# Patient Record
Sex: MALE | Race: WHITE | Employment: OTHER | URBAN - NONMETROPOLITAN AREA
[De-identification: names, ages, dates, MRNs, and addresses within clinical notes are randomized per-mention and may not be internally consistent; named-entity substitution may affect disease eponyms.]

---

## 2017-03-31 ENCOUNTER — OFFICE VISIT (OUTPATIENT)
Dept: FAMILY MEDICINE CLINIC | Age: 62
End: 2017-03-31
Payer: COMMERCIAL

## 2017-03-31 VITALS
WEIGHT: 184 LBS | DIASTOLIC BLOOD PRESSURE: 70 MMHG | HEART RATE: 64 BPM | HEIGHT: 71 IN | RESPIRATION RATE: 16 BRPM | BODY MASS INDEX: 25.76 KG/M2 | SYSTOLIC BLOOD PRESSURE: 130 MMHG

## 2017-03-31 DIAGNOSIS — M89.8X5 PAIN IN RIGHT FEMUR: Primary | ICD-10-CM

## 2017-03-31 DIAGNOSIS — S72.044A CLOSED NONDISPLACED BASICERVICAL FRACTURE OF RIGHT FEMUR, INITIAL ENCOUNTER (HCC): ICD-10-CM

## 2017-03-31 DIAGNOSIS — E11.59 TYPE 2 DIABETES MELLITUS WITH OTHER CIRCULATORY COMPLICATION: ICD-10-CM

## 2017-03-31 LAB — HBA1C MFR BLD: 5.6 %

## 2017-03-31 PROCEDURE — 83036 HEMOGLOBIN GLYCOSYLATED A1C: CPT | Performed by: FAMILY MEDICINE

## 2017-03-31 PROCEDURE — 99214 OFFICE O/P EST MOD 30 MIN: CPT | Performed by: FAMILY MEDICINE

## 2017-03-31 RX ORDER — LINAGLIPTIN 5 MG/1
TABLET, FILM COATED ORAL
Refills: 0 | COMMUNITY
Start: 2017-03-03 | End: 2017-04-07 | Stop reason: SDUPTHER

## 2017-03-31 RX ORDER — ASPIRIN 81 MG
TABLET,CHEWABLE ORAL
Refills: 0 | COMMUNITY
Start: 2017-03-03 | End: 2017-03-31 | Stop reason: ALTCHOICE

## 2017-03-31 RX ORDER — GABAPENTIN 400 MG/1
400 CAPSULE ORAL 4 TIMES DAILY
COMMUNITY
End: 2017-08-08 | Stop reason: SDUPTHER

## 2017-03-31 RX ORDER — PIOGLITAZONEHYDROCHLORIDE 15 MG/1
TABLET ORAL
Refills: 0 | COMMUNITY
Start: 2017-03-03 | End: 2017-03-31 | Stop reason: SDUPTHER

## 2017-03-31 RX ORDER — OXYCODONE HYDROCHLORIDE AND ACETAMINOPHEN 5; 325 MG/1; MG/1
TABLET ORAL
Refills: 0 | COMMUNITY
Start: 2017-03-03 | End: 2018-03-05 | Stop reason: ALTCHOICE

## 2017-03-31 RX ORDER — FERROUS SULFATE 325(65) MG
TABLET ORAL
Refills: 0 | COMMUNITY
Start: 2017-03-03 | End: 2017-03-31 | Stop reason: SDUPTHER

## 2017-03-31 ASSESSMENT — ENCOUNTER SYMPTOMS
COUGH: 0
CHOKING: 0
PHOTOPHOBIA: 0
FACIAL SWELLING: 0
CONSTIPATION: 0
EYE DISCHARGE: 0
ALLERGIC/IMMUNOLOGIC NEGATIVE: 1
EYES NEGATIVE: 1
WHEEZING: 0
VOICE CHANGE: 0
ABDOMINAL DISTENTION: 0
SINUS PRESSURE: 0
RECTAL PAIN: 0
EYE PAIN: 0
COLOR CHANGE: 0
DIARRHEA: 0
GASTROINTESTINAL NEGATIVE: 1
SHORTNESS OF BREATH: 0
ANAL BLEEDING: 0
BACK PAIN: 0
VOMITING: 0
TROUBLE SWALLOWING: 0
NAUSEA: 0
RHINORRHEA: 0
STRIDOR: 0
RESPIRATORY NEGATIVE: 1
CHEST TIGHTNESS: 0
BLOOD IN STOOL: 0
EYE REDNESS: 0
APNEA: 0
ABDOMINAL PAIN: 0
SORE THROAT: 0
EYE ITCHING: 0

## 2017-04-05 RX ORDER — FERROUS SULFATE 325(65) MG
TABLET ORAL
Qty: 60 TABLET | Refills: 0 | Status: SHIPPED | OUTPATIENT
Start: 2017-04-05 | End: 2017-04-25 | Stop reason: SDUPTHER

## 2017-04-07 DIAGNOSIS — E78.2 MIXED HYPERLIPIDEMIA: ICD-10-CM

## 2017-04-07 DIAGNOSIS — E11.59 TYPE 2 DIABETES MELLITUS WITH OTHER CIRCULATORY COMPLICATIONS (HCC): ICD-10-CM

## 2017-04-07 RX ORDER — PSEUDOEPHEDRINE HCL 30 MG
100 TABLET ORAL 2 TIMES DAILY
Qty: 60 CAPSULE | Refills: 1 | Status: SHIPPED | OUTPATIENT
Start: 2017-04-07 | End: 2017-11-10 | Stop reason: SDUPTHER

## 2017-04-07 RX ORDER — AMLODIPINE BESYLATE 10 MG/1
TABLET ORAL
Qty: 30 TABLET | Refills: 5 | Status: SHIPPED | OUTPATIENT
Start: 2017-04-07 | End: 2017-11-10 | Stop reason: SDUPTHER

## 2017-04-07 RX ORDER — GLIPIZIDE 10 MG/1
TABLET, FILM COATED, EXTENDED RELEASE ORAL
Qty: 30 TABLET | Refills: 5 | Status: SHIPPED | OUTPATIENT
Start: 2017-04-07 | End: 2017-11-10 | Stop reason: SDUPTHER

## 2017-04-07 RX ORDER — LINAGLIPTIN 5 MG/1
TABLET, FILM COATED ORAL
Qty: 30 TABLET | Refills: 5 | Status: SHIPPED | OUTPATIENT
Start: 2017-04-07 | End: 2017-11-10 | Stop reason: SDUPTHER

## 2017-04-07 RX ORDER — LOVASTATIN 20 MG/1
TABLET ORAL
Qty: 30 TABLET | Refills: 5 | Status: SHIPPED | OUTPATIENT
Start: 2017-04-07 | End: 2017-11-10 | Stop reason: SDUPTHER

## 2017-04-26 RX ORDER — FERROUS SULFATE 325(65) MG
TABLET ORAL
Qty: 60 TABLET | Refills: 0 | Status: SHIPPED | OUTPATIENT
Start: 2017-04-26 | End: 2017-05-12 | Stop reason: SDUPTHER

## 2017-05-09 DIAGNOSIS — Z89.421 STATUS POST AMPUTATION OF TOE OF RIGHT FOOT (HCC): ICD-10-CM

## 2017-05-09 RX ORDER — CLOPIDOGREL BISULFATE 75 MG/1
TABLET ORAL
Qty: 30 TABLET | Refills: 0 | Status: SHIPPED | OUTPATIENT
Start: 2017-05-09 | End: 2017-06-16 | Stop reason: SDUPTHER

## 2017-05-15 RX ORDER — FERROUS SULFATE 325(65) MG
TABLET ORAL
Qty: 60 TABLET | Refills: 0 | Status: SHIPPED | OUTPATIENT
Start: 2017-05-15 | End: 2017-06-30 | Stop reason: SDUPTHER

## 2017-05-25 DIAGNOSIS — F32.5 DEPRESSION, MAJOR, IN REMISSION (HCC): ICD-10-CM

## 2017-05-25 RX ORDER — SERTRALINE HYDROCHLORIDE 25 MG/1
TABLET, FILM COATED ORAL
Qty: 90 TABLET | Refills: 1 | Status: SHIPPED | OUTPATIENT
Start: 2017-05-25 | End: 2017-11-10 | Stop reason: SDUPTHER

## 2017-06-16 DIAGNOSIS — E11.59 TYPE 2 DIABETES MELLITUS WITH OTHER CIRCULATORY COMPLICATIONS (HCC): ICD-10-CM

## 2017-06-16 DIAGNOSIS — Z89.421 STATUS POST AMPUTATION OF TOE OF RIGHT FOOT (HCC): ICD-10-CM

## 2017-06-16 RX ORDER — CLOPIDOGREL BISULFATE 75 MG/1
TABLET ORAL
Qty: 30 TABLET | Refills: 5 | Status: SHIPPED | OUTPATIENT
Start: 2017-06-16 | End: 2017-11-10 | Stop reason: SDUPTHER

## 2017-06-30 ENCOUNTER — OFFICE VISIT (OUTPATIENT)
Dept: FAMILY MEDICINE CLINIC | Age: 62
End: 2017-06-30
Payer: COMMERCIAL

## 2017-06-30 VITALS
DIASTOLIC BLOOD PRESSURE: 70 MMHG | BODY MASS INDEX: 28.56 KG/M2 | HEART RATE: 72 BPM | HEIGHT: 71 IN | RESPIRATION RATE: 18 BRPM | WEIGHT: 204 LBS | SYSTOLIC BLOOD PRESSURE: 130 MMHG

## 2017-06-30 DIAGNOSIS — E11.59 TYPE 2 DIABETES MELLITUS WITH OTHER CIRCULATORY COMPLICATION: Primary | ICD-10-CM

## 2017-06-30 DIAGNOSIS — L50.8 URTICARIA, CHRONIC: ICD-10-CM

## 2017-06-30 DIAGNOSIS — M19.042 ARTHRITIS OF BOTH HANDS: ICD-10-CM

## 2017-06-30 DIAGNOSIS — I10 ESSENTIAL HYPERTENSION: ICD-10-CM

## 2017-06-30 DIAGNOSIS — M19.041 ARTHRITIS OF BOTH HANDS: ICD-10-CM

## 2017-06-30 DIAGNOSIS — E78.2 MIXED HYPERLIPIDEMIA: ICD-10-CM

## 2017-06-30 PROCEDURE — 99213 OFFICE O/P EST LOW 20 MIN: CPT | Performed by: FAMILY MEDICINE

## 2017-06-30 RX ORDER — CETIRIZINE HYDROCHLORIDE 10 MG/1
10 TABLET ORAL DAILY
Qty: 30 TABLET | Refills: 5 | Status: SHIPPED | OUTPATIENT
Start: 2017-06-30 | End: 2017-11-10 | Stop reason: SDUPTHER

## 2017-06-30 RX ORDER — MELOXICAM 15 MG/1
15 TABLET ORAL DAILY
Qty: 30 TABLET | Refills: 3 | Status: SHIPPED | OUTPATIENT
Start: 2017-06-30 | End: 2017-11-10 | Stop reason: SDUPTHER

## 2017-06-30 ASSESSMENT — ENCOUNTER SYMPTOMS
ALLERGIC/IMMUNOLOGIC NEGATIVE: 1
ANAL BLEEDING: 0
RHINORRHEA: 0
EYE ITCHING: 0
RESPIRATORY NEGATIVE: 1
EYE DISCHARGE: 0
PHOTOPHOBIA: 0
BACK PAIN: 0
RECTAL PAIN: 0
ABDOMINAL PAIN: 0
CHOKING: 0
EYES NEGATIVE: 1
SINUS PRESSURE: 0
STRIDOR: 0
SORE THROAT: 0
EYE REDNESS: 0
NAUSEA: 0
CHEST TIGHTNESS: 0
EYE PAIN: 0
TROUBLE SWALLOWING: 0
ABDOMINAL DISTENTION: 0
BLOOD IN STOOL: 0
SHORTNESS OF BREATH: 0
APNEA: 0
WHEEZING: 0
VOMITING: 0
FACIAL SWELLING: 0
CONSTIPATION: 0
COLOR CHANGE: 0
DIARRHEA: 0
VOICE CHANGE: 0
GASTROINTESTINAL NEGATIVE: 1
COUGH: 0

## 2017-07-19 DIAGNOSIS — S72.044A CLOSED NONDISPLACED BASICERVICAL FRACTURE OF RIGHT FEMUR, INITIAL ENCOUNTER (HCC): ICD-10-CM

## 2017-07-19 DIAGNOSIS — M89.8X5 PAIN IN RIGHT FEMUR: ICD-10-CM

## 2017-08-07 ENCOUNTER — TELEPHONE (OUTPATIENT)
Dept: FAMILY MEDICINE CLINIC | Age: 62
End: 2017-08-07

## 2017-08-07 DIAGNOSIS — I10 ESSENTIAL HYPERTENSION: Primary | ICD-10-CM

## 2017-08-07 DIAGNOSIS — E11.42 DIABETIC PERIPHERAL NEUROPATHY (HCC): ICD-10-CM

## 2017-08-07 DIAGNOSIS — D50.9 IRON DEFICIENCY ANEMIA, UNSPECIFIED IRON DEFICIENCY ANEMIA TYPE: ICD-10-CM

## 2017-08-08 PROBLEM — E11.42 DIABETIC PERIPHERAL NEUROPATHY (HCC): Status: ACTIVE | Noted: 2017-08-08

## 2017-08-08 RX ORDER — CLONIDINE HYDROCHLORIDE 0.1 MG/1
0.1 TABLET ORAL DAILY
Qty: 90 TABLET | Refills: 3 | Status: SHIPPED | OUTPATIENT
Start: 2017-08-08 | End: 2018-07-20 | Stop reason: SDUPTHER

## 2017-08-08 RX ORDER — GABAPENTIN 400 MG/1
400 CAPSULE ORAL 4 TIMES DAILY
Qty: 120 CAPSULE | Refills: 3 | Status: SHIPPED | OUTPATIENT
Start: 2017-08-08 | End: 2017-11-10 | Stop reason: SDUPTHER

## 2017-08-08 RX ORDER — LANOLIN ALCOHOL/MO/W.PET/CERES
325 CREAM (GRAM) TOPICAL 2 TIMES DAILY
Qty: 180 TABLET | Refills: 3 | Status: SHIPPED | OUTPATIENT
Start: 2017-08-08 | End: 2017-11-10

## 2017-08-30 DIAGNOSIS — M89.8X5 PAIN IN RIGHT FEMUR: ICD-10-CM

## 2017-08-30 DIAGNOSIS — S72.044A CLOSED NONDISPLACED BASICERVICAL FRACTURE OF RIGHT FEMUR, INITIAL ENCOUNTER (HCC): ICD-10-CM

## 2017-11-10 ENCOUNTER — OFFICE VISIT (OUTPATIENT)
Dept: FAMILY MEDICINE CLINIC | Age: 62
End: 2017-11-10
Payer: COMMERCIAL

## 2017-11-10 VITALS
DIASTOLIC BLOOD PRESSURE: 80 MMHG | HEIGHT: 71 IN | SYSTOLIC BLOOD PRESSURE: 140 MMHG | RESPIRATION RATE: 18 BRPM | HEART RATE: 72 BPM | WEIGHT: 208 LBS | BODY MASS INDEX: 29.12 KG/M2

## 2017-11-10 DIAGNOSIS — E78.2 MIXED HYPERLIPIDEMIA: ICD-10-CM

## 2017-11-10 DIAGNOSIS — M19.041 ARTHRITIS OF BOTH HANDS: ICD-10-CM

## 2017-11-10 DIAGNOSIS — E11.59 TYPE 2 DIABETES MELLITUS WITH OTHER CIRCULATORY COMPLICATION, UNSPECIFIED LONG TERM INSULIN USE STATUS: Primary | ICD-10-CM

## 2017-11-10 DIAGNOSIS — M19.042 ARTHRITIS OF BOTH HANDS: ICD-10-CM

## 2017-11-10 DIAGNOSIS — I10 ESSENTIAL HYPERTENSION: ICD-10-CM

## 2017-11-10 LAB — HBA1C MFR BLD: 8.7 %

## 2017-11-10 PROCEDURE — 3017F COLORECTAL CA SCREEN DOC REV: CPT | Performed by: INTERNAL MEDICINE

## 2017-11-10 PROCEDURE — 99214 OFFICE O/P EST MOD 30 MIN: CPT | Performed by: INTERNAL MEDICINE

## 2017-11-10 PROCEDURE — G8417 CALC BMI ABV UP PARAM F/U: HCPCS | Performed by: INTERNAL MEDICINE

## 2017-11-10 PROCEDURE — 3044F HG A1C LEVEL LT 7.0%: CPT | Performed by: INTERNAL MEDICINE

## 2017-11-10 PROCEDURE — G8484 FLU IMMUNIZE NO ADMIN: HCPCS | Performed by: INTERNAL MEDICINE

## 2017-11-10 PROCEDURE — 83036 HEMOGLOBIN GLYCOSYLATED A1C: CPT | Performed by: INTERNAL MEDICINE

## 2017-11-10 PROCEDURE — 1036F TOBACCO NON-USER: CPT | Performed by: INTERNAL MEDICINE

## 2017-11-10 PROCEDURE — G8427 DOCREV CUR MEDS BY ELIG CLIN: HCPCS | Performed by: INTERNAL MEDICINE

## 2017-11-10 RX ORDER — FERROUS SULFATE 325(65) MG
1 TABLET ORAL 2 TIMES DAILY
Refills: 3 | COMMUNITY
Start: 2017-11-01 | End: 2018-07-20 | Stop reason: SDUPTHER

## 2017-11-10 RX ORDER — AMLODIPINE BESYLATE 10 MG/1
TABLET ORAL
Qty: 30 TABLET | Refills: 5 | Status: SHIPPED | OUTPATIENT
Start: 2017-11-10 | End: 2018-07-20 | Stop reason: SDUPTHER

## 2017-11-10 RX ORDER — LISINOPRIL 2.5 MG/1
2.5 TABLET ORAL DAILY
Qty: 30 TABLET | Refills: 3 | Status: SHIPPED | OUTPATIENT
Start: 2017-11-10 | End: 2018-03-05 | Stop reason: SDUPTHER

## 2017-11-10 RX ORDER — GLIPIZIDE 10 MG/1
TABLET, FILM COATED, EXTENDED RELEASE ORAL
Qty: 30 TABLET | Refills: 5 | Status: SHIPPED | OUTPATIENT
Start: 2017-11-10 | End: 2018-07-20 | Stop reason: SDUPTHER

## 2017-11-10 RX ORDER — CETIRIZINE HYDROCHLORIDE 10 MG/1
10 TABLET ORAL DAILY
Qty: 30 TABLET | Refills: 5 | Status: SHIPPED | OUTPATIENT
Start: 2017-11-10 | End: 2018-07-20 | Stop reason: SDUPTHER

## 2017-11-10 RX ORDER — LINAGLIPTIN 5 MG/1
TABLET, FILM COATED ORAL
Qty: 30 TABLET | Refills: 5 | Status: SHIPPED | OUTPATIENT
Start: 2017-11-10 | End: 2017-11-13

## 2017-11-10 RX ORDER — CLOPIDOGREL BISULFATE 75 MG/1
TABLET ORAL
Qty: 30 TABLET | Refills: 5 | Status: SHIPPED | OUTPATIENT
Start: 2017-11-10 | End: 2018-07-20 | Stop reason: SDUPTHER

## 2017-11-10 RX ORDER — MELOXICAM 15 MG/1
15 TABLET ORAL DAILY
Qty: 30 TABLET | Refills: 5 | Status: SHIPPED | OUTPATIENT
Start: 2017-11-10 | End: 2018-07-20 | Stop reason: SDUPTHER

## 2017-11-10 RX ORDER — PSEUDOEPHEDRINE HCL 30 MG
100 TABLET ORAL 2 TIMES DAILY
Qty: 60 CAPSULE | Refills: 1 | Status: SHIPPED | OUTPATIENT
Start: 2017-11-10 | End: 2018-03-05 | Stop reason: SDUPTHER

## 2017-11-10 RX ORDER — LOVASTATIN 20 MG/1
TABLET ORAL
Qty: 30 TABLET | Refills: 5 | Status: SHIPPED | OUTPATIENT
Start: 2017-11-10 | End: 2018-07-20 | Stop reason: SDUPTHER

## 2017-11-10 RX ORDER — SERTRALINE HYDROCHLORIDE 25 MG/1
TABLET, FILM COATED ORAL
Qty: 90 TABLET | Refills: 1 | Status: SHIPPED | OUTPATIENT
Start: 2017-11-10 | End: 2018-07-20 | Stop reason: SDUPTHER

## 2017-11-10 ASSESSMENT — ENCOUNTER SYMPTOMS
COUGH: 0
NAUSEA: 0
BLURRED VISION: 0
HEARTBURN: 0
SORE THROAT: 0
ABDOMINAL PAIN: 0
SHORTNESS OF BREATH: 0

## 2017-11-10 NOTE — PATIENT INSTRUCTIONS
Assessment & Plan       1. Type 2 diabetes mellitus with other circulatory complication, unspecified long term insulin use status (HCC)   HbA1C is 8.7 % today  Mari Barnard says he ran out of [employer] a couple of months ago. Compliance stressed out to the patient today POCT glycosylated hemoglobin (Hb A1C)   2. Arthritis of both hands  meloxicam (MOBIC) 15 MG tablet   3. Mixed hyperlipidemia   Lipid profile check lovastatin (MEVACOR) 20 MG tablet   4.  Essential hypertension   Will start on Lisinopril, check BMP

## 2017-11-10 NOTE — PROGRESS NOTES
([facility] [address] 9/16/2011    Sinusitis, chronic 9/16/2011    Type II or unspecified type diabetes mellitus without mention of complication, not stated as uncontrolled       Reviewed all health maintenance requirements and ordered appropriate tests  Health Maintenance Due   Topic Date Due    Diabetic retinal exam  08/19/2016    Diabetic foot exam  03/04/2017    Diabetic microalbuminuria test  03/04/2017    Lipid screen  03/04/2017    Flu vaccine (1) 09/01/2017       Past Surgical History:     Past Surgical History:   Procedure Laterality Date    HERNIA REPAIR  11-98    INTERTROCHANTERIC HIP FRACTURE SURGERY Right     TOE AMPUTATION Right     All toes on right foot, 4th toe on left foot    VASCULAR SURGERY  10/2015    Right Carotid         Medications:       Prior to Admission medications    Medication Sig Start Date End Date Taking?  Authorizing Provider   ferrous sulfate 325 (65 Fe) MG tablet Take 1 tablet by mouth 2 times daily 11/1/17  Yes Historical Provider, MD   cloNIDine (CATAPRES) 0.1 MG tablet Take 1 tablet by mouth daily 8/8/17  Yes Ortiz Samuels MD   diclofenac (VOLTAREN) 50 MG EC tablet TAKE 1 TABLET BY MOUTH 3 TIMES DAILY (WITH MEALS) 7/20/17  Yes Ivonne Tavera DO   cetirizine (ZYRTEC ALLERGY) 10 MG tablet Take 1 tablet by mouth daily 6/30/17  Yes Ortiz Samuels MD   meloxicam (MOBIC) 15 MG tablet Take 1 tablet by mouth daily 6/30/17  Yes Ortiz Samuels MD   metFORMIN (GLUCOPHAGE) 500 MG tablet Take 1 tablet by mouth 3 times daily 6/16/17  Yes Ortiz Samuels MD   clopidogrel (PLAVIX) 75 MG tablet TAKE 1 TABLET BY MOUTH DAILY FOR 30DAYS 6/16/17  Yes Ortiz Samuels MD   sertraline (ZOLOFT) 25 MG tablet TAKE 1 TABLET BY MOUTH DAILY 5/25/17  Yes Ortiz Samuels MD   TRADJENTA 5 MG tablet TAKE 1 TABLET BY MOUTH DAILY 4/7/17  Yes Ortiz Samuels MD   metoprolol tartrate (LOPRESSOR) 25 MG tablet TAKE 1/2TAB BY MOUTH TWICE A DAY FOR 30DAYS 4/7/17  Yes Ortiz Samuels MD   docusate (COLACE, DULCOLAX) 100 MG CAPS Take 100 mg by mouth 2 times daily 4/7/17  Yes Korey Pitts MD   lovastatin (MEVACOR) 20 MG tablet TAKE 1 TABLET BY MOUTH DAILY 4/7/17  Yes Korey Pitts MD   canagliflozin (INVOKANA) 300 MG TABS tablet Take 1 tablet by mouth every morning (before breakfast) 4/7/17  Yes Korey Pitts MD   amLODIPine (NORVASC) 10 MG tablet TAKE 1 TABLET BY MOUTH EVERY DAY 4/7/17  Yes Korey Pitts MD   glipiZIDE (GLUCOTROL XL) 10 MG extended release tablet TAKE 1 TABLET BY MOUTH DAILY 4/7/17  Yes Korey Pitts MD   oxyCODONE-acetaminophen (PERCOCET) 5-325 MG per tablet TAKE 1 TABLET BY MOUTH EVERY 6 HOURS AS NEEDED FOR PAIN 3/3/17  Yes Historical Provider, MD   gabapentin (NEURONTIN) 400 MG capsule Take 1 capsule by mouth 4 times daily 3/4/16 8/8/18 Yes Korey Pitts MD   fenofibrate 160 MG tablet Take 160 mg by mouth daily. Prescribed by [address] [place] [address]   Yes Historical Provider, MD   aspirin 81 MG tablet Take 81 mg by mouth daily. Yes Historical Provider, MD        Allergies:       Review of patient's allergies indicates no known allergies. Social History:     Tobacco:    reports that he has quit smoking. His smoking use included Cigarettes. He has a 15.00 pack-year smoking history. He has never used smokeless tobacco.  Alcohol:      reports that he does not drink alcohol. Drug Use:  has no drug history on file. Family History:     Family History   Problem Relation Age of Onset    Diabetes Father        Review of Systems:         Review of Systems   Constitutional: Negative for chills and fever. HENT: Negative for congestion and sore throat. Eyes: Negative for blurred vision. Respiratory: Negative for cough and shortness of breath. Cardiovascular: Negative for chest pain and palpitations. Gastrointestinal: Negative for abdominal pain, heartburn and nausea. Genitourinary: Negative for dysuria. Skin: Negative for rash.    Neurological: Negative encounter. Orders Placed This Encounter   Procedures    POCT glycosylated hemoglobin (Hb A1C)         There are no Patient Instructions on file for this visit. Electronically signed by Randy Eid MD on 11/10/2017 at 2:04 PM           Completed Refills   Requested Prescriptions     Pending Prescriptions Disp Refills    diclofenac (VOLTAREN) 50 MG EC tablet 90 tablet 5     Sig: TAKE 1 TABLET BY MOUTH 3 TIMES DAILY (WITH MEALS)    cetirizine (ZYRTEC ALLERGY) 10 MG tablet 30 tablet 5     Sig: Take 1 tablet by mouth daily    meloxicam (MOBIC) 15 MG tablet 30 tablet 5     Sig: Take 1 tablet by mouth daily    clopidogrel (PLAVIX) 75 MG tablet 30 tablet 5     Sig: TAKE 1 TABLET BY MOUTH DAILY FOR 30DAYS    sertraline (ZOLOFT) 25 MG tablet 90 tablet 1     Sig: TAKE 1 TABLET BY MOUTH DAILY    TRADJENTA 5 MG tablet 30 tablet 5     Sig: TAKE 1 TABLET BY MOUTH DAILY    metoprolol tartrate (LOPRESSOR) 25 MG tablet 60 tablet 5     Sig: TAKE 1/2TAB BY MOUTH TWICE A DAY FOR 30DAYS    docusate (COLACE, DULCOLAX) 100 MG CAPS 60 capsule 1     Sig: Take 100 mg by mouth 2 times daily    lovastatin (MEVACOR) 20 MG tablet 30 tablet 5     Sig: TAKE 1 TABLET BY MOUTH DAILY    canagliflozin (INVOKANA) 300 MG TABS tablet 30 tablet 5     Sig: Take 1 tablet by mouth every morning (before breakfast)    amLODIPine (NORVASC) 10 MG tablet 30 tablet 5     Sig: TAKE 1 TABLET BY MOUTH EVERY DAY    glipiZIDE (GLUCOTROL XL) 10 MG extended release tablet 30 tablet 5     Sig: TAKE 1 TABLET BY MOUTH DAILY    lisinopril (PRINIVIL;ZESTRIL) 2.5 MG tablet 30 tablet 3     Sig: Take 1 tablet by mouth daily           Rosas received counseling on the following healthy behaviors: nutrition  Reviewed prior labs and health maintenance  Continue current medications, diet and exercise. Discussed use, benefit, and side effects of prescribed medications. Barriers to medication compliance addressed.    Patient given educational materials - see

## 2017-11-13 RX ORDER — ALOGLIPTIN 12.5 MG/1
12.5 TABLET, FILM COATED ORAL DAILY
Qty: 30 TABLET | Refills: 3 | Status: SHIPPED | OUTPATIENT
Start: 2017-11-13 | End: 2018-03-05 | Stop reason: SDUPTHER

## 2017-12-04 ENCOUNTER — OFFICE VISIT (OUTPATIENT)
Dept: FAMILY MEDICINE CLINIC | Age: 62
End: 2017-12-04
Payer: COMMERCIAL

## 2017-12-04 VITALS
HEART RATE: 68 BPM | RESPIRATION RATE: 20 BRPM | WEIGHT: 204 LBS | BODY MASS INDEX: 28.56 KG/M2 | HEIGHT: 71 IN | DIASTOLIC BLOOD PRESSURE: 76 MMHG | SYSTOLIC BLOOD PRESSURE: 130 MMHG

## 2017-12-04 DIAGNOSIS — S81.801A MULTIPLE OPEN WOUNDS OF LOWER LEG, RIGHT, INITIAL ENCOUNTER: Primary | ICD-10-CM

## 2017-12-04 PROCEDURE — 99213 OFFICE O/P EST LOW 20 MIN: CPT | Performed by: INTERNAL MEDICINE

## 2017-12-04 PROCEDURE — G8427 DOCREV CUR MEDS BY ELIG CLIN: HCPCS | Performed by: INTERNAL MEDICINE

## 2017-12-04 PROCEDURE — G8417 CALC BMI ABV UP PARAM F/U: HCPCS | Performed by: INTERNAL MEDICINE

## 2017-12-04 PROCEDURE — 1036F TOBACCO NON-USER: CPT | Performed by: INTERNAL MEDICINE

## 2017-12-04 PROCEDURE — G8484 FLU IMMUNIZE NO ADMIN: HCPCS | Performed by: INTERNAL MEDICINE

## 2017-12-04 PROCEDURE — 3017F COLORECTAL CA SCREEN DOC REV: CPT | Performed by: INTERNAL MEDICINE

## 2017-12-04 RX ORDER — DOXYCYCLINE HYCLATE 100 MG/1
100 CAPSULE ORAL 2 TIMES DAILY
Qty: 14 CAPSULE | Refills: 0 | Status: SHIPPED | OUTPATIENT
Start: 2017-12-04 | End: 2017-12-11

## 2017-12-04 ASSESSMENT — ENCOUNTER SYMPTOMS
ABDOMINAL PAIN: 0
HEARTBURN: 0
SHORTNESS OF BREATH: 0
COUGH: 0
SORE THROAT: 0
BLURRED VISION: 0
NAUSEA: 0

## 2017-12-04 NOTE — PROGRESS NOTES
HPI Notes    Name: Cornelius Zavala  : 1955         Chief Complaint:     Chief Complaint   Patient presents with    Lesion(s)     right lower leg-started a few weeks ago       History of Present Illness:        Jazmine Posada is here for evaluation of couple of lesions on his right lower extremity   Initially he developed 2 superficial wounds from scratching his leg. Then they became red and he applied OTC neosporin cream and covered them with band aids. It made the sores get worse. Yesterday he developed more redness and pain in those lesions    Reports having low grade fever yesterday, no chills  No nausea or vomiting                Past Medical History:     Past Medical History:   Diagnosis Date    Deafness 2011    Diabetes mellitus ([facility] [address] 2011    Dyslipidemia 2011    Hypertension 2011    PAD (peripheral artery disease) ([facility] [address] 2011    Sinusitis, chronic 2011    Type II or unspecified type diabetes mellitus without mention of complication, not stated as uncontrolled       Reviewed all health maintenance requirements and ordered appropriate tests  Health Maintenance Due   Topic Date Due    Diabetic retinal exam  2016    Diabetic foot exam  2017    Diabetic microalbuminuria test  2017    Lipid screen  2017    Flu vaccine (1) 2017       Past Surgical History:     Past Surgical History:   Procedure Laterality Date    HERNIA REPAIR      INTERTROCHANTERIC HIP FRACTURE SURGERY Right     TOE AMPUTATION Right     All toes on right foot, 4th toe on left foot    VASCULAR SURGERY  10/2015    Right Carotid         Medications:       Prior to Admission medications    Medication Sig Start Date End Date Taking? Authorizing Provider   doxycycline hyclate (VIBRAMYCIN) 100 MG capsule Take 1 capsule by mouth 2 times daily for 7 days 17 Yes Blanca Dewey MD   mupirocin (BACTROBAN) 2 % ointment Apply topically 3 times daily.  17 gabapentin (NEURONTIN) 400 MG capsule Take 1 capsule by mouth 4 times daily 3/4/16 8/8/18 Yes Kelsey Zepeda MD   fenofibrate 160 MG tablet Take 160 mg by mouth daily. Prescribed by [id number] [facility]   Yes Historical Provider, MD   aspirin 81 MG tablet Take 81 mg by mouth daily. Yes Historical Provider, MD        Allergies:       Review of patient's allergies indicates no known allergies. Social History:     Tobacco:    reports that he has quit smoking. His smoking use included Cigarettes. He has a 15.00 pack-year smoking history. He has never used smokeless tobacco.  Alcohol:      reports that he does not drink alcohol. Drug Use:  has no drug history on file. Family History:     Family History   Problem Relation Age of Onset    Diabetes Father        Review of Systems:         Review of Systems   Constitutional: Negative for chills and fever. HENT: Negative for congestion and sore throat. Eyes: Negative for blurred vision. Respiratory: Negative for cough and shortness of breath. Cardiovascular: Negative for chest pain and palpitations. Gastrointestinal: Negative for abdominal pain, heartburn and nausea. Genitourinary: Negative for dysuria. Skin: Negative for rash. Neurological: Negative for headaches. Psychiatric/Behavioral: Negative for depression. The patient is not nervous/anxious. Physical Exam:     Vitals:  /76 (Site: Left Arm, Position: Sitting, Cuff Size: Medium Adult)   Pulse 68   Resp 20   Ht 5' 11\" (1.803 m)   Wt 204 lb (92.5 kg)   BMI 28.45 kg/m²       Physical Exam   Constitutional: He is oriented to person, place, and time. He appears well-developed and well-nourished. No distress. HENT:   Head: Normocephalic and atraumatic. Neck: No thyromegaly present. Cardiovascular: Normal rate, regular rhythm and normal heart sounds. No murmur heard. Pulmonary/Chest: Effort normal and breath sounds normal. He has no wheezes. He has no rales. Abdominal: Soft. Bowel sounds are normal. He exhibits no distension and no mass. There is no tenderness. Musculoskeletal: Normal range of motion. Lymphadenopathy:     He has no cervical adenopathy. Neurological: He is alert and oriented to person, place, and time. Skin: Skin is warm and dry. No rash noted. 2 small open wounds with yellowish base present on the right leg below the knee with surrounding redness, no active discharge noted  Right foot with partial amputation   Psychiatric: He has a normal mood and affect. His behavior is normal. Judgment normal.   Vitals reviewed. Data:     Lab Results   Component Value Date     12/27/2016    K 4.7 12/27/2016    CL 96 12/27/2016    CO2 27 12/27/2016    BUN 20 12/27/2016    CREATININE 0.85 12/27/2016    GLUCOSE 202 12/27/2016    AST 17 12/07/2012    ALT 13 12/07/2012     Lab Results   Component Value Date    WBC 6.3 12/27/2016    RBC 2.94 12/27/2016    HGB 8.4 12/27/2016    HCT 26.1 12/27/2016    MCV 88.8 12/27/2016    MCH 28.8 12/27/2016    MCHC 32.4 12/27/2016    RDW 14.9 12/27/2016     12/27/2016    MPV NOT REPORTED 12/27/2016     No results found for: TSH  Lab Results   Component Value Date    CHOL 172 03/04/2016    HDL 41 03/04/2016    LABA1C 8.7 11/10/2017          Assessment & Plan       1. Multiple open wounds of lower leg, right, initial encounter   Judah Resendiz is instructed to apply bactroban ointment to open wounds.  Start taking Doxycycline today  Call if fever above 101,  Vomiting or worsening redness/lesions   He is advised not to scratch the wounds and not apply band aids doxycycline hyclate (VIBRAMYCIN) 100 MG capsule    mupirocin (BACTROBAN) 2 % ointment                   Completed Refills   Requested Prescriptions     Signed Prescriptions Disp Refills    doxycycline hyclate (VIBRAMYCIN) 100 MG capsule 14 capsule 0     Sig: Take 1 capsule by mouth 2 times daily for 7 days    mupirocin (BACTROBAN) 2 % ointment 3 g 0     Sig: Apply topically 3 times daily. Return in about 10 days (around 12/14/2017), or if symptoms worsen or fail to improve, for right foot infected wounds. Orders Placed This Encounter   Medications    doxycycline hyclate (VIBRAMYCIN) 100 MG capsule     Sig: Take 1 capsule by mouth 2 times daily for 7 days     Dispense:  14 capsule     Refill:  0    mupirocin (BACTROBAN) 2 % ointment     Sig: Apply topically 3 times daily. Dispense:  3 g     Refill:  0     No orders of the defined types were placed in this encounter. There are no Patient Instructions on file for this visit. Electronically signed by Lang Pham MD on 12/4/2017 at 2:37 PM           Completed Refills   Requested Prescriptions     Signed Prescriptions Disp Refills    doxycycline hyclate (VIBRAMYCIN) 100 MG capsule 14 capsule 0     Sig: Take 1 capsule by mouth 2 times daily for 7 days    mupirocin (BACTROBAN) 2 % ointment 3 g 0     Sig: Apply topically 3 times daily.

## 2017-12-21 ENCOUNTER — OFFICE VISIT (OUTPATIENT)
Dept: FAMILY MEDICINE CLINIC | Age: 62
End: 2017-12-21
Payer: COMMERCIAL

## 2017-12-21 VITALS
DIASTOLIC BLOOD PRESSURE: 72 MMHG | HEART RATE: 72 BPM | BODY MASS INDEX: 28.56 KG/M2 | RESPIRATION RATE: 18 BRPM | HEIGHT: 71 IN | SYSTOLIC BLOOD PRESSURE: 120 MMHG | WEIGHT: 204 LBS

## 2017-12-21 DIAGNOSIS — I73.9 PAD (PERIPHERAL ARTERY DISEASE) (HCC): ICD-10-CM

## 2017-12-21 DIAGNOSIS — S81.801D MULTIPLE OPEN WOUNDS OF LOWER LEG, RIGHT, SUBSEQUENT ENCOUNTER: Primary | ICD-10-CM

## 2017-12-21 PROBLEM — M89.8X5 PAIN IN RIGHT FEMUR: Status: RESOLVED | Noted: 2017-03-31 | Resolved: 2017-12-21

## 2017-12-21 PROCEDURE — 3017F COLORECTAL CA SCREEN DOC REV: CPT | Performed by: INTERNAL MEDICINE

## 2017-12-21 PROCEDURE — G8417 CALC BMI ABV UP PARAM F/U: HCPCS | Performed by: INTERNAL MEDICINE

## 2017-12-21 PROCEDURE — G8427 DOCREV CUR MEDS BY ELIG CLIN: HCPCS | Performed by: INTERNAL MEDICINE

## 2017-12-21 PROCEDURE — G8484 FLU IMMUNIZE NO ADMIN: HCPCS | Performed by: INTERNAL MEDICINE

## 2017-12-21 PROCEDURE — 99213 OFFICE O/P EST LOW 20 MIN: CPT | Performed by: INTERNAL MEDICINE

## 2017-12-21 PROCEDURE — 1036F TOBACCO NON-USER: CPT | Performed by: INTERNAL MEDICINE

## 2017-12-21 RX ORDER — SULFAMETHOXAZOLE AND TRIMETHOPRIM 800; 160 MG/1; MG/1
1 TABLET ORAL 2 TIMES DAILY
Qty: 20 TABLET | Refills: 0 | Status: SHIPPED | OUTPATIENT
Start: 2017-12-21 | End: 2017-12-31

## 2017-12-21 ASSESSMENT — ENCOUNTER SYMPTOMS
NAUSEA: 0
SHORTNESS OF BREATH: 0
ABDOMINAL PAIN: 0
HEARTBURN: 0
SORE THROAT: 0
BLURRED VISION: 0
COUGH: 0

## 2017-12-21 NOTE — LETTER
[address]  [address] [place] [id number]  Phone: [phone]  Fax: [phone]    oHward Velez MD        December 21, 2017     Patient: Jody Franco   YOB: 1955   Date of Visit: 12/21/2017       To Whom it May Concern:    Luli Shipley was seen in my clinic on 12/21/2017. Please excuse him from work November 30, 2017 through December 22, 2017. If you have any questions or concerns, please don't hesitate to call.     Sincerely,         Howard Velez MD

## 2017-12-21 NOTE — PROGRESS NOTES
HPI Notes    Name: Alva Fernandez  : 1955         Chief Complaint:     Chief Complaint   Patient presents with    Wound Check     recheck ounds on right lower legs-ran out of ointment       History of Present Illness:          Unique Duncan is here complaining of persisting open wounds on the right leg  He finished doxycycline course, but wounds are not any better. He has surrounding redness , no significant drainage. Had no fever, chills  Unique Duncan has a hx PAD and follows up with vascular surgeon Dr. Moisés Tate in [place]   He is supposed to see him again on 2018            Past Medical History:     Past Medical History:   Diagnosis Date    Deafness 2011    Diabetes mellitus ([facility] [address] 2011    Dyslipidemia 2011    Hypertension 2011    PAD (peripheral artery disease) ([facility] [address] 2011    Sinusitis, chronic 2011    Type II or unspecified type diabetes mellitus without mention of complication, not stated as uncontrolled       Reviewed all health maintenance requirements and ordered appropriate tests  Health Maintenance Due   Topic Date Due    Diabetic retinal exam  2016    Diabetic foot exam  2017    Diabetic microalbuminuria test  2017    Lipid screen  2017    Flu vaccine (1) 2017       Past Surgical History:     Past Surgical History:   Procedure Laterality Date    HERNIA REPAIR      INTERTROCHANTERIC HIP FRACTURE SURGERY Right     TOE AMPUTATION Right     All toes on right foot, 4th toe on left foot    VASCULAR SURGERY  10/2015    Right Carotid         Medications:       Prior to Admission medications    Medication Sig Start Date End Date Taking? Authorizing Provider   sulfamethoxazole-trimethoprim (BACTRIM DS;SEPTRA DS) 800-160 MG per tablet Take 1 tablet by mouth 2 times daily for 10 days 17 Yes Eulalio Dietz MD   mupirocin (BACTROBAN) 2 % ointment Apply topically 3 times daily.  17 Yes Eulalio Dietz MD alogliptin (NESINA) 12.5 MG TABS tablet Take 1 tablet by mouth daily 11/13/17  Yes Brayden Palm MD   ferrous sulfate 325 (65 Fe) MG tablet Take 1 tablet by mouth 2 times daily 11/1/17  Yes Francisco Tan MD   diclofenac (VOLTAREN) 50 MG EC tablet TAKE 1 TABLET BY MOUTH 3 TIMES DAILY (WITH MEALS) 11/10/17  Yes Brayden Palm MD   cetirizine (ZYRTEC ALLERGY) 10 MG tablet Take 1 tablet by mouth daily 11/10/17  Yes Brayden Palm MD   meloxicam (MOBIC) 15 MG tablet Take 1 tablet by mouth daily 11/10/17  Yes Brayden Palm MD   clopidogrel (PLAVIX) 75 MG tablet TAKE 1 TABLET BY MOUTH DAILY FOR 30DAYS 11/10/17  Yes Brayden Palm MD   sertraline (ZOLOFT) 25 MG tablet TAKE 1 TABLET BY MOUTH DAILY 11/10/17  Yes Brayden Palm MD   metoprolol tartrate (LOPRESSOR) 25 MG tablet TAKE 1/2TAB BY MOUTH TWICE A DAY FOR 30DAYS 11/10/17  Yes Brayden Palm MD   docusate (COLACE, DULCOLAX) 100 MG CAPS Take 100 mg by mouth 2 times daily 11/10/17  Yes Brayden Palm MD   lovastatin (MEVACOR) 20 MG tablet TAKE 1 TABLET BY MOUTH DAILY 11/10/17  Yes Brayden Palm MD   canagliflozin (INVOKANA) 300 MG TABS tablet Take 1 tablet by mouth every morning (before breakfast) 11/10/17  Yes Brayden Palm MD   amLODIPine (NORVASC) 10 MG tablet TAKE 1 TABLET BY MOUTH EVERY DAY 11/10/17  Yes Brayden Palm MD   glipiZIDE (GLUCOTROL XL) 10 MG extended release tablet TAKE 1 TABLET BY MOUTH DAILY 11/10/17  Yes Brayden Palm MD   lisinopril (PRINIVIL;ZESTRIL) 2.5 MG tablet Take 1 tablet by mouth daily 11/10/17  Yes Brayden Palm MD   cloNIDine (CATAPRES) 0.1 MG tablet Take 1 tablet by mouth daily 8/8/17  Yes Paola Knapp MD   metFORMIN (GLUCOPHAGE) 500 MG tablet Take 1 tablet by mouth 3 times daily 6/16/17  Yes Paola Knapp MD   oxyCODONE-acetaminophen (PERCOCET) 5-325 MG per tablet TAKE 1 TABLET BY MOUTH EVERY 6 HOURS AS NEEDED FOR PAIN 3/3/17  Yes Historical Provider, MD   gabapentin (NEURONTIN) 400 MG normal. He exhibits no distension and no mass. There is no tenderness. Musculoskeletal: Normal range of motion. He exhibits no edema or tenderness. 2 open wounds with yellowish base present on the right leg below the knee with surrounding redness, no active discharge noted. Wounds appear to be larger than on 12/04/17  Right foot with partial amputation    Lymphadenopathy:     He has no cervical adenopathy. Neurological: He is alert and oriented to person, place, and time. Skin: Skin is warm and dry. No rash noted. Psychiatric: He has a normal mood and affect. His behavior is normal. Judgment normal.   Vitals reviewed. Data:     Lab Results   Component Value Date     12/27/2016    K 4.7 12/27/2016    CL 96 12/27/2016    CO2 27 12/27/2016    BUN 20 12/27/2016    CREATININE 0.85 12/27/2016    GLUCOSE 202 12/27/2016    AST 17 12/07/2012    ALT 13 12/07/2012     Lab Results   Component Value Date    WBC 6.3 12/27/2016    RBC 2.94 12/27/2016    HGB 8.4 12/27/2016    HCT 26.1 12/27/2016    MCV 88.8 12/27/2016    MCH 28.8 12/27/2016    MCHC 32.4 12/27/2016    RDW 14.9 12/27/2016     12/27/2016    MPV NOT REPORTED 12/27/2016     No results found for: TSH  Lab Results   Component Value Date    CHOL 172 03/04/2016    HDL 41 03/04/2016    LABA1C 8.7 11/10/2017          Assessment & Plan       1. Multiple open wounds of lower leg, right, subsequent encounter     We contacted his  vascular surgeon Dr Mali Contreras office in [place] and made an appointment for Nelson Covarrubias on Jan 3rd, 2017 for evaluation of nonhealing open wounds. He has PAD. Will start on Bactrim and continue topical Bactroban sulfamethoxazole-trimethoprim (BACTRIM DS;SEPTRA DS) 800-160 MG per tablet    mupirocin (BACTROBAN) 2 % ointment   2.  PAD (peripheral artery disease) (HCC)   Compliant with Plavix, ASA and statins                    Completed Refills   Requested Prescriptions     Signed Prescriptions Disp Refills   

## 2018-02-01 RX ORDER — GABAPENTIN 400 MG/1
400 CAPSULE ORAL 4 TIMES DAILY
Qty: 120 CAPSULE | Refills: 3 | Status: SHIPPED | OUTPATIENT
Start: 2018-02-01 | End: 2018-03-05 | Stop reason: SDUPTHER

## 2018-03-05 ENCOUNTER — OFFICE VISIT (OUTPATIENT)
Dept: FAMILY MEDICINE CLINIC | Age: 63
End: 2018-03-05
Payer: MEDICARE

## 2018-03-05 ENCOUNTER — HOSPITAL ENCOUNTER (OUTPATIENT)
Age: 63
Discharge: HOME | End: 2018-03-05
Payer: MEDICARE

## 2018-03-05 VITALS
HEIGHT: 71 IN | SYSTOLIC BLOOD PRESSURE: 120 MMHG | DIASTOLIC BLOOD PRESSURE: 70 MMHG | WEIGHT: 201 LBS | RESPIRATION RATE: 20 BRPM | HEART RATE: 72 BPM | BODY MASS INDEX: 28.14 KG/M2

## 2018-03-05 DIAGNOSIS — D64.9 CHRONIC ANEMIA: ICD-10-CM

## 2018-03-05 DIAGNOSIS — E78.2 MIXED HYPERLIPIDEMIA: ICD-10-CM

## 2018-03-05 DIAGNOSIS — I10 ESSENTIAL HYPERTENSION: ICD-10-CM

## 2018-03-05 DIAGNOSIS — E11.59 TYPE 2 DIABETES MELLITUS WITH OTHER CIRCULATORY COMPLICATION, UNSPECIFIED LONG TERM INSULIN USE STATUS: Primary | ICD-10-CM

## 2018-03-05 DIAGNOSIS — E11.42 DIABETIC PERIPHERAL NEUROPATHY (HCC): ICD-10-CM

## 2018-03-05 DIAGNOSIS — I73.9 PAD (PERIPHERAL ARTERY DISEASE) (HCC): ICD-10-CM

## 2018-03-05 LAB
ABSOLUTE EOS #: 0.2 K/UL (ref 0–0.4)
ABSOLUTE IMMATURE GRANULOCYTE: ABNORMAL K/UL (ref 0–0.3)
ABSOLUTE LYMPH #: 2 K/UL (ref 1–4.8)
ABSOLUTE MONO #: 0.7 K/UL (ref 0–1)
ANION GAP SERPL CALCULATED.3IONS-SCNC: 13 MMOL/L (ref 9–17)
BASOPHILS # BLD: 0 % (ref 0–2)
BASOPHILS ABSOLUTE: 0 K/UL (ref 0–0.2)
BUN BLDV-MCNC: 27 MG/DL (ref 8–23)
BUN/CREAT BLD: 34 (ref 9–20)
CALCIUM SERPL-MCNC: 9.4 MG/DL (ref 8.6–10.4)
CHLORIDE BLD-SCNC: 100 MMOL/L (ref 98–107)
CHOLESTEROL/HDL RATIO: 3.6
CHOLESTEROL: 116 MG/DL
CO2: 24 MMOL/L (ref 20–31)
CREAT SERPL-MCNC: 0.8 MG/DL (ref 0.7–1.2)
DIFFERENTIAL TYPE: YES
EOSINOPHILS RELATIVE PERCENT: 2 % (ref 0–5)
GFR AFRICAN AMERICAN: >60 ML/MIN
GFR NON-AFRICAN AMERICAN: >60 ML/MIN
GFR SERPL CREATININE-BSD FRML MDRD: ABNORMAL ML/MIN/{1.73_M2}
GFR SERPL CREATININE-BSD FRML MDRD: ABNORMAL ML/MIN/{1.73_M2}
GLUCOSE BLD-MCNC: 162 MG/DL (ref 70–99)
HBA1C MFR BLD: 6.6 %
HCT VFR BLD CALC: 37 % (ref 41–53)
HDLC SERPL-MCNC: 32 MG/DL
HEMOGLOBIN: 12.2 G/DL (ref 13.5–17.5)
IMMATURE GRANULOCYTES: ABNORMAL %
LDL CHOLESTEROL: 57 MG/DL (ref 0–130)
LYMPHOCYTES # BLD: 19 % (ref 13–44)
MCH RBC QN AUTO: 29.5 PG (ref 26–34)
MCHC RBC AUTO-ENTMCNC: 33 G/DL (ref 31–37)
MCV RBC AUTO: 89.3 FL (ref 80–100)
MONOCYTES # BLD: 7 % (ref 5–9)
NRBC AUTOMATED: ABNORMAL PER 100 WBC
PATIENT FASTING?: YES
PDW BLD-RTO: 14.5 % (ref 12.1–15.2)
PLATELET # BLD: 236 K/UL (ref 140–450)
PLATELET ESTIMATE: ABNORMAL
PMV BLD AUTO: ABNORMAL FL (ref 6–12)
POTASSIUM SERPL-SCNC: 5.1 MMOL/L (ref 3.7–5.3)
RBC # BLD: 4.15 M/UL (ref 4.5–5.9)
RBC # BLD: ABNORMAL 10*6/UL
SEG NEUTROPHILS: 72 % (ref 39–75)
SEGMENTED NEUTROPHILS ABSOLUTE COUNT: 7.6 K/UL (ref 2.1–6.5)
SODIUM BLD-SCNC: 137 MMOL/L (ref 135–144)
TRIGL SERPL-MCNC: 133 MG/DL
VLDLC SERPL CALC-MCNC: ABNORMAL MG/DL (ref 1–30)
WBC # BLD: 10.6 K/UL (ref 3.5–11)
WBC # BLD: ABNORMAL 10*3/UL

## 2018-03-05 PROCEDURE — 99214 OFFICE O/P EST MOD 30 MIN: CPT | Performed by: INTERNAL MEDICINE

## 2018-03-05 PROCEDURE — 36415 COLL VENOUS BLD VENIPUNCTURE: CPT

## 2018-03-05 PROCEDURE — 80048 BASIC METABOLIC PNL TOTAL CA: CPT

## 2018-03-05 PROCEDURE — 83036 HEMOGLOBIN GLYCOSYLATED A1C: CPT | Performed by: INTERNAL MEDICINE

## 2018-03-05 PROCEDURE — 85025 COMPLETE CBC W/AUTO DIFF WBC: CPT

## 2018-03-05 PROCEDURE — 80061 LIPID PANEL: CPT

## 2018-03-05 RX ORDER — LISINOPRIL 2.5 MG/1
2.5 TABLET ORAL DAILY
Qty: 30 TABLET | Refills: 5 | Status: SHIPPED | OUTPATIENT
Start: 2018-03-05 | End: 2018-07-20 | Stop reason: SDUPTHER

## 2018-03-05 RX ORDER — ALOGLIPTIN 12.5 MG/1
12.5 TABLET, FILM COATED ORAL DAILY
Qty: 30 TABLET | Refills: 3 | Status: SHIPPED | OUTPATIENT
Start: 2018-03-05 | End: 2018-07-20 | Stop reason: SDUPTHER

## 2018-03-05 RX ORDER — GABAPENTIN 400 MG/1
400 CAPSULE ORAL 4 TIMES DAILY
Qty: 120 CAPSULE | Refills: 3 | Status: SHIPPED | OUTPATIENT
Start: 2018-03-05 | End: 2018-09-21 | Stop reason: ALTCHOICE

## 2018-03-05 RX ORDER — PSEUDOEPHEDRINE HCL 30 MG
100 TABLET ORAL 2 TIMES DAILY
Qty: 60 CAPSULE | Refills: 5 | Status: SHIPPED | OUTPATIENT
Start: 2018-03-05 | End: 2018-07-20 | Stop reason: SDUPTHER

## 2018-03-05 ASSESSMENT — ENCOUNTER SYMPTOMS
COUGH: 0
NAUSEA: 0
HEARTBURN: 0
SORE THROAT: 0
ABDOMINAL PAIN: 0
BLURRED VISION: 0
SHORTNESS OF BREATH: 0

## 2018-03-05 NOTE — PROGRESS NOTES
Risk factors for coronary artery disease include diabetes mellitus, dyslipidemia, male gender, smoking/tobacco exposure and obesity. Past treatments include calcium channel blockers, alpha 1 blockers, ACE inhibitors and beta blockers. The current treatment provides significant improvement. Hypertensive end-organ damage includes PVD. There is no history of CVA, heart failure or retinopathy. Hyperlipidemia   This is a chronic problem. The current episode started more than 1 year ago. The problem is controlled. Recent lipid tests were reviewed and are variable. Exacerbating diseases include diabetes. Pertinent negatives include no chest pain or shortness of breath. Current antihyperlipidemic treatment includes statins and fibric acid derivatives. The current treatment provides significant improvement of lipids. There are no compliance problems.             Past Medical History:     Past Medical History:   Diagnosis Date    Deafness 9/16/2011    Diabetes mellitus ([facility] [address] 9/16/2011    Dyslipidemia 9/16/2011    Hypertension 9/16/2011    [facility]AD [facility]per[facility] artery disease) ([facility] 9/16/2011    Sinusitis, chronic 9/16/2011    Type II or unspecified type diabetes mellitus without mention of complication, not stated as uncontrolled       Reviewed all health maintenance requirements and ordered appropriate tests  Health Maintenance Due   Topic Date Due    AAA screen  1955    Shingles Vaccine (1 of 2 - 2 Dose Series) 02/22/2003    Diabetic foot exam  03/04/2017    Diabetic microalbuminuria test  03/04/2017    Lipid screen  03/04/2017    Flu vaccine (1) 09/01/2017    Potassium monitoring  12/27/2017    Creatinine monitoring  12/27/2017    Pneumococcal low/med risk (1 of 2 - PCV13) 02/22/2018       Past Surgical History:     Past Surgical History:   Procedure Laterality Date    HERNIA REPAIR  11-98    INTERTROCHANTERIC HIP FRACTURE SURGERY Right     TOE AMPUTATION Right     All toes on right foot, 4th toe on Historical Provider, MD   aspirin 81 MG tablet Take 81 mg by mouth daily. Yes Historical Provider, MD   cetirizine (ZYRTEC ALLERGY) 10 MG tablet Take 1 tablet by mouth daily 11/10/17   Ladarius Benavidez MD        Allergies:       Patient has no known allergies. Social History:     Tobacco:    reports that he has quit smoking. His smoking use included Cigarettes. He has a 15.00 pack-year smoking history. He has never used smokeless tobacco.  Alcohol:      reports that he does not drink alcohol. Drug Use:  has no drug history on file. Family History:     Family History   Problem Relation Age of Onset    Diabetes Father        Review of Systems:         Review of Systems   Constitutional: Negative for chills, fever and weight loss. HENT: Negative for congestion and sore throat. Eyes: Negative for blurred vision. Respiratory: Negative for cough and shortness of breath. Cardiovascular: Negative for chest pain and palpitations. Gastrointestinal: Negative for abdominal pain, heartburn and nausea. Genitourinary: Negative for dysuria. Skin: Negative for rash. Neurological: Negative for headaches. Psychiatric/Behavioral: Negative for depression. The patient is not nervous/anxious. Physical Exam:     Vitals:  /70 (Site: Left Arm, Position: Sitting, Cuff Size: Large Adult)   Pulse 72   Resp 20   Ht 5' 11\" (1.803 m)   Wt 201 lb (91.2 kg)   BMI 28.03 kg/m²       Physical Exam   Constitutional: He is oriented to person, place, and time. He appears well-developed and well-nourished. No distress. HENT:   Head: Normocephalic and atraumatic. Right Ear: External ear normal.   Left Ear: External ear normal.   Mouth/Throat: Oropharynx is clear and moist. No oropharyngeal exudate. Neck: No thyromegaly present. Cardiovascular: Normal rate, regular rhythm and normal heart sounds. No murmur heard. Pulmonary/Chest: Effort normal and breath sounds normal. He has no wheezes.  He has no

## 2018-03-21 ENCOUNTER — TELEPHONE (OUTPATIENT)
Dept: FAMILY MEDICINE CLINIC | Age: 63
End: 2018-03-21

## 2018-03-22 ENCOUNTER — OFFICE VISIT (OUTPATIENT)
Dept: FAMILY MEDICINE CLINIC | Age: 63
End: 2018-03-22
Payer: COMMERCIAL

## 2018-03-22 ENCOUNTER — HOSPITAL ENCOUNTER (INPATIENT)
Age: 63
LOS: 1 days | Discharge: ANOTHER HOSPITAL | DRG: 300 | End: 2018-03-23
Attending: EMERGENCY MEDICINE | Admitting: INTERNAL MEDICINE
Payer: MEDICARE

## 2018-03-22 ENCOUNTER — APPOINTMENT (OUTPATIENT)
Dept: GENERAL RADIOLOGY | Age: 63
DRG: 300 | End: 2018-03-22
Payer: MEDICARE

## 2018-03-22 VITALS
HEART RATE: 68 BPM | BODY MASS INDEX: 28.14 KG/M2 | WEIGHT: 201 LBS | RESPIRATION RATE: 18 BRPM | HEIGHT: 71 IN | SYSTOLIC BLOOD PRESSURE: 110 MMHG | DIASTOLIC BLOOD PRESSURE: 70 MMHG

## 2018-03-22 DIAGNOSIS — E11.621 CHRONIC DIABETIC ULCER OF RIGHT FOOT DETERMINED BY EXAMINATION (HCC): Primary | ICD-10-CM

## 2018-03-22 DIAGNOSIS — L97.519 CHRONIC DIABETIC ULCER OF RIGHT FOOT DETERMINED BY EXAMINATION (HCC): Primary | ICD-10-CM

## 2018-03-22 DIAGNOSIS — I96 GANGRENE OF RIGHT FOOT (HCC): Primary | ICD-10-CM

## 2018-03-22 PROBLEM — L97.509 DIABETES MELLITUS WITH FOOT ULCER AND GANGRENE (HCC): Status: ACTIVE | Noted: 2018-03-22

## 2018-03-22 PROBLEM — E11.52 DIABETES MELLITUS WITH FOOT ULCER AND GANGRENE (HCC): Status: ACTIVE | Noted: 2018-03-22

## 2018-03-22 PROCEDURE — G8427 DOCREV CUR MEDS BY ELIG CLIN: HCPCS | Performed by: INTERNAL MEDICINE

## 2018-03-22 PROCEDURE — 87205 SMEAR GRAM STAIN: CPT

## 2018-03-22 PROCEDURE — 87070 CULTURE OTHR SPECIMN AEROBIC: CPT

## 2018-03-22 PROCEDURE — 1123F ACP DISCUSS/DSCN MKR DOCD: CPT | Performed by: INTERNAL MEDICINE

## 2018-03-22 PROCEDURE — 6370000000 HC RX 637 (ALT 250 FOR IP): Performed by: INTERNAL MEDICINE

## 2018-03-22 PROCEDURE — G8417 CALC BMI ABV UP PARAM F/U: HCPCS | Performed by: INTERNAL MEDICINE

## 2018-03-22 PROCEDURE — 6360000002 HC RX W HCPCS: Performed by: EMERGENCY MEDICINE

## 2018-03-22 PROCEDURE — 80053 COMPREHEN METABOLIC PANEL: CPT

## 2018-03-22 PROCEDURE — 2580000003 HC RX 258: Performed by: EMERGENCY MEDICINE

## 2018-03-22 PROCEDURE — 87040 BLOOD CULTURE FOR BACTERIA: CPT

## 2018-03-22 PROCEDURE — 87186 SC STD MICRODIL/AGAR DIL: CPT

## 2018-03-22 PROCEDURE — 1200000000 HC SEMI PRIVATE

## 2018-03-22 PROCEDURE — 94760 N-INVAS EAR/PLS OXIMETRY 1: CPT

## 2018-03-22 PROCEDURE — 1036F TOBACCO NON-USER: CPT | Performed by: INTERNAL MEDICINE

## 2018-03-22 PROCEDURE — 87147 CULTURE TYPE IMMUNOLOGIC: CPT

## 2018-03-22 PROCEDURE — 86403 PARTICLE AGGLUT ANTBDY SCRN: CPT

## 2018-03-22 PROCEDURE — 85025 COMPLETE CBC W/AUTO DIFF WBC: CPT

## 2018-03-22 PROCEDURE — 82947 ASSAY GLUCOSE BLOOD QUANT: CPT

## 2018-03-22 PROCEDURE — 3017F COLORECTAL CA SCREEN DOC REV: CPT | Performed by: INTERNAL MEDICINE

## 2018-03-22 PROCEDURE — 2580000003 HC RX 258: Performed by: INTERNAL MEDICINE

## 2018-03-22 PROCEDURE — 4040F PNEUMOC VAC/ADMIN/RCVD: CPT | Performed by: INTERNAL MEDICINE

## 2018-03-22 PROCEDURE — 73630 X-RAY EXAM OF FOOT: CPT

## 2018-03-22 PROCEDURE — 99284 EMERGENCY DEPT VISIT MOD MDM: CPT

## 2018-03-22 PROCEDURE — 6360000002 HC RX W HCPCS: Performed by: INTERNAL MEDICINE

## 2018-03-22 PROCEDURE — 99213 OFFICE O/P EST LOW 20 MIN: CPT | Performed by: INTERNAL MEDICINE

## 2018-03-22 PROCEDURE — G8484 FLU IMMUNIZE NO ADMIN: HCPCS | Performed by: INTERNAL MEDICINE

## 2018-03-22 PROCEDURE — 85651 RBC SED RATE NONAUTOMATED: CPT

## 2018-03-22 RX ORDER — DEXTROSE MONOHYDRATE 50 MG/ML
100 INJECTION, SOLUTION INTRAVENOUS PRN
Status: DISCONTINUED | OUTPATIENT
Start: 2018-03-22 | End: 2018-03-23 | Stop reason: HOSPADM

## 2018-03-22 RX ORDER — SODIUM CHLORIDE 0.9 % (FLUSH) 0.9 %
10 SYRINGE (ML) INJECTION PRN
Status: DISCONTINUED | OUTPATIENT
Start: 2018-03-22 | End: 2018-03-23 | Stop reason: HOSPADM

## 2018-03-22 RX ORDER — CLOPIDOGREL BISULFATE 75 MG/1
75 TABLET ORAL DAILY
Status: DISCONTINUED | OUTPATIENT
Start: 2018-03-22 | End: 2018-03-23 | Stop reason: HOSPADM

## 2018-03-22 RX ORDER — SODIUM CHLORIDE 0.9 % (FLUSH) 0.9 %
10 SYRINGE (ML) INJECTION EVERY 12 HOURS SCHEDULED
Status: DISCONTINUED | OUTPATIENT
Start: 2018-03-22 | End: 2018-03-23 | Stop reason: HOSPADM

## 2018-03-22 RX ORDER — GLIPIZIDE 5 MG/1
10 TABLET ORAL
Status: DISCONTINUED | OUTPATIENT
Start: 2018-03-23 | End: 2018-03-23 | Stop reason: HOSPADM

## 2018-03-22 RX ORDER — ACETAMINOPHEN 325 MG/1
650 TABLET ORAL EVERY 4 HOURS PRN
Status: DISCONTINUED | OUTPATIENT
Start: 2018-03-22 | End: 2018-03-23 | Stop reason: HOSPADM

## 2018-03-22 RX ORDER — AMLODIPINE BESYLATE 10 MG/1
10 TABLET ORAL DAILY
Status: DISCONTINUED | OUTPATIENT
Start: 2018-03-22 | End: 2018-03-23 | Stop reason: HOSPADM

## 2018-03-22 RX ORDER — FERROUS SULFATE 325(65) MG
325 TABLET ORAL 2 TIMES DAILY
Status: DISCONTINUED | OUTPATIENT
Start: 2018-03-22 | End: 2018-03-23 | Stop reason: HOSPADM

## 2018-03-22 RX ORDER — ASPIRIN 81 MG/1
81 TABLET, CHEWABLE ORAL DAILY
Status: DISCONTINUED | OUTPATIENT
Start: 2018-03-22 | End: 2018-03-23 | Stop reason: HOSPADM

## 2018-03-22 RX ORDER — SIMVASTATIN 20 MG
10 TABLET ORAL NIGHTLY
Status: DISCONTINUED | OUTPATIENT
Start: 2018-03-22 | End: 2018-03-23 | Stop reason: HOSPADM

## 2018-03-22 RX ORDER — NICOTINE POLACRILEX 4 MG
15 LOZENGE BUCCAL PRN
Status: DISCONTINUED | OUTPATIENT
Start: 2018-03-22 | End: 2018-03-23 | Stop reason: HOSPADM

## 2018-03-22 RX ORDER — FENOFIBRATE 160 MG/1
160 TABLET ORAL DAILY
Status: DISCONTINUED | OUTPATIENT
Start: 2018-03-22 | End: 2018-03-23 | Stop reason: HOSPADM

## 2018-03-22 RX ORDER — CLONIDINE HYDROCHLORIDE 0.1 MG/1
0.1 TABLET ORAL DAILY
Status: DISCONTINUED | OUTPATIENT
Start: 2018-03-22 | End: 2018-03-23 | Stop reason: HOSPADM

## 2018-03-22 RX ORDER — CETIRIZINE HYDROCHLORIDE 10 MG/1
10 TABLET ORAL DAILY
Status: DISCONTINUED | OUTPATIENT
Start: 2018-03-22 | End: 2018-03-23 | Stop reason: HOSPADM

## 2018-03-22 RX ORDER — ONDANSETRON 2 MG/ML
4 INJECTION INTRAMUSCULAR; INTRAVENOUS EVERY 6 HOURS PRN
Status: DISCONTINUED | OUTPATIENT
Start: 2018-03-22 | End: 2018-03-23 | Stop reason: HOSPADM

## 2018-03-22 RX ORDER — LISINOPRIL 5 MG/1
2.5 TABLET ORAL DAILY
Status: DISCONTINUED | OUTPATIENT
Start: 2018-03-22 | End: 2018-03-23 | Stop reason: HOSPADM

## 2018-03-22 RX ORDER — DEXTROSE MONOHYDRATE 25 G/50ML
12.5 INJECTION, SOLUTION INTRAVENOUS PRN
Status: DISCONTINUED | OUTPATIENT
Start: 2018-03-22 | End: 2018-03-23 | Stop reason: HOSPADM

## 2018-03-22 RX ORDER — MELOXICAM 15 MG/1
15 TABLET ORAL DAILY
Status: DISCONTINUED | OUTPATIENT
Start: 2018-03-22 | End: 2018-03-23 | Stop reason: HOSPADM

## 2018-03-22 RX ORDER — DOCUSATE SODIUM 100 MG/1
100 CAPSULE, LIQUID FILLED ORAL 2 TIMES DAILY
Status: DISCONTINUED | OUTPATIENT
Start: 2018-03-22 | End: 2018-03-23 | Stop reason: HOSPADM

## 2018-03-22 RX ADMIN — SIMVASTATIN 10 MG: 20 TABLET, FILM COATED ORAL at 21:30

## 2018-03-22 RX ADMIN — CLOPIDOGREL BISULFATE 75 MG: 75 TABLET ORAL at 21:29

## 2018-03-22 RX ADMIN — ASPIRIN 81 MG 81 MG: 81 TABLET ORAL at 21:29

## 2018-03-22 RX ADMIN — LINAGLIPTIN 5 MG: 5 TABLET, FILM COATED ORAL at 21:40

## 2018-03-22 RX ADMIN — FENOFIBRATE 160 MG: 160 TABLET ORAL at 21:40

## 2018-03-22 RX ADMIN — SERTRALINE HYDROCHLORIDE 25 MG: 50 TABLET ORAL at 21:41

## 2018-03-22 RX ADMIN — DOCUSATE SODIUM 100 MG: 100 CAPSULE, LIQUID FILLED ORAL at 21:40

## 2018-03-22 RX ADMIN — CLONIDINE HYDROCHLORIDE 0.1 MG: 0.1 TABLET ORAL at 21:29

## 2018-03-22 RX ADMIN — CETIRIZINE HYDROCHLORIDE 10 MG: 10 TABLET, FILM COATED ORAL at 21:29

## 2018-03-22 RX ADMIN — PIPERACILLIN SODIUM,TAZOBACTAM SODIUM 3.38 G: 3; .375 INJECTION, POWDER, FOR SOLUTION INTRAVENOUS at 19:30

## 2018-03-22 RX ADMIN — ENOXAPARIN SODIUM 40 MG: 40 INJECTION SUBCUTANEOUS at 21:39

## 2018-03-22 RX ADMIN — ACETAMINOPHEN 650 MG: 325 TABLET, FILM COATED ORAL at 21:37

## 2018-03-22 RX ADMIN — LISINOPRIL 2.5 MG: 5 TABLET ORAL at 21:40

## 2018-03-22 RX ADMIN — AMLODIPINE BESYLATE 10 MG: 10 TABLET ORAL at 21:29

## 2018-03-22 RX ADMIN — PIPERACILLIN SODIUM,TAZOBACTAM SODIUM 3.38 G: 3; .375 INJECTION, POWDER, FOR SOLUTION INTRAVENOUS at 18:50

## 2018-03-22 RX ADMIN — VANCOMYCIN HYDROCHLORIDE 1000 MG: 1 INJECTION, POWDER, LYOPHILIZED, FOR SOLUTION INTRAVENOUS at 20:36

## 2018-03-22 RX ADMIN — FERROUS SULFATE TAB 325 MG (65 MG ELEMENTAL FE) 325 MG: 325 (65 FE) TAB at 21:30

## 2018-03-22 RX ADMIN — GABAPENTIN 400 MG: 300 CAPSULE ORAL at 21:31

## 2018-03-22 RX ADMIN — METOPROLOL TARTRATE 12.5 MG: 25 TABLET ORAL at 21:30

## 2018-03-22 RX ADMIN — MELOXICAM 15 MG: 15 TABLET ORAL at 21:41

## 2018-03-22 ASSESSMENT — ENCOUNTER SYMPTOMS
COUGH: 0
ABDOMINAL PAIN: 0
SHORTNESS OF BREATH: 0
HEARTBURN: 0
BLURRED VISION: 0
SORE THROAT: 0
NAUSEA: 0

## 2018-03-22 ASSESSMENT — PAIN DESCRIPTION - PAIN TYPE
TYPE: ACUTE PAIN
TYPE: ACUTE PAIN

## 2018-03-22 ASSESSMENT — PAIN SCALES - GENERAL
PAINLEVEL_OUTOF10: 3
PAINLEVEL_OUTOF10: 0
PAINLEVEL_OUTOF10: 3

## 2018-03-22 ASSESSMENT — PATIENT HEALTH QUESTIONNAIRE - PHQ9
SUM OF ALL RESPONSES TO PHQ9 QUESTIONS 1 & 2: 1
2. FEELING DOWN, DEPRESSED OR HOPELESS: 1
SUM OF ALL RESPONSES TO PHQ QUESTIONS 1-9: 1
1. LITTLE INTEREST OR PLEASURE IN DOING THINGS: 0

## 2018-03-22 ASSESSMENT — PAIN DESCRIPTION - LOCATION: LOCATION: FOOT

## 2018-03-22 ASSESSMENT — PAIN DESCRIPTION - ORIENTATION: ORIENTATION: RIGHT

## 2018-03-22 NOTE — PROGRESS NOTES
exam  03/04/2017    Diabetic microalbuminuria test  03/04/2017    Flu vaccine (1) 09/01/2017    Pneumococcal low/med risk (1 of 2 - PCV13) 02/22/2018       Past Surgical History:     Past Surgical History:   Procedure Laterality Date    HERNIA REPAIR  11-98    INTERTROCHANTERIC HIP FRACTURE SURGERY Right     TOE AMPUTATION Right     All toes on right foot, 4th toe on left foot    VASCULAR SURGERY  10/2015    Right Carotid         Medications:       Prior to Admission medications    Medication Sig Start Date End Date Taking? Authorizing Provider   gabapentin (NEURONTIN) 400 MG capsule Take 1 capsule by mouth 4 times daily for 30 days.  3/5/18 4/4/18 Yes Corazon Galdamez MD   alogliptin (NESINA) 12.5 MG TABS tablet Take 1 tablet by mouth daily 3/5/18  Yes Corazon Galdamez MD   docusate (COLACE, DULCOLAX) 100 MG CAPS Take 100 mg by mouth 2 times daily 3/5/18  Yes Corazon Galdamez MD   lisinopril (PRINIVIL;ZESTRIL) 2.5 MG tablet Take 1 tablet by mouth daily 3/5/18  Yes Corazon Galdamez MD   metFORMIN (GLUCOPHAGE) 1000 MG tablet Take 1 tablet by mouth 2 times daily (with meals) 3/5/18  Yes Corazon Galdamez MD   ferrous sulfate 325 (65 Fe) MG tablet Take 1 tablet by mouth 2 times daily 11/1/17  Yes Historical Provider, MD   diclofenac (VOLTAREN) 50 MG EC tablet TAKE 1 TABLET BY MOUTH 3 TIMES DAILY (WITH MEALS) 11/10/17  Yes Corazon Galdamez MD   cetirizine (ZYRTEC ALLERGY) 10 MG tablet Take 1 tablet by mouth daily 11/10/17  Yes Corazon Galdamez MD   meloxicam (MOBIC) 15 MG tablet Take 1 tablet by mouth daily 11/10/17  Yes Corazon Galdamez MD   clopidogrel (PLAVIX) 75 MG tablet TAKE 1 TABLET BY MOUTH DAILY FOR 30DAYS 11/10/17  Yes Corazon Galdamez MD   sertraline (ZOLOFT) 25 MG tablet TAKE 1 TABLET BY MOUTH DAILY 11/10/17  Yes Corazon Galdamez MD   metoprolol tartrate (LOPRESSOR) 25 MG tablet TAKE 1/2TAB BY MOUTH TWICE A DAY FOR 30DAYS 11/10/17  Yes Corazon Galdamez MD   lovastatin (MEVACOR) 20 MG tablet TAKE 1 TABLET BY MOUTH DAILY 11/10/17  Yes Ladarius Benavidez MD   amLODIPine (NORVASC) 10 MG tablet TAKE 1 TABLET BY MOUTH EVERY DAY 11/10/17  Yes Ladarius Benavidez MD   glipiZIDE (GLUCOTROL XL) 10 MG extended release tablet TAKE 1 TABLET BY MOUTH DAILY 11/10/17  Yes Ladarius Benavidez MD   cloNIDine (CATAPRES) 0.1 MG tablet Take 1 tablet by mouth daily 8/8/17  Yes Bacilio Gorman MD   fenofibrate 160 MG tablet Take 160 mg by mouth daily. Prescribed by [address] [place] [address]   Yes Historical Provider, MD   aspirin 81 MG tablet Take 81 mg by mouth daily. Yes Historical Provider, MD        Allergies:       Patient has no known allergies. Social History:     Tobacco:    reports that he has quit smoking. His smoking use included Cigarettes. He has a 15.00 pack-year smoking history. He has never used smokeless tobacco.  Alcohol:      reports that he does not drink alcohol. Drug Use:  has no drug history on file. Family History:     Family History   Problem Relation Age of Onset    Diabetes Father        Review of Systems:         Review of Systems   Constitutional: Negative for chills and fever. HENT: Negative for congestion and sore throat. Eyes: Negative for blurred vision. Respiratory: Negative for cough and shortness of breath. Cardiovascular: Negative for chest pain and palpitations. Gastrointestinal: Negative for abdominal pain, heartburn and nausea. Genitourinary: Negative for dysuria. Skin: Negative for rash. Right foot ulcer, redness   Neurological: Negative for dizziness and headaches. Psychiatric/Behavioral: Negative for depression. The patient is not nervous/anxious. Physical Exam:     Vitals:  /70 (Site: Left Arm, Position: Sitting, Cuff Size: Large Adult)   Pulse 68   Resp 18   Ht 5' 11\" (1.803 m)   Wt 201 lb (91.2 kg)   BMI 28.03 kg/m²       Physical Exam   Constitutional: He is oriented to person, place, and time. He appears well-developed and well-nourished. No distress. HENT:   Head: Normocephalic and atraumatic. Neck: No thyromegaly present. Cardiovascular: Normal rate, regular rhythm and normal heart sounds. No murmur heard. Pulmonary/Chest: Effort normal and breath sounds normal. He has no wheezes. He has no rales. Abdominal: Soft. Bowel sounds are normal. He exhibits no distension and no mass. There is no tenderness. Musculoskeletal: Normal range of motion. He exhibits no edema. R lateral foot with a large ulcer, wet gangrene, associated redness of the foot, with foul odor   Lymphadenopathy:     He has no cervical adenopathy. Neurological: He is alert and oriented to person, place, and time. Skin: Skin is warm and dry. No rash noted. There is erythema (right foot). Psychiatric: He has a normal mood and affect. His behavior is normal. Judgment normal.   Vitals reviewed. Data:     Lab Results   Component Value Date     03/05/2018    K 5.1 03/05/2018     03/05/2018    CO2 24 03/05/2018    BUN 27 03/05/2018    CREATININE 0.80 03/05/2018    GLUCOSE 162 03/05/2018    AST 17 12/07/2012    ALT 13 12/07/2012     Lab Results   Component Value Date    WBC 10.6 03/05/2018    RBC 4.15 03/05/2018    HGB 12.2 03/05/2018    HCT 37.0 03/05/2018    MCV 89.3 03/05/2018    MCH 29.5 03/05/2018    MCHC 33.0 03/05/2018    RDW 14.5 03/05/2018     03/05/2018    MPV NOT REPORTED 03/05/2018     No results found for: TSH  Lab Results   Component Value Date    CHOL 116 03/05/2018    HDL 32 03/05/2018    LABA1C 6.6 03/05/2018          Assessment & Plan       1. Gangrene of right foot [facility])    Brady Martines strongly advised to go to ER as soon as possible for further management of foot wound, gangrene . I believe  He will need admission for IV Anbx, wound debridement, MRI etc.                     Completed Refills   Requested Prescriptions      No prescriptions requested or ordered in this encounter     No Follow-up on file.   No orders of the defined

## 2018-03-23 VITALS
HEART RATE: 58 BPM | HEIGHT: 71 IN | WEIGHT: 194.13 LBS | SYSTOLIC BLOOD PRESSURE: 127 MMHG | DIASTOLIC BLOOD PRESSURE: 66 MMHG | TEMPERATURE: 98 F | BODY MASS INDEX: 27.18 KG/M2 | OXYGEN SATURATION: 96 % | RESPIRATION RATE: 16 BRPM

## 2018-03-23 PROBLEM — E44.1 MILD MALNUTRITION (HCC): Status: ACTIVE | Noted: 2018-03-23

## 2018-03-23 LAB
ABSOLUTE EOS #: 0.2 K/UL (ref 0–0.4)
ABSOLUTE IMMATURE GRANULOCYTE: ABNORMAL K/UL (ref 0–0.3)
ABSOLUTE LYMPH #: 1.3 K/UL (ref 1–4.8)
ABSOLUTE MONO #: 0.8 K/UL (ref 0–1)
ANION GAP SERPL CALCULATED.3IONS-SCNC: 10 MMOL/L (ref 9–17)
BASOPHILS # BLD: 0 % (ref 0–2)
BASOPHILS ABSOLUTE: 0 K/UL (ref 0–0.2)
BUN BLDV-MCNC: 24 MG/DL (ref 8–23)
BUN/CREAT BLD: 36 (ref 9–20)
C-REACTIVE PROTEIN: 71.2 MG/L (ref 0–5)
CALCIUM SERPL-MCNC: 9.3 MG/DL (ref 8.6–10.4)
CHLORIDE BLD-SCNC: 104 MMOL/L (ref 98–107)
CO2: 24 MMOL/L (ref 20–31)
CREAT SERPL-MCNC: 0.66 MG/DL (ref 0.7–1.2)
DIFFERENTIAL TYPE: YES
EOSINOPHILS RELATIVE PERCENT: 2 % (ref 0–5)
GFR AFRICAN AMERICAN: >60 ML/MIN
GFR NON-AFRICAN AMERICAN: >60 ML/MIN
GFR SERPL CREATININE-BSD FRML MDRD: ABNORMAL ML/MIN/{1.73_M2}
GFR SERPL CREATININE-BSD FRML MDRD: ABNORMAL ML/MIN/{1.73_M2}
GLUCOSE BLD-MCNC: 105 MG/DL (ref 65–99)
GLUCOSE BLD-MCNC: 149 MG/DL (ref 70–99)
GLUCOSE BLD-MCNC: 151 MG/DL (ref 65–99)
HCT VFR BLD CALC: 32.3 % (ref 41–53)
HEMOGLOBIN: 10.7 G/DL (ref 13.5–17.5)
IMMATURE GRANULOCYTES: ABNORMAL %
IRON SATURATION: 13 % (ref 20–55)
IRON: 29 UG/DL (ref 59–158)
LYMPHOCYTES # BLD: 17 % (ref 13–44)
MCH RBC QN AUTO: 29.4 PG (ref 26–34)
MCHC RBC AUTO-ENTMCNC: 33.1 G/DL (ref 31–37)
MCV RBC AUTO: 88.9 FL (ref 80–100)
MONOCYTES # BLD: 10 % (ref 5–9)
NRBC AUTOMATED: ABNORMAL PER 100 WBC
PDW BLD-RTO: 13.9 % (ref 12.1–15.2)
PLATELET # BLD: 183 K/UL (ref 140–450)
PLATELET ESTIMATE: ABNORMAL
PMV BLD AUTO: ABNORMAL FL (ref 6–12)
POTASSIUM SERPL-SCNC: 4.3 MMOL/L (ref 3.7–5.3)
RBC # BLD: 3.63 M/UL (ref 4.5–5.9)
RBC # BLD: ABNORMAL 10*6/UL
SEG NEUTROPHILS: 71 % (ref 39–75)
SEGMENTED NEUTROPHILS ABSOLUTE COUNT: 5.5 K/UL (ref 2.1–6.5)
SODIUM BLD-SCNC: 138 MMOL/L (ref 135–144)
TOTAL IRON BINDING CAPACITY: 230 UG/DL (ref 250–450)
UNSATURATED IRON BINDING CAPACITY: 201 UG/DL (ref 112–347)
WBC # BLD: 7.7 K/UL (ref 3.5–11)
WBC # BLD: ABNORMAL 10*3/UL

## 2018-03-23 PROCEDURE — 6360000002 HC RX W HCPCS: Performed by: INTERNAL MEDICINE

## 2018-03-23 PROCEDURE — 94761 N-INVAS EAR/PLS OXIMETRY MLT: CPT

## 2018-03-23 PROCEDURE — 86403 PARTICLE AGGLUT ANTBDY SCRN: CPT

## 2018-03-23 PROCEDURE — 87205 SMEAR GRAM STAIN: CPT

## 2018-03-23 PROCEDURE — 6370000000 HC RX 637 (ALT 250 FOR IP): Performed by: INTERNAL MEDICINE

## 2018-03-23 PROCEDURE — 36415 COLL VENOUS BLD VENIPUNCTURE: CPT

## 2018-03-23 PROCEDURE — 86140 C-REACTIVE PROTEIN: CPT

## 2018-03-23 PROCEDURE — 83540 ASSAY OF IRON: CPT

## 2018-03-23 PROCEDURE — 2580000003 HC RX 258: Performed by: INTERNAL MEDICINE

## 2018-03-23 PROCEDURE — 83550 IRON BINDING TEST: CPT

## 2018-03-23 PROCEDURE — 87070 CULTURE OTHR SPECIMN AEROBIC: CPT

## 2018-03-23 PROCEDURE — 82947 ASSAY GLUCOSE BLOOD QUANT: CPT

## 2018-03-23 PROCEDURE — 87186 SC STD MICRODIL/AGAR DIL: CPT

## 2018-03-23 PROCEDURE — 80048 BASIC METABOLIC PNL TOTAL CA: CPT

## 2018-03-23 PROCEDURE — 85025 COMPLETE CBC W/AUTO DIFF WBC: CPT

## 2018-03-23 RX ADMIN — CETIRIZINE HYDROCHLORIDE 10 MG: 10 TABLET, FILM COATED ORAL at 09:17

## 2018-03-23 RX ADMIN — GLIPIZIDE 10 MG: 5 TABLET ORAL at 06:50

## 2018-03-23 RX ADMIN — GABAPENTIN 400 MG: 300 CAPSULE ORAL at 09:17

## 2018-03-23 RX ADMIN — INSULIN LISPRO 2 UNITS: 100 INJECTION, SOLUTION INTRAVENOUS; SUBCUTANEOUS at 12:21

## 2018-03-23 RX ADMIN — GABAPENTIN 400 MG: 300 CAPSULE ORAL at 17:45

## 2018-03-23 RX ADMIN — LINAGLIPTIN 5 MG: 5 TABLET, FILM COATED ORAL at 09:18

## 2018-03-23 RX ADMIN — PIPERACILLIN SODIUM,TAZOBACTAM SODIUM 3.38 G: 3; .375 INJECTION, POWDER, FOR SOLUTION INTRAVENOUS at 03:35

## 2018-03-23 RX ADMIN — CLONIDINE HYDROCHLORIDE 0.1 MG: 0.1 TABLET ORAL at 09:17

## 2018-03-23 RX ADMIN — METOPROLOL TARTRATE 12.5 MG: 25 TABLET ORAL at 09:18

## 2018-03-23 RX ADMIN — PIPERACILLIN SODIUM,TAZOBACTAM SODIUM 3.38 G: 3; .375 INJECTION, POWDER, FOR SOLUTION INTRAVENOUS at 11:44

## 2018-03-23 RX ADMIN — MELOXICAM 15 MG: 15 TABLET ORAL at 09:18

## 2018-03-23 RX ADMIN — AMLODIPINE BESYLATE 10 MG: 10 TABLET ORAL at 09:17

## 2018-03-23 RX ADMIN — INSULIN LISPRO 1 UNITS: 100 INJECTION, SOLUTION INTRAVENOUS; SUBCUTANEOUS at 08:31

## 2018-03-23 RX ADMIN — DOCUSATE SODIUM 100 MG: 100 CAPSULE, LIQUID FILLED ORAL at 09:17

## 2018-03-23 RX ADMIN — VANCOMYCIN HYDROCHLORIDE 1500 MG: 1 INJECTION, POWDER, LYOPHILIZED, FOR SOLUTION INTRAVENOUS at 09:17

## 2018-03-23 RX ADMIN — FENOFIBRATE 160 MG: 160 TABLET ORAL at 09:22

## 2018-03-23 RX ADMIN — LISINOPRIL 2.5 MG: 5 TABLET ORAL at 09:18

## 2018-03-23 RX ADMIN — SERTRALINE HYDROCHLORIDE 25 MG: 50 TABLET ORAL at 11:45

## 2018-03-23 RX ADMIN — GABAPENTIN 400 MG: 300 CAPSULE ORAL at 13:34

## 2018-03-23 RX ADMIN — FERROUS SULFATE TAB 325 MG (65 MG ELEMENTAL FE) 325 MG: 325 (65 FE) TAB at 09:17

## 2018-03-23 ASSESSMENT — PAIN SCALES - GENERAL
PAINLEVEL_OUTOF10: 3
PAINLEVEL_OUTOF10: 0
PAINLEVEL_OUTOF10: 0

## 2018-03-23 ASSESSMENT — PAIN DESCRIPTION - ORIENTATION: ORIENTATION: RIGHT

## 2018-03-23 ASSESSMENT — PAIN DESCRIPTION - LOCATION
LOCATION: FOOT
LOCATION: FOOT

## 2018-03-23 ASSESSMENT — PAIN DESCRIPTION - PAIN TYPE
TYPE: ACUTE PAIN
TYPE: ACUTE PAIN

## 2018-03-24 LAB
GLUCOSE BLD-MCNC: 152 MG/DL (ref 65–99)
GLUCOSE BLD-MCNC: 216 MG/DL (ref 65–99)

## 2018-03-26 LAB
ABSOLUTE EOS #: 0.1 K/UL (ref 0–0.4)
ABSOLUTE IMMATURE GRANULOCYTE: ABNORMAL K/UL (ref 0–0.3)
ABSOLUTE LYMPH #: 1.1 K/UL (ref 1–4.8)
ABSOLUTE MONO #: 0.9 K/UL (ref 0–1)
ALBUMIN SERPL-MCNC: 3.8 G/DL (ref 3.5–5.2)
ALBUMIN/GLOBULIN RATIO: ABNORMAL (ref 1–2.5)
ALP BLD-CCNC: 89 U/L (ref 40–129)
ALT SERPL-CCNC: 7 U/L (ref 5–41)
ANION GAP SERPL CALCULATED.3IONS-SCNC: 16 MMOL/L (ref 8–16)
AST SERPL-CCNC: 9 U/L
BASOPHILS # BLD: 0 % (ref 0–2)
BASOPHILS ABSOLUTE: 0 K/UL (ref 0–0.2)
BILIRUB SERPL-MCNC: 0.25 MG/DL (ref 0.3–1.2)
BUN BLDV-MCNC: 30 MG/DL (ref 8–23)
BUN/CREAT BLD: 35 (ref 9–20)
CALCIUM SERPL-MCNC: 9.6 MG/DL (ref 8.6–10.4)
CHLORIDE BLD-SCNC: 100 MMOL/L (ref 98–107)
CO2: 21 MMOL/L (ref 20–31)
CREAT SERPL-MCNC: 0.85 MG/DL (ref 0.7–1.2)
CULTURE: ABNORMAL
DIFFERENTIAL TYPE: YES
DIRECT EXAM: ABNORMAL
EOSINOPHILS RELATIVE PERCENT: 1 % (ref 0–5)
GFR AFRICAN AMERICAN: >60 ML/MIN
GFR NON-AFRICAN AMERICAN: >60 ML/MIN
GFR SERPL CREATININE-BSD FRML MDRD: ABNORMAL ML/MIN/{1.73_M2}
GFR SERPL CREATININE-BSD FRML MDRD: ABNORMAL ML/MIN/{1.73_M2}
GLUCOSE BLD-MCNC: 122 MG/DL (ref 70–99)
HCT VFR BLD CALC: 34.2 % (ref 41–53)
HEMOGLOBIN: 11.4 G/DL (ref 13.5–17.5)
IMMATURE GRANULOCYTES: ABNORMAL %
LYMPHOCYTES # BLD: 13 % (ref 13–44)
Lab: ABNORMAL
MCH RBC QN AUTO: 29.4 PG (ref 26–34)
MCHC RBC AUTO-ENTMCNC: 33.3 G/DL (ref 31–37)
MCV RBC AUTO: 88.4 FL (ref 80–100)
MONOCYTES # BLD: 10 % (ref 5–9)
NRBC AUTOMATED: ABNORMAL PER 100 WBC
ORGANISM: ABNORMAL
PDW BLD-RTO: 13.9 % (ref 12.1–15.2)
PLATELET # BLD: 207 K/UL (ref 140–450)
PLATELET ESTIMATE: ABNORMAL
PMV BLD AUTO: ABNORMAL FL
POTASSIUM SERPL-SCNC: 4.2 MMOL/L (ref 3.7–5.3)
RBC # BLD: 3.87 M/UL (ref 4.5–5.9)
RBC # BLD: ABNORMAL 10*6/UL
SEDIMENTATION RATE, ERYTHROCYTE: 46 MM (ref 0–20)
SEG NEUTROPHILS: 76 % (ref 39–75)
SEGMENTED NEUTROPHILS ABSOLUTE COUNT: 7.1 K/UL (ref 2.1–6.5)
SODIUM BLD-SCNC: 137 MMOL/L (ref 135–144)
SPECIMEN DESCRIPTION: ABNORMAL
STATUS: ABNORMAL
TOTAL PROTEIN: 7.1 G/DL (ref 6.4–8.3)
WBC # BLD: 9.2 K/UL (ref 3.5–11)
WBC # BLD: ABNORMAL 10*3/UL

## 2018-06-11 RX ORDER — GABAPENTIN 400 MG/1
400 CAPSULE ORAL 4 TIMES DAILY
Qty: 120 CAPSULE | Refills: 0 | Status: SHIPPED | OUTPATIENT
Start: 2018-06-11 | End: 2018-07-20 | Stop reason: SDUPTHER

## 2018-06-14 RX ORDER — HYDROCODONE BITARTRATE AND ACETAMINOPHEN 7.5; 325 MG/1; MG/1
TABLET ORAL
Refills: 0 | COMMUNITY
Start: 2018-05-23 | End: 2019-04-04 | Stop reason: ALTCHOICE

## 2018-07-02 NOTE — TELEPHONE ENCOUNTER
circulatory disorder ([address]     COPD with acute exacerbation (HCC)     Closed nondisplaced basicervical fracture of right femur ([address]     Diabetic peripheral neuropathy ([address]     Mixed hyperlipidemia     Gangrene of foot ([address]     Diabetes mellitus with foot ulcer and gangrene ([address]     Mild malnutrition ([address]

## 2018-07-20 ENCOUNTER — OFFICE VISIT (OUTPATIENT)
Dept: FAMILY MEDICINE CLINIC | Age: 63
End: 2018-07-20
Payer: MEDICARE

## 2018-07-20 ENCOUNTER — HOSPITAL ENCOUNTER (OUTPATIENT)
Age: 63
Discharge: HOME | End: 2018-07-22
Payer: MEDICARE

## 2018-07-20 ENCOUNTER — HOSPITAL ENCOUNTER (OUTPATIENT)
Dept: GENERAL RADIOLOGY | Age: 63
Discharge: HOME | End: 2018-07-22
Payer: MEDICARE

## 2018-07-20 VITALS
OXYGEN SATURATION: 97 % | HEART RATE: 72 BPM | SYSTOLIC BLOOD PRESSURE: 120 MMHG | RESPIRATION RATE: 20 BRPM | DIASTOLIC BLOOD PRESSURE: 70 MMHG

## 2018-07-20 DIAGNOSIS — M19.042 ARTHRITIS OF BOTH HANDS: ICD-10-CM

## 2018-07-20 DIAGNOSIS — I10 ESSENTIAL HYPERTENSION: ICD-10-CM

## 2018-07-20 DIAGNOSIS — M19.041 ARTHRITIS OF BOTH HANDS: Primary | ICD-10-CM

## 2018-07-20 DIAGNOSIS — E44.1 MILD MALNUTRITION (HCC): ICD-10-CM

## 2018-07-20 DIAGNOSIS — M19.041 ARTHRITIS OF BOTH HANDS: ICD-10-CM

## 2018-07-20 DIAGNOSIS — E78.2 MIXED HYPERLIPIDEMIA: ICD-10-CM

## 2018-07-20 DIAGNOSIS — J41.0 SIMPLE CHRONIC BRONCHITIS (HCC): ICD-10-CM

## 2018-07-20 DIAGNOSIS — I73.9 PAD (PERIPHERAL ARTERY DISEASE) (HCC): ICD-10-CM

## 2018-07-20 DIAGNOSIS — M19.042 ARTHRITIS OF BOTH HANDS: Primary | ICD-10-CM

## 2018-07-20 DIAGNOSIS — E11.59 TYPE 2 DIABETES MELLITUS WITH OTHER CIRCULATORY COMPLICATION, UNSPECIFIED LONG TERM INSULIN USE STATUS: ICD-10-CM

## 2018-07-20 PROCEDURE — 99214 OFFICE O/P EST MOD 30 MIN: CPT | Performed by: INTERNAL MEDICINE

## 2018-07-20 PROCEDURE — 73130 X-RAY EXAM OF HAND: CPT

## 2018-07-20 PROCEDURE — G8926 SPIRO NO PERF OR DOC: HCPCS | Performed by: INTERNAL MEDICINE

## 2018-07-20 PROCEDURE — 4040F PNEUMOC VAC/ADMIN/RCVD: CPT | Performed by: INTERNAL MEDICINE

## 2018-07-20 PROCEDURE — 3023F SPIROM DOC REV: CPT | Performed by: INTERNAL MEDICINE

## 2018-07-20 PROCEDURE — G8427 DOCREV CUR MEDS BY ELIG CLIN: HCPCS | Performed by: INTERNAL MEDICINE

## 2018-07-20 PROCEDURE — 3017F COLORECTAL CA SCREEN DOC REV: CPT | Performed by: INTERNAL MEDICINE

## 2018-07-20 PROCEDURE — 3044F HG A1C LEVEL LT 7.0%: CPT | Performed by: INTERNAL MEDICINE

## 2018-07-20 PROCEDURE — G8417 CALC BMI ABV UP PARAM F/U: HCPCS | Performed by: INTERNAL MEDICINE

## 2018-07-20 PROCEDURE — 1123F ACP DISCUSS/DSCN MKR DOCD: CPT | Performed by: INTERNAL MEDICINE

## 2018-07-20 PROCEDURE — 2022F DILAT RTA XM EVC RTNOPTHY: CPT | Performed by: INTERNAL MEDICINE

## 2018-07-20 PROCEDURE — 1101F PT FALLS ASSESS-DOCD LE1/YR: CPT | Performed by: INTERNAL MEDICINE

## 2018-07-20 PROCEDURE — 1036F TOBACCO NON-USER: CPT | Performed by: INTERNAL MEDICINE

## 2018-07-20 RX ORDER — CLONIDINE HYDROCHLORIDE 0.1 MG/1
0.1 TABLET ORAL DAILY
Qty: 90 TABLET | Refills: 1 | Status: SHIPPED | OUTPATIENT
Start: 2018-07-20 | End: 2019-01-04 | Stop reason: SDUPTHER

## 2018-07-20 RX ORDER — CLOPIDOGREL BISULFATE 75 MG/1
TABLET ORAL
Qty: 30 TABLET | Refills: 5 | Status: SHIPPED | OUTPATIENT
Start: 2018-07-20 | End: 2019-01-04 | Stop reason: SDUPTHER

## 2018-07-20 RX ORDER — GABAPENTIN 400 MG/1
400 CAPSULE ORAL 4 TIMES DAILY
Qty: 120 CAPSULE | Refills: 0 | Status: SHIPPED | OUTPATIENT
Start: 2018-07-20 | End: 2020-05-22 | Stop reason: ALTCHOICE

## 2018-07-20 RX ORDER — MELOXICAM 15 MG/1
15 TABLET ORAL DAILY
Qty: 30 TABLET | Refills: 5 | Status: SHIPPED | OUTPATIENT
Start: 2018-07-20 | End: 2019-01-04 | Stop reason: SDUPTHER

## 2018-07-20 RX ORDER — PSEUDOEPHEDRINE HCL 30 MG
100 TABLET ORAL 2 TIMES DAILY
Qty: 60 CAPSULE | Refills: 5 | Status: SHIPPED | OUTPATIENT
Start: 2018-07-20 | End: 2019-01-04 | Stop reason: SDUPTHER

## 2018-07-20 RX ORDER — GLIPIZIDE 10 MG/1
TABLET, FILM COATED, EXTENDED RELEASE ORAL
Qty: 30 TABLET | Refills: 5 | Status: SHIPPED | OUTPATIENT
Start: 2018-07-20 | End: 2019-01-04 | Stop reason: SDUPTHER

## 2018-07-20 RX ORDER — CETIRIZINE HYDROCHLORIDE 10 MG/1
10 TABLET ORAL DAILY
Qty: 30 TABLET | Refills: 5 | Status: SHIPPED | OUTPATIENT
Start: 2018-07-20 | End: 2019-01-04 | Stop reason: SDUPTHER

## 2018-07-20 RX ORDER — SERTRALINE HYDROCHLORIDE 25 MG/1
TABLET, FILM COATED ORAL
Qty: 90 TABLET | Refills: 1 | Status: SHIPPED | OUTPATIENT
Start: 2018-07-20 | End: 2019-01-04 | Stop reason: SDUPTHER

## 2018-07-20 RX ORDER — ASPIRIN 81 MG/1
81 TABLET ORAL DAILY
COMMUNITY
End: 2018-07-20

## 2018-07-20 RX ORDER — AMLODIPINE BESYLATE 10 MG/1
TABLET ORAL
Qty: 30 TABLET | Refills: 5 | Status: SHIPPED | OUTPATIENT
Start: 2018-07-20 | End: 2019-01-04 | Stop reason: SDUPTHER

## 2018-07-20 RX ORDER — LISINOPRIL 2.5 MG/1
2.5 TABLET ORAL DAILY
Qty: 30 TABLET | Refills: 5 | Status: SHIPPED | OUTPATIENT
Start: 2018-07-20 | End: 2019-01-04 | Stop reason: SDUPTHER

## 2018-07-20 RX ORDER — ALOGLIPTIN 12.5 MG/1
12.5 TABLET, FILM COATED ORAL DAILY
Qty: 30 TABLET | Refills: 5 | Status: SHIPPED | OUTPATIENT
Start: 2018-07-20 | End: 2018-07-23 | Stop reason: CLARIF

## 2018-07-20 RX ORDER — FERROUS SULFATE 325(65) MG
1 TABLET ORAL 2 TIMES DAILY
Qty: 30 TABLET | Refills: 3 | Status: SHIPPED | OUTPATIENT
Start: 2018-07-20 | End: 2018-08-20 | Stop reason: SDUPTHER

## 2018-07-20 RX ORDER — LOVASTATIN 20 MG/1
TABLET ORAL
Qty: 30 TABLET | Refills: 5 | Status: SHIPPED | OUTPATIENT
Start: 2018-07-20 | End: 2019-01-04 | Stop reason: SDUPTHER

## 2018-07-20 ASSESSMENT — ENCOUNTER SYMPTOMS
SHORTNESS OF BREATH: 0
ABDOMINAL PAIN: 0
HEARTBURN: 0
SORE THROAT: 0
NAUSEA: 0
CONSTIPATION: 0
COUGH: 0
BLURRED VISION: 0
DIARRHEA: 0
VOMITING: 0

## 2018-07-20 NOTE — PROGRESS NOTES
HPI Notes    Name: Wilber Diane  : 1955         Chief Complaint:     Chief Complaint   Patient presents with    Hand Pain     both hands and wrist are reddened and swollen-started in the left hand first and now is in the right       History of Present Illness:        Mr Adolfo Miles presents to office complaining of swelling, pain and redness in bilateral wrists and hands. Symptoms started approximately 5 days ago. He initially had his left hand and wrist swollen and painful. Then the swelling went down and he noticed redness over the overlying skin. Yesterday he developed the same symptoms in his right hand/wrist  He has a history of arthritis in hands and apparently ran out of more because several months ago  He is also here to follow-up for hypertension, hyperlipidemia  He has severe peripheral arterial disease and follows up with vascular surgeon in [place] .  He had undergone several interventions on the right lower extremity, has chronic wound in his right foot/Leg. Currently follows up with a podiatrist in [place] and has of wound VAC in place    I reviewed his current medications and it looks like he ran out of multiple medications for the past almost 3 months      Hypertension   This is a chronic problem. The current episode started more than 1 year ago. The problem is unchanged. The problem is controlled. Pertinent negatives include no blurred vision, chest pain, headaches, malaise/fatigue, palpitations or shortness of breath. Risk factors for coronary artery disease include diabetes mellitus, dyslipidemia, smoking/tobacco exposure and male gender. Past treatments include ACE inhibitors, beta blockers and central alpha agonists. The current treatment provides significant improvement. Hypertensive end-organ damage includes PVD. There is no history of heart failure. Hyperlipidemia   This is a chronic problem. The problem is controlled. Recent lipid tests were reviewed and are variable. reports that he does not drink alcohol. Drug Use:  has no drug history on file. Family History:     Family History   Problem Relation Age of Onset    Diabetes Father        Review of Systems:         Review of Systems   Constitutional: Negative for chills, fever, malaise/fatigue and weight loss. HENT: Negative for congestion and sore throat. Eyes: Negative for blurred vision. Respiratory: Negative for cough and shortness of breath. Cardiovascular: Negative for chest pain and palpitations. Gastrointestinal: Negative for abdominal pain, constipation, diarrhea, heartburn, nausea and vomiting. Genitourinary: Negative for dysuria. Musculoskeletal: Positive for joint pain (bilateral wristsand hands). Skin: Negative for rash. Neurological: Negative for headaches. Psychiatric/Behavioral: Negative for depression. The patient is not nervous/anxious. Physical Exam:     Vitals:  /70 (Site: Left Arm, Position: Sitting, Cuff Size: Large Adult)   Pulse 72   Resp 20   SpO2 97%       Physical Exam   Constitutional: He is oriented to person, place, and time. He appears well-developed and well-nourished. No distress. HENT:   Head: Normocephalic and atraumatic. Mouth/Throat: Oropharynx is clear and moist. No oropharyngeal exudate. Neck: No thyromegaly present. Cardiovascular: Normal rate, regular rhythm and normal heart sounds. No murmur heard. Pulmonary/Chest: Effort normal and breath sounds normal. He has no wheezes. He has no rales. Abdominal: Soft. Bowel sounds are normal. He exhibits no distension and no mass. There is no tenderness. Musculoskeletal: Normal range of motion. He exhibits edema (both wrists), tenderness (BL wrists) and deformity (BL wrists and hand joints deformities noted). RLE in a boot   Lymphadenopathy:     He has no cervical adenopathy. Neurological: He is alert and oriented to person, place, and time.  Coordination abnormal.   Skin: Skin is warm and dry. No rash noted. Redness over the BL wrists and hands present  PICC line present in the left upper extremity, site looks clean, no discharge   Psychiatric: He has a normal mood and affect. His behavior is normal. Judgment normal.   Vitals reviewed. Data:     Lab Results   Component Value Date     03/23/2018    K 4.3 03/23/2018     03/23/2018    CO2 24 03/23/2018    BUN 24 03/23/2018    CREATININE 0.66 03/23/2018    GLUCOSE 149 03/23/2018    PROT  03/22/2018     DISREGARD RESULTS. TEST WAS ORDERED ON INCORRECT PATIENT. PROT 7.1 03/22/2018    LABALBU  03/22/2018     DISREGARD RESULTS. TEST WAS ORDERED ON INCORRECT PATIENT. LABALBU 3.8 03/22/2018    BILITOT  03/22/2018     DISREGARD RESULTS. TEST WAS ORDERED ON INCORRECT PATIENT. BILITOT 0.25 03/22/2018    ALKPHOS  03/22/2018     DISREGARD RESULTS. TEST WAS ORDERED ON INCORRECT PATIENT. ALKPHOS 89 03/22/2018    AST  03/22/2018     DISREGARD RESULTS. TEST WAS ORDERED ON INCORRECT PATIENT. AST 9 03/22/2018    ALT  03/22/2018     DISREGARD RESULTS. TEST WAS ORDERED ON INCORRECT PATIENT. ALT 7 03/22/2018     Lab Results   Component Value Date    WBC 7.7 03/23/2018    RBC 3.63 03/23/2018    HGB 10.7 03/23/2018    HCT 32.3 03/23/2018    MCV 88.9 03/23/2018    MCH 29.4 03/23/2018    MCHC 33.1 03/23/2018    RDW 13.9 03/23/2018     03/23/2018    MPV NOT REPORTED 03/23/2018     No results found for: TSH  Lab Results   Component Value Date    CHOL 116 03/05/2018    HDL 32 03/05/2018    LABA1C 6.6 03/05/2018          Assessment & Plan        Diagnosis Orders   1. Arthritis of both hands   The pain and swelling in both hands/wrists is most likely secondary to arthritis flareup.   We will order hand x-rays, resume Mobic. meloxicam (MOBIC) 15 MG tablet    XR HAND LEFT (MIN 3 VIEWS)    XR HAND RIGHT (MIN 3 VIEWS)   2. Type 2 diabetes mellitus with other circulatory complication, unspecified long term insulin use status ([address] DAY     Dispense:  30 tablet     Refill:  5    glipiZIDE (GLUCOTROL XL) 10 MG extended release tablet     Sig: TAKE 1 TABLET BY MOUTH DAILY     Dispense:  30 tablet     Refill:  5    cloNIDine (CATAPRES) 0.1 MG tablet     Sig: Take 1 tablet by mouth daily     Dispense:  90 tablet     Refill:  1    gabapentin (NEURONTIN) 400 MG capsule     Sig: Take 1 capsule by mouth 4 times daily for 30 days. .     Dispense:  120 capsule     Refill:  0     Orders Placed This Encounter   Procedures    XR HAND LEFT (MIN 3 VIEWS)     Standing Status:   Future     Standing Expiration Date:   7/20/2019    XR HAND RIGHT (MIN 3 VIEWS)     Standing Status:   Future     Standing Expiration Date:   7/20/2019         There are no Patient Instructions on file for this visit.     Electronically signed by Marek Thompson MD on 7/20/2018 at 2:05 PM           Completed Refills   Requested Prescriptions     Signed Prescriptions Disp Refills    alogliptin (NESINA) 12.5 MG TABS tablet 30 tablet 5     Sig: Take 1 tablet by mouth daily    docusate (COLACE, DULCOLAX) 100 MG CAPS 60 capsule 5     Sig: Take 100 mg by mouth 2 times daily    lisinopril (PRINIVIL;ZESTRIL) 2.5 MG tablet 30 tablet 5     Sig: Take 1 tablet by mouth daily    ferrous sulfate 325 (65 Fe) MG tablet 30 tablet 3     Sig: Take 1 tablet by mouth 2 times daily    diclofenac (VOLTAREN) 50 MG EC tablet 90 tablet 5     Sig: TAKE 1 TABLET BY MOUTH 3 TIMES DAILY (WITH MEALS)    cetirizine (ZYRTEC ALLERGY) 10 MG tablet 30 tablet 5     Sig: Take 1 tablet by mouth daily    meloxicam (MOBIC) 15 MG tablet 30 tablet 5     Sig: Take 1 tablet by mouth daily    clopidogrel (PLAVIX) 75 MG tablet 30 tablet 5     Sig: TAKE 1 TABLET BY MOUTH DAILY FOR 30DAYS    sertraline (ZOLOFT) 25 MG tablet 90 tablet 1     Sig: TAKE 1 TABLET BY MOUTH DAILY    metoprolol tartrate (LOPRESSOR) 25 MG tablet 60 tablet 5     Sig: TAKE 1/2TAB BY MOUTH TWICE A DAY FOR 30DAYS    lovastatin (MEVACOR) 20 MG tablet 30 tablet 5     Sig: TAKE 1 TABLET BY MOUTH DAILY    amLODIPine (NORVASC) 10 MG tablet 30 tablet 5     Sig: TAKE 1 TABLET BY MOUTH EVERY DAY    glipiZIDE (GLUCOTROL XL) 10 MG extended release tablet 30 tablet 5     Sig: TAKE 1 TABLET BY MOUTH DAILY    cloNIDine (CATAPRES) 0.1 MG tablet 90 tablet 1     Sig: Take 1 tablet by mouth daily    gabapentin (NEURONTIN) 400 MG capsule 120 capsule 0     Sig: Take 1 capsule by mouth 4 times daily for 30 days. Kaykay Avina

## 2018-08-20 ENCOUNTER — TELEPHONE (OUTPATIENT)
Dept: FAMILY MEDICINE CLINIC | Age: 63
End: 2018-08-20

## 2018-08-20 RX ORDER — FERROUS SULFATE 325(65) MG
1 TABLET ORAL 2 TIMES DAILY
Qty: 60 TABLET | Refills: 3 | Status: SHIPPED | OUTPATIENT
Start: 2018-08-20 | End: 2019-01-04 | Stop reason: SDUPTHER

## 2018-08-20 NOTE — TELEPHONE ENCOUNTER
Pt's Rx for ferrous sulfate is bid, only qty of 30 was sent in. Could you send in new Rx for qty 60 with refills? Health Maintenance   Topic Date Due    AAA screen  1955    Shingles Vaccine (1 of 2 - 2 Dose Series) 02/22/2003    Diabetic microalbuminuria test  03/04/2017    Pneumococcal low/med risk (1 of 2 - PCV13) 02/22/2018    Flu vaccine (1) 09/01/2018    Diabetic retinal exam  11/21/2018    A1C test (Diabetic or Prediabetic)  03/05/2019    Lipid screen  03/05/2019    Diabetic foot exam  03/22/2019    Potassium monitoring  03/23/2019    Creatinine monitoring  03/23/2019    Colon cancer screen colonoscopy  12/10/2022    DTaP/Tdap/Td vaccine (2 - Td) 08/19/2025    Hepatitis C screen  Completed    HIV screen  Completed             (applicable per patient's age: Cancer Screenings, Depression Screening, Fall Risk Screening, Immunizations)    Hemoglobin A1C (%)   Date Value   03/05/2018 6.6   11/10/2017 8.7   03/31/2017 5.6     Microalb/Crt. Ratio (mcg/mg creat)   Date Value   03/04/2016 17 (H)     LDL Cholesterol (mg/dL)   Date Value   03/05/2018 57     AST (U/L)   Date Value   03/22/2018     DISREGARD RESULTS. TEST WAS ORDERED ON INCORRECT PATIENT.   03/22/2018 9     ALT (U/L)   Date Value   03/22/2018     DISREGARD RESULTS.  TEST WAS ORDERED ON INCORRECT PATIENT.   03/22/2018 7     BUN (mg/dL)   Date Value   03/23/2018 24 (H)      (goal A1C is < 7)   (goal LDL is <100) need 30-50% reduction from baseline     BP Readings from Last 3 Encounters:   07/20/18 120/70   03/23/18 127/66   03/22/18 110/70    (goal /80)      All Future Testing planned in [facility]:  Lab Frequency Next Occurrence       Next Visit Date:  Future Appointments  Date Time Provider Shira Becerra   9/20/2018 1:00 PM Melissa Bella, Blossom Akira Huertas            Patient Active Problem List:     Deafness     PAD (peripheral artery disease) ([address]     Hypertension     Ulcer of right leg ([facility]     Type 2 diabetes

## 2018-09-21 ENCOUNTER — OFFICE VISIT (OUTPATIENT)
Dept: FAMILY MEDICINE CLINIC | Age: 63
End: 2018-09-21
Payer: MEDICARE

## 2018-09-21 VITALS
HEART RATE: 68 BPM | HEIGHT: 71 IN | DIASTOLIC BLOOD PRESSURE: 60 MMHG | SYSTOLIC BLOOD PRESSURE: 110 MMHG | WEIGHT: 199 LBS | RESPIRATION RATE: 18 BRPM | BODY MASS INDEX: 27.86 KG/M2

## 2018-09-21 DIAGNOSIS — E78.2 MIXED HYPERLIPIDEMIA: ICD-10-CM

## 2018-09-21 DIAGNOSIS — L97.911 ULCER OF RIGHT LOWER EXTREMITY, LIMITED TO BREAKDOWN OF SKIN (HCC): ICD-10-CM

## 2018-09-21 DIAGNOSIS — E11.49 OTHER DIABETIC NEUROLOGICAL COMPLICATION ASSOCIATED WITH TYPE 2 DIABETES MELLITUS (HCC): ICD-10-CM

## 2018-09-21 DIAGNOSIS — I10 ESSENTIAL HYPERTENSION: ICD-10-CM

## 2018-09-21 DIAGNOSIS — E11.59 TYPE 2 DIABETES MELLITUS WITH OTHER CIRCULATORY COMPLICATION, UNSPECIFIED WHETHER LONG TERM INSULIN USE (HCC): Primary | ICD-10-CM

## 2018-09-21 DIAGNOSIS — Z23 NEED FOR STREPTOCOCCUS PNEUMONIAE VACCINATION: ICD-10-CM

## 2018-09-21 LAB — HBA1C MFR BLD: 7.2 %

## 2018-09-21 PROCEDURE — 90670 PCV13 VACCINE IM: CPT | Performed by: INTERNAL MEDICINE

## 2018-09-21 PROCEDURE — 83036 HEMOGLOBIN GLYCOSYLATED A1C: CPT | Performed by: INTERNAL MEDICINE

## 2018-09-21 PROCEDURE — G0009 ADMIN PNEUMOCOCCAL VACCINE: HCPCS | Performed by: INTERNAL MEDICINE

## 2018-09-21 PROCEDURE — 4040F PNEUMOC VAC/ADMIN/RCVD: CPT | Performed by: INTERNAL MEDICINE

## 2018-09-21 PROCEDURE — 1036F TOBACCO NON-USER: CPT | Performed by: INTERNAL MEDICINE

## 2018-09-21 PROCEDURE — 2022F DILAT RTA XM EVC RTNOPTHY: CPT | Performed by: INTERNAL MEDICINE

## 2018-09-21 PROCEDURE — 99214 OFFICE O/P EST MOD 30 MIN: CPT | Performed by: INTERNAL MEDICINE

## 2018-09-21 PROCEDURE — 3045F PR MOST RECENT HEMOGLOBIN A1C LEVEL 7.0-9.0%: CPT | Performed by: INTERNAL MEDICINE

## 2018-09-21 PROCEDURE — G8427 DOCREV CUR MEDS BY ELIG CLIN: HCPCS | Performed by: INTERNAL MEDICINE

## 2018-09-21 PROCEDURE — G8417 CALC BMI ABV UP PARAM F/U: HCPCS | Performed by: INTERNAL MEDICINE

## 2018-09-21 PROCEDURE — 1123F ACP DISCUSS/DSCN MKR DOCD: CPT | Performed by: INTERNAL MEDICINE

## 2018-09-21 PROCEDURE — 3017F COLORECTAL CA SCREEN DOC REV: CPT | Performed by: INTERNAL MEDICINE

## 2018-09-21 PROCEDURE — 1101F PT FALLS ASSESS-DOCD LE1/YR: CPT | Performed by: INTERNAL MEDICINE

## 2018-09-21 RX ORDER — ZINC SULFATE 50(220)MG
220 CAPSULE ORAL DAILY
Qty: 30 CAPSULE | Refills: 0 | COMMUNITY
Start: 2018-09-21 | End: 2019-01-04 | Stop reason: SDUPTHER

## 2018-09-21 RX ORDER — PREGABALIN 75 MG/1
75 CAPSULE ORAL 3 TIMES DAILY
Qty: 90 CAPSULE | Refills: 2 | Status: SHIPPED | OUTPATIENT
Start: 2018-09-21 | End: 2019-04-04 | Stop reason: DRUGHIGH

## 2018-09-21 RX ORDER — GABAPENTIN 400 MG/1
400 CAPSULE ORAL 4 TIMES DAILY
Qty: 120 CAPSULE | Refills: 2 | Status: CANCELLED | OUTPATIENT
Start: 2018-09-21 | End: 2018-10-21

## 2018-09-21 ASSESSMENT — ENCOUNTER SYMPTOMS
WHEEZING: 0
ABDOMINAL PAIN: 0
BLURRED VISION: 0
COUGH: 0
VOMITING: 0
HEARTBURN: 0
NAUSEA: 0
CONSTIPATION: 1
DIARRHEA: 1
SORE THROAT: 0
SHORTNESS OF BREATH: 0

## 2018-09-21 NOTE — PROGRESS NOTES
After obtaining consent, and per orders of Dr. Jim Fowler, injection of Prevnar 13 given in Left deltoid by Jasper Bender. Patient instructed to remain in clinic for 20 minutes afterwards, and to report any adverse reaction to me immediately.

## 2018-09-21 NOTE — PROGRESS NOTES
aspirin 81 MG tablet Take 81 mg by mouth daily. Yes Historical Provider, MD   gabapentin (NEURONTIN) 400 MG capsule Take 1 capsule by mouth 4 times daily for 30 days. . 7/20/18 8/19/18  Avani Cummins MD        Allergies:       Patient has no known allergies. Social History:     Tobacco:    reports that he has quit smoking. His smoking use included Cigarettes. He has a 15.00 pack-year smoking history. He has never used smokeless tobacco.  Alcohol:      reports that he does not drink alcohol. Drug Use:  has no drug history on file. Family History:     Family History   Problem Relation Age of Onset    Diabetes Father        Review of Systems:         Review of Systems   Constitutional: Positive for fatigue and malaise/fatigue. Negative for chills, fever and weight loss. HENT: Negative for congestion and sore throat. Eyes: Negative for blurred vision. Respiratory: Negative for cough, shortness of breath and wheezing. Cardiovascular: Negative for chest pain and palpitations. Gastrointestinal: Positive for constipation and diarrhea. Negative for abdominal pain, heartburn, nausea and vomiting. Genitourinary: Negative for dysuria. Musculoskeletal: Negative for myalgias. Skin: Negative for rash. Neurological: Negative for dizziness and headaches. Psychiatric/Behavioral: Negative for confusion and depression. The patient is not nervous/anxious and does not have insomnia. Physical Exam:     Vitals:  /60 (Site: Left Upper Arm, Position: Sitting, Cuff Size: Large Adult)   Pulse 68   Resp 18   Ht 5' 11\" (1.803 m)   Wt 199 lb (90.3 kg)   BMI 27.75 kg/m²       Physical Exam   Constitutional: He is oriented to person, place, and time. He appears well-developed and well-nourished. No distress. HENT:   Head: Normocephalic and atraumatic. Right Ear: External ear normal.   Left Ear: External ear normal.   Mouth/Throat: Oropharynx is clear and moist. No oropharyngeal exudate. Component Value Date    CHOL 116 03/05/2018    HDL 32 03/05/2018    LABA1C 7.2 09/21/2018          Assessment & Plan        Diagnosis Orders   1. Type 2 diabetes mellitus with other circulatory complication, unspecified whether long term insulin use (HCC)   Hemoglobin A1c when up to 7.2%. Evidently the patient has not been taking Januvia at home. He is not sure why the prescription was not picked up in the past.  We'll reorder again. Discussed with his sister. POCT glycosylated hemoglobin (Hb A1C)    SITagliptin (JANUVIA) 50 MG tablet   2. Ulcer of right lower extremity, limited to breakdown of skin [facility])   Follow-up with his podiatrist at [facility].  High-protein diet recommended. I will also thinking sulfate to help with wound healing zinc sulfate (ZINC-220) 220 (50 Zn) MG capsule   3. Essential hypertension   Blood pressure stable on current medications, continue the same    4. Mixed hyperlipidemia   Continue on Mevacor    5. Other diabetic neurological complication associated with type 2 diabetes mellitus ([address]   Patient's neuropathy is not responding to Neurontin anymore. Will order Lyrica pregabalin (LYRICA) 75 MG capsule   6. Need for Streptococcus pneumoniae vaccination  PREVNAR 13 IM (Pneumococcal conjugate vaccine 13-valent)                   Completed Refills   Requested Prescriptions     Signed Prescriptions Disp Refills    pregabalin (LYRICA) 75 MG capsule 90 capsule 2     Sig: Take 1 capsule by mouth 3 times daily for 30 days. Carry Amsler zinc sulfate (ZINC-220) 220 (50 Zn) MG capsule 30 capsule 0     Sig: Take 1 capsule by mouth daily    SITagliptin (JANUVIA) 50 MG tablet 30 tablet 5     Sig: Take 1 tablet by mouth daily     Return in about 3 months (around 12/21/2018) for diabetes, HTN, hyperlipidemia. Orders Placed This Encounter   Medications    pregabalin (LYRICA) 75 MG capsule     Sig: Take 1 capsule by mouth 3 times daily for 30 days. .     Dispense:  90 capsule     Refill:  2    zinc

## 2019-01-04 ENCOUNTER — OFFICE VISIT (OUTPATIENT)
Dept: FAMILY MEDICINE CLINIC | Age: 64
End: 2019-01-04
Payer: MEDICARE

## 2019-01-04 VITALS
OXYGEN SATURATION: 97 % | DIASTOLIC BLOOD PRESSURE: 70 MMHG | SYSTOLIC BLOOD PRESSURE: 114 MMHG | WEIGHT: 195 LBS | BODY MASS INDEX: 27.3 KG/M2 | HEIGHT: 71 IN | HEART RATE: 84 BPM

## 2019-01-04 DIAGNOSIS — M19.041 ARTHRITIS OF BOTH HANDS: ICD-10-CM

## 2019-01-04 DIAGNOSIS — L97.911 ULCER OF RIGHT LOWER EXTREMITY, LIMITED TO BREAKDOWN OF SKIN (HCC): ICD-10-CM

## 2019-01-04 DIAGNOSIS — E11.621 DIABETES MELLITUS WITH FOOT ULCER AND GANGRENE (HCC): ICD-10-CM

## 2019-01-04 DIAGNOSIS — E78.2 MIXED HYPERLIPIDEMIA: ICD-10-CM

## 2019-01-04 DIAGNOSIS — I73.9 PAD (PERIPHERAL ARTERY DISEASE) (HCC): ICD-10-CM

## 2019-01-04 DIAGNOSIS — M19.042 ARTHRITIS OF BOTH HANDS: ICD-10-CM

## 2019-01-04 DIAGNOSIS — D64.9 CHRONIC ANEMIA: Primary | ICD-10-CM

## 2019-01-04 DIAGNOSIS — L97.509 DIABETES MELLITUS WITH FOOT ULCER AND GANGRENE (HCC): ICD-10-CM

## 2019-01-04 DIAGNOSIS — I10 ESSENTIAL HYPERTENSION: ICD-10-CM

## 2019-01-04 DIAGNOSIS — E11.42 DIABETIC PERIPHERAL NEUROPATHY (HCC): ICD-10-CM

## 2019-01-04 DIAGNOSIS — E11.59 TYPE 2 DIABETES MELLITUS WITH OTHER CIRCULATORY COMPLICATION, UNSPECIFIED WHETHER LONG TERM INSULIN USE (HCC): ICD-10-CM

## 2019-01-04 DIAGNOSIS — E11.52 DIABETES MELLITUS WITH FOOT ULCER AND GANGRENE (HCC): ICD-10-CM

## 2019-01-04 PROBLEM — J41.0 SIMPLE CHRONIC BRONCHITIS (HCC): Status: RESOLVED | Noted: 2018-07-20 | Resolved: 2019-01-04

## 2019-01-04 PROCEDURE — G8427 DOCREV CUR MEDS BY ELIG CLIN: HCPCS | Performed by: INTERNAL MEDICINE

## 2019-01-04 PROCEDURE — G8484 FLU IMMUNIZE NO ADMIN: HCPCS | Performed by: INTERNAL MEDICINE

## 2019-01-04 PROCEDURE — 1123F ACP DISCUSS/DSCN MKR DOCD: CPT | Performed by: INTERNAL MEDICINE

## 2019-01-04 PROCEDURE — 1036F TOBACCO NON-USER: CPT | Performed by: INTERNAL MEDICINE

## 2019-01-04 PROCEDURE — 1101F PT FALLS ASSESS-DOCD LE1/YR: CPT | Performed by: INTERNAL MEDICINE

## 2019-01-04 PROCEDURE — 4040F PNEUMOC VAC/ADMIN/RCVD: CPT | Performed by: INTERNAL MEDICINE

## 2019-01-04 PROCEDURE — 3017F COLORECTAL CA SCREEN DOC REV: CPT | Performed by: INTERNAL MEDICINE

## 2019-01-04 PROCEDURE — 99214 OFFICE O/P EST MOD 30 MIN: CPT | Performed by: INTERNAL MEDICINE

## 2019-01-04 PROCEDURE — 3046F HEMOGLOBIN A1C LEVEL >9.0%: CPT | Performed by: INTERNAL MEDICINE

## 2019-01-04 PROCEDURE — 2022F DILAT RTA XM EVC RTNOPTHY: CPT | Performed by: INTERNAL MEDICINE

## 2019-01-04 PROCEDURE — G8417 CALC BMI ABV UP PARAM F/U: HCPCS | Performed by: INTERNAL MEDICINE

## 2019-01-04 RX ORDER — MELOXICAM 15 MG/1
15 TABLET ORAL DAILY
Qty: 30 TABLET | Refills: 5 | Status: SHIPPED | OUTPATIENT
Start: 2019-01-04 | End: 2019-04-04 | Stop reason: ALTCHOICE

## 2019-01-04 RX ORDER — SERTRALINE HYDROCHLORIDE 25 MG/1
TABLET, FILM COATED ORAL
Qty: 30 TABLET | Refills: 5 | Status: SHIPPED | OUTPATIENT
Start: 2019-01-04 | End: 2019-04-04 | Stop reason: DRUGHIGH

## 2019-01-04 RX ORDER — AMLODIPINE BESYLATE 10 MG/1
TABLET ORAL
Qty: 30 TABLET | Refills: 5 | Status: SHIPPED | OUTPATIENT
Start: 2019-01-04 | End: 2019-09-28 | Stop reason: SDUPTHER

## 2019-01-04 RX ORDER — CETIRIZINE HYDROCHLORIDE 10 MG/1
10 TABLET ORAL DAILY
Qty: 30 TABLET | Refills: 5 | Status: SHIPPED | OUTPATIENT
Start: 2019-01-04 | End: 2020-12-15 | Stop reason: SDUPTHER

## 2019-01-04 RX ORDER — CLOPIDOGREL BISULFATE 75 MG/1
TABLET ORAL
Qty: 30 TABLET | Refills: 5 | Status: SHIPPED | OUTPATIENT
Start: 2019-01-04 | End: 2019-09-28 | Stop reason: SDUPTHER

## 2019-01-04 RX ORDER — ZINC SULFATE 50(220)MG
220 CAPSULE ORAL DAILY
Qty: 30 CAPSULE | Refills: 0 | COMMUNITY
Start: 2019-01-04 | End: 2021-06-18 | Stop reason: SDUPTHER

## 2019-01-04 RX ORDER — FERROUS SULFATE 325(65) MG
1 TABLET ORAL 2 TIMES DAILY
Qty: 60 TABLET | Refills: 3 | Status: SHIPPED | OUTPATIENT
Start: 2019-01-04 | End: 2020-12-15 | Stop reason: SDUPTHER

## 2019-01-04 RX ORDER — CLONIDINE HYDROCHLORIDE 0.1 MG/1
0.1 TABLET ORAL DAILY
Qty: 90 TABLET | Refills: 1 | Status: SHIPPED | OUTPATIENT
Start: 2019-01-04 | End: 2020-12-15 | Stop reason: SDUPTHER

## 2019-01-04 RX ORDER — GLIPIZIDE 10 MG/1
TABLET, FILM COATED, EXTENDED RELEASE ORAL
Qty: 30 TABLET | Refills: 5 | Status: SHIPPED | OUTPATIENT
Start: 2019-01-04 | End: 2020-09-02

## 2019-01-04 RX ORDER — LISINOPRIL 2.5 MG/1
2.5 TABLET ORAL DAILY
Qty: 30 TABLET | Refills: 5 | Status: SHIPPED | OUTPATIENT
Start: 2019-01-04 | End: 2019-09-28 | Stop reason: SDUPTHER

## 2019-01-04 RX ORDER — PSEUDOEPHEDRINE HCL 30 MG
100 TABLET ORAL 2 TIMES DAILY
Qty: 60 CAPSULE | Refills: 5 | Status: SHIPPED | OUTPATIENT
Start: 2019-01-04 | End: 2020-12-15 | Stop reason: SDUPTHER

## 2019-01-04 RX ORDER — LOVASTATIN 20 MG/1
TABLET ORAL
Qty: 30 TABLET | Refills: 5 | Status: SHIPPED | OUTPATIENT
Start: 2019-01-04 | End: 2019-09-28 | Stop reason: SDUPTHER

## 2019-01-04 ASSESSMENT — ENCOUNTER SYMPTOMS
ALLERGIC/IMMUNOLOGIC NEGATIVE: 1
BLURRED VISION: 0
SHORTNESS OF BREATH: 0
WHEEZING: 0
ABDOMINAL PAIN: 0
BLOOD IN STOOL: 0
COUGH: 0
SORE THROAT: 0
NAUSEA: 0
CONSTIPATION: 0

## 2019-01-23 RX ORDER — SERTRALINE HYDROCHLORIDE 25 MG/1
TABLET, FILM COATED ORAL
Qty: 90 TABLET | Refills: 1 | Status: SHIPPED | OUTPATIENT
Start: 2019-01-23 | End: 2020-12-15 | Stop reason: SDUPTHER

## 2019-01-29 DIAGNOSIS — M19.042 ARTHRITIS OF BOTH HANDS: ICD-10-CM

## 2019-01-29 DIAGNOSIS — M19.041 ARTHRITIS OF BOTH HANDS: ICD-10-CM

## 2019-01-29 DIAGNOSIS — I73.9 PAD (PERIPHERAL ARTERY DISEASE) (HCC): ICD-10-CM

## 2019-01-29 DIAGNOSIS — I10 ESSENTIAL HYPERTENSION: ICD-10-CM

## 2019-01-29 DIAGNOSIS — E78.2 MIXED HYPERLIPIDEMIA: ICD-10-CM

## 2019-01-29 RX ORDER — LOVASTATIN 20 MG/1
TABLET ORAL
Qty: 30 TABLET | Refills: 5 | Status: SHIPPED | OUTPATIENT
Start: 2019-01-29 | End: 2020-02-20 | Stop reason: SDUPTHER

## 2019-01-29 RX ORDER — GLIPIZIDE 10 MG/1
TABLET, FILM COATED, EXTENDED RELEASE ORAL
Qty: 30 TABLET | Refills: 5 | Status: SHIPPED | OUTPATIENT
Start: 2019-01-29 | End: 2020-02-20 | Stop reason: SDUPTHER

## 2019-01-29 RX ORDER — AMLODIPINE BESYLATE 10 MG/1
TABLET ORAL
Qty: 30 TABLET | Refills: 5 | Status: SHIPPED | OUTPATIENT
Start: 2019-01-29 | End: 2020-09-17 | Stop reason: SDUPTHER

## 2019-01-29 RX ORDER — CLOPIDOGREL BISULFATE 75 MG/1
TABLET ORAL
Qty: 30 TABLET | Refills: 5 | Status: SHIPPED | OUTPATIENT
Start: 2019-01-29 | End: 2020-02-20 | Stop reason: SDUPTHER

## 2019-01-29 RX ORDER — MELOXICAM 15 MG/1
TABLET ORAL
Qty: 30 TABLET | Refills: 5 | Status: SHIPPED | OUTPATIENT
Start: 2019-01-29 | End: 2019-04-04 | Stop reason: ALTCHOICE

## 2019-02-18 DIAGNOSIS — I10 ESSENTIAL HYPERTENSION: ICD-10-CM

## 2019-02-18 RX ORDER — CLONIDINE HYDROCHLORIDE 0.1 MG/1
TABLET ORAL
Qty: 90 TABLET | Refills: 1 | Status: SHIPPED | OUTPATIENT
Start: 2019-02-18 | End: 2020-02-20 | Stop reason: ALTCHOICE

## 2019-04-04 ENCOUNTER — OFFICE VISIT (OUTPATIENT)
Dept: FAMILY MEDICINE CLINIC | Age: 64
End: 2019-04-04
Payer: MEDICARE

## 2019-04-04 ENCOUNTER — HOSPITAL ENCOUNTER (OUTPATIENT)
Age: 64
Discharge: HOME | End: 2019-04-04
Payer: MEDICARE

## 2019-04-04 VITALS
DIASTOLIC BLOOD PRESSURE: 62 MMHG | SYSTOLIC BLOOD PRESSURE: 130 MMHG | HEIGHT: 71 IN | BODY MASS INDEX: 29.12 KG/M2 | OXYGEN SATURATION: 98 % | HEART RATE: 62 BPM | WEIGHT: 208 LBS

## 2019-04-04 DIAGNOSIS — I10 ESSENTIAL HYPERTENSION: ICD-10-CM

## 2019-04-04 DIAGNOSIS — E78.2 MIXED HYPERLIPIDEMIA: ICD-10-CM

## 2019-04-04 DIAGNOSIS — I73.9 PAD (PERIPHERAL ARTERY DISEASE) (HCC): ICD-10-CM

## 2019-04-04 DIAGNOSIS — E11.59 TYPE 2 DIABETES MELLITUS WITH OTHER CIRCULATORY COMPLICATION, UNSPECIFIED WHETHER LONG TERM INSULIN USE (HCC): Primary | ICD-10-CM

## 2019-04-04 DIAGNOSIS — E11.42 DIABETIC PERIPHERAL NEUROPATHY (HCC): ICD-10-CM

## 2019-04-04 DIAGNOSIS — D64.9 CHRONIC ANEMIA: ICD-10-CM

## 2019-04-04 DIAGNOSIS — Z13.6 SCREENING FOR AAA (ABDOMINAL AORTIC ANEURYSM): ICD-10-CM

## 2019-04-04 LAB
ABSOLUTE EOS #: 0.2 K/UL (ref 0–0.4)
ABSOLUTE IMMATURE GRANULOCYTE: ABNORMAL K/UL (ref 0–0.3)
ABSOLUTE LYMPH #: 1.9 K/UL (ref 1–4.8)
ABSOLUTE MONO #: 0.6 K/UL (ref 0–1)
ANION GAP SERPL CALCULATED.3IONS-SCNC: 12 MMOL/L (ref 9–17)
BASOPHILS # BLD: 1 % (ref 0–2)
BASOPHILS ABSOLUTE: 0.1 K/UL (ref 0–0.2)
BUN BLDV-MCNC: 30 MG/DL (ref 8–23)
BUN/CREAT BLD: 45 (ref 9–20)
CALCIUM SERPL-MCNC: 9.4 MG/DL (ref 8.6–10.4)
CHLORIDE BLD-SCNC: 103 MMOL/L (ref 98–107)
CHOLESTEROL/HDL RATIO: 3.3
CHOLESTEROL: 141 MG/DL
CO2: 22 MMOL/L (ref 20–31)
CREAT SERPL-MCNC: 0.67 MG/DL (ref 0.7–1.2)
DIFFERENTIAL TYPE: YES
EOSINOPHILS RELATIVE PERCENT: 3 % (ref 0–5)
GFR AFRICAN AMERICAN: >60 ML/MIN
GFR NON-AFRICAN AMERICAN: >60 ML/MIN
GFR SERPL CREATININE-BSD FRML MDRD: ABNORMAL ML/MIN/{1.73_M2}
GFR SERPL CREATININE-BSD FRML MDRD: ABNORMAL ML/MIN/{1.73_M2}
GLUCOSE BLD-MCNC: 299 MG/DL (ref 70–99)
HBA1C MFR BLD: 9.4 %
HCT VFR BLD CALC: 39.2 % (ref 41–53)
HDLC SERPL-MCNC: 43 MG/DL
HEMOGLOBIN: 12.7 G/DL (ref 13.5–17.5)
IMMATURE GRANULOCYTES: ABNORMAL %
LDL CHOLESTEROL: 54 MG/DL (ref 0–130)
LYMPHOCYTES # BLD: 21 % (ref 13–44)
MCH RBC QN AUTO: 29.5 PG (ref 26–34)
MCHC RBC AUTO-ENTMCNC: 32.5 G/DL (ref 31–37)
MCV RBC AUTO: 90.8 FL (ref 80–100)
MONOCYTES # BLD: 6 % (ref 5–9)
NRBC AUTOMATED: ABNORMAL PER 100 WBC
PATIENT FASTING?: YES
PDW BLD-RTO: 15.3 % (ref 12.1–15.2)
PLATELET # BLD: 230 K/UL (ref 140–450)
PLATELET ESTIMATE: ABNORMAL
PMV BLD AUTO: ABNORMAL FL (ref 6–12)
POTASSIUM SERPL-SCNC: 4.8 MMOL/L (ref 3.7–5.3)
RBC # BLD: 4.32 M/UL (ref 4.5–5.9)
RBC # BLD: ABNORMAL 10*6/UL
SEG NEUTROPHILS: 69 % (ref 39–75)
SEGMENTED NEUTROPHILS ABSOLUTE COUNT: 6.6 K/UL (ref 2.1–6.5)
SODIUM BLD-SCNC: 137 MMOL/L (ref 135–144)
TRIGL SERPL-MCNC: 218 MG/DL
VLDLC SERPL CALC-MCNC: ABNORMAL MG/DL (ref 1–30)
WBC # BLD: 9.4 K/UL (ref 3.5–11)
WBC # BLD: ABNORMAL 10*3/UL

## 2019-04-04 PROCEDURE — 2022F DILAT RTA XM EVC RTNOPTHY: CPT | Performed by: INTERNAL MEDICINE

## 2019-04-04 PROCEDURE — 83036 HEMOGLOBIN GLYCOSYLATED A1C: CPT | Performed by: INTERNAL MEDICINE

## 2019-04-04 PROCEDURE — 80061 LIPID PANEL: CPT

## 2019-04-04 PROCEDURE — G8427 DOCREV CUR MEDS BY ELIG CLIN: HCPCS | Performed by: INTERNAL MEDICINE

## 2019-04-04 PROCEDURE — G8417 CALC BMI ABV UP PARAM F/U: HCPCS | Performed by: INTERNAL MEDICINE

## 2019-04-04 PROCEDURE — 36415 COLL VENOUS BLD VENIPUNCTURE: CPT

## 2019-04-04 PROCEDURE — 99214 OFFICE O/P EST MOD 30 MIN: CPT | Performed by: INTERNAL MEDICINE

## 2019-04-04 PROCEDURE — 1036F TOBACCO NON-USER: CPT | Performed by: INTERNAL MEDICINE

## 2019-04-04 PROCEDURE — 3017F COLORECTAL CA SCREEN DOC REV: CPT | Performed by: INTERNAL MEDICINE

## 2019-04-04 PROCEDURE — 85025 COMPLETE CBC W/AUTO DIFF WBC: CPT

## 2019-04-04 PROCEDURE — 1123F ACP DISCUSS/DSCN MKR DOCD: CPT | Performed by: INTERNAL MEDICINE

## 2019-04-04 PROCEDURE — 3046F HEMOGLOBIN A1C LEVEL >9.0%: CPT | Performed by: INTERNAL MEDICINE

## 2019-04-04 PROCEDURE — 80048 BASIC METABOLIC PNL TOTAL CA: CPT

## 2019-04-04 PROCEDURE — 4040F PNEUMOC VAC/ADMIN/RCVD: CPT | Performed by: INTERNAL MEDICINE

## 2019-04-04 RX ORDER — PREGABALIN 100 MG/1
100 CAPSULE ORAL 3 TIMES DAILY
Qty: 90 CAPSULE | Refills: 3 | Status: SHIPPED | OUTPATIENT
Start: 2019-04-04 | End: 2020-02-20 | Stop reason: ALTCHOICE

## 2019-04-04 ASSESSMENT — ENCOUNTER SYMPTOMS
WHEEZING: 0
SHORTNESS OF BREATH: 0
ABDOMINAL PAIN: 0
NAUSEA: 0
BLOOD IN STOOL: 0
CONSTIPATION: 0
TROUBLE SWALLOWING: 0
COUGH: 0
SORE THROAT: 0
ALLERGIC/IMMUNOLOGIC NEGATIVE: 1
CHEST TIGHTNESS: 0

## 2019-04-04 ASSESSMENT — PATIENT HEALTH QUESTIONNAIRE - PHQ9
SUM OF ALL RESPONSES TO PHQ9 QUESTIONS 1 & 2: 2
2. FEELING DOWN, DEPRESSED OR HOPELESS: 1
SUM OF ALL RESPONSES TO PHQ QUESTIONS 1-9: 2
SUM OF ALL RESPONSES TO PHQ QUESTIONS 1-9: 2
1. LITTLE INTEREST OR PLEASURE IN DOING THINGS: 1

## 2019-04-04 NOTE — PROGRESS NOTES
HPI Notes    Name: Richar Barton  : 1955         Chief Complaint:     Chief Complaint   Patient presents with    Hyperlipidemia    Hypertension    Diabetes     Last A1c was 7.3, Has not been taking the [place] and [place] for a couple of months, going to get eye exam on monday    Arthritis     Doesnt think the lyrica for hes hands and legs is working very well. is wondering if there is something higher        History of Present Illness:        Monty Contreras presents to office to follow up for DM, HTN , Hyperlipidemia, diabetic peripheral neropathy. Had labs done today. Results reviewed. Glucose was 299. Says not taking Januvia because \"my  told me  it will cause limb loss\". He only takes metformin and Glipizide  C/O peripheral neuropathy pain worsening in feet and hands. Takes Lyrica 75 mg tid but feels needs to take a little bit more to control the pain    Sister also mentions his depression has been worse lately. Monty Done confirms it and says would like to try higher dose of Zoloft. He denies severe depression symptoms or suicidal ideations      Diabetes   He presents for his follow-up diabetic visit. He has type 2 diabetes mellitus. Pertinent negatives for hypoglycemia include no dizziness, headaches or nervousness/anxiousness. Pertinent negatives for diabetes include no chest pain, no fatigue, no polydipsia, no polyuria and no weakness. Diabetic complications include PVD. Pertinent negatives for diabetic complications include no CVA or heart disease. Current diabetic treatment includes oral agent (dual therapy). He is compliant with treatment most of the time. An ACE inhibitor/angiotensin II receptor blocker is being taken. He sees a podiatrist.Eye exam is not current. Hypertension   This is a chronic problem. The current episode started more than 1 year ago. The problem is controlled. Pertinent negatives include no chest pain, headaches, neck pain, palpitations or shortness of breath.  Risk factors for coronary artery disease include diabetes mellitus, dyslipidemia, male gender, smoking/tobacco exposure and obesity. Past treatments include ACE inhibitors, beta blockers, central alpha agonists and calcium channel blockers. The current treatment provides significant improvement. There are no compliance problems. Hypertensive end-organ damage includes PVD. There is no history of angina or CVA. Hyperlipidemia   This is a chronic problem. The problem is controlled. Recent lipid tests were reviewed and are variable. Exacerbating diseases include diabetes. Pertinent negatives include no chest pain, myalgias or shortness of breath. Current antihyperlipidemic treatment includes statins. The current treatment provides significant improvement of lipids. There are no compliance problems. Risk factors for coronary artery disease include diabetes mellitus, dyslipidemia, male sex and hypertension.            Past Medical History:     Past Medical History:   Diagnosis Date    Deafness 9/16/2011    Diabetes mellitus ([facility] [address] 9/16/2011    Dyslipidemia 9/16/2011    Hypertension 9/16/2011    MRSA infection 03/23/2018    foot and blood    PAD (peripheral artery disease) ([facility] [address] 9/16/2011    Sinusitis, chronic 9/16/2011    Type II or unspecified type diabetes mellitus without mention of complication, not stated as uncontrolled       Reviewed all health maintenance requirements and orderedappropriate tests  Health Maintenance Due   Topic Date Due    AAA screen  1955    Shingles Vaccine (1 of 2) 02/22/2003    Diabetic microalbuminuria test  03/04/2017    Diabetic retinal exam  11/21/2018    A1C test (Diabetic or Prediabetic)  12/21/2018    Diabetic foot exam  03/22/2019       Past Surgical History:     Past Surgical History:   Procedure Laterality Date    HERNIA REPAIR  11-98    INTERTROCHANTERIC HIP FRACTURE SURGERY Right     TOE AMPUTATION Right     All toes on right foot, 4th toe on left foot    VASCULAR SURGERY 10/2015    Right Carotid         Medications:       Prior to Admission medications    Medication Sig Start Date End Date Taking? Authorizing Provider   insulin glargine (BASAGLAR KWIKPEN) 100 UNIT/ML injection pen Inject 10 Units into the skin every morning (before breakfast) 4/4/19  Yes Aura Austin MD   Insulin Pen Needle ([employer] PEN NEEDLES) 31G X 6 MM MISC 1 each by Does not apply route daily 4/4/19  Yes Aura Austin MD   sertraline (ZOLOFT) 50 MG tablet Take 1 tablet by mouth daily 4/4/19  Yes Aura Austin MD   pregabalin (LYRICA) 100 MG capsule Take 1 capsule by mouth 3 times daily for 30 days. 4/4/19 5/4/19 Yes Aura Austin MD   amLODIPine (NORVASC) 10 MG tablet TAKE 1 TABLET BY MOUTH EVERY DAY 1/4/19  Yes Aura Austin MD   clopidogrel (PLAVIX) 75 MG tablet TAKE 1 TABLET BY MOUTH DAILY FOR 30DAYS 1/4/19  Yes Aura Austin MD   glipiZIDE (GLUCOTROL XL) 10 MG extended release tablet TAKE 1 TABLET BY MOUTH DAILY 1/4/19  Yes Aura Austin MD   lovastatin (MEVACOR) 20 MG tablet TAKE 1 TABLET BY MOUTH DAILY 1/4/19  Yes Aura Austin MD   metoprolol tartrate (LOPRESSOR) 25 MG tablet TAKE 1/2TAB BY MOUTH TWICE A DAY FOR 30DAYS 1/4/19  Yes Aura Austin MD   cloNIDine (CATAPRES) 0.1 MG tablet Take 1 tablet by mouth daily 1/4/19  Yes Aura Austin MD   metFORMIN (GLUCOPHAGE) 1000 MG tablet Take 1 tablet by mouth 2 times daily (with meals) 1/4/19  Yes Arua Austin MD   cetirizine (ZYRTEC ALLERGY) 10 MG tablet Take 1 tablet by mouth daily 1/4/19  Yes Aura Austin MD   docusate (COLACE, DULCOLAX) 100 MG CAPS Take 100 mg by mouth 2 times daily 1/4/19  Yes Aura Austin MD   lisinopril (PRINIVIL;ZESTRIL) 2.5 MG tablet Take 1 tablet by mouth daily 1/4/19  Yes Aura Austin MD   zinc sulfate (ZINC-220) 220 (50 Zn) MG capsule Take 1 capsule by mouth daily 1/4/19  Yes Aura Austin MD   gabapentin (NEURONTIN) 400 MG capsule Take 1 capsule by mouth 4 times daily for 30 days. Nico Garza 7/20/18 4/4/19 Yes Alfredo Peña MD   fenofibrate 160 MG tablet Take 160 mg by mouth daily. Prescribed by [id number]   Yes Historical Provider, MD   aspirin 81 MG tablet Take 81 mg by mouth daily. Yes Historical Provider, MD   cloNIDine (CATAPRES) 0.1 MG tablet TAKE 1 TABLET BY MOUTH EVERY DAY 2/18/19   Alfredo Peña MD   lovastatin (MEVACOR) 20 MG tablet TAKE 1 TABLET BY MOUTH EVERY DAY 1/29/19   Alfredo Peña MD   glipiZIDE (GLUCOTROL XL) 10 MG extended release tablet TAKE 1 TABLET BY MOUTH EVERY DAY 1/29/19   Alfredo Peña MD   clopidogrel (PLAVIX) 75 MG tablet TAKE 1 TABLET BY MOUTH DAILY FOR 30DAYS 1/29/19   Alfredo Peña MD   amLODIPine (NORVASC) 10 MG tablet TAKE 1 TABLET BY MOUTH EVERY DAY 1/29/19   Alfredo Peña MD   sertraline (ZOLOFT) 25 MG tablet TAKE 1 TABLET BY MOUTH EVERY DAY 1/23/19   Alfredo Peña MD   ferrous sulfate 325 (65 Fe) MG tablet Take 1 tablet by mouth 2 times daily 1/4/19   Alfredo Peña MD        Allergies:       Patient has no known allergies. Social History:     Tobacco: reports that he has quit smoking. His smoking use included cigarettes. He has a 15.00 pack-year smoking history. He has never used smokeless tobacco.  Alcohol:      reports that he does not drink alcohol. Drug Use:  has no drug history on file. Family History:     Family History   Problem Relation Age of Onset    Diabetes Father        Review of Systems:         Review of Systems   Constitutional: Negative for activity change, appetite change, chills, fatigue and unexpected weight change. HENT: Negative for congestion, ear discharge, ear pain, sore throat and trouble swallowing. Eyes: Negative for visual disturbance. Respiratory: Negative for cough, chest tightness, shortness of breath and wheezing. Cardiovascular: Negative for chest pain, palpitations and leg swelling. Gastrointestinal: Negative for abdominal pain, blood in stool, constipation and nausea. Endocrine: Negative for cold intolerance, heat intolerance, polydipsia and polyuria. Genitourinary: Negative for difficulty urinating and dysuria. Musculoskeletal: Positive for arthralgias. Negative for myalgias and neck pain. Skin: Negative for rash. Allergic/Immunologic: Negative. Neurological: Negative for dizziness, weakness and headaches. Psychiatric/Behavioral: Negative for behavioral problems and dysphoric mood. The patient is not nervous/anxious. Physical Exam:     Vitals:  /62 (Site: Left Upper Arm, Position: Sitting, Cuff Size: Medium Adult)   Pulse 62   Ht 5' 11\" (1.803 m)   Wt 208 lb (94.3 kg)   SpO2 98%   BMI 29.01 kg/m²       Physical Exam   Constitutional: He is oriented to person, place, and time. He appears well-developed and well-nourished. No distress. HENT:   Head: Normocephalic and atraumatic. Right Ear: External ear normal.   Left Ear: External ear normal.   Mouth/Throat: Oropharynx is clear and moist. No oropharyngeal exudate. Eyes: Right eye exhibits no discharge. Left eye exhibits no discharge. No scleral icterus. Neck: No thyromegaly present. Cardiovascular: Normal rate, regular rhythm and normal heart sounds. No murmur heard. Pulmonary/Chest: Effort normal and breath sounds normal. He has no wheezes. He has no rales. Abdominal: Soft. Bowel sounds are normal. He exhibits no distension and no mass. There is no tenderness. Musculoskeletal: Normal range of motion. He exhibits no tenderness or deformity. Right foot partial amputation   Lymphadenopathy:     He has no cervical adenopathy. Neurological: He is alert and oriented to person, place, and time. No cranial nerve deficit. Coordination abnormal.   Skin: Skin is warm and dry. No rash noted. Psychiatric: He has a normal mood and affect. His behavior is normal. Judgment normal.   Vitals reviewed.     Diabetic Foot Exam  Has right metatarsal amputation    Pulses:  abnormal - diminished, Edema: trace  Skin: normal    Sensory exam  Monofilament Sensation:  abnormal   (minimum of 5 random plantar locations tested, avoid callous areas - > 1 area with absence of sensation is + for neuropathy)      Plus one of the following  Pinprick:  Impaired          Data:     Lab Results   Component Value Date     04/04/2019    K 4.8 04/04/2019     04/04/2019    CO2 22 04/04/2019    BUN 30 04/04/2019    CREATININE 0.67 04/04/2019    GLUCOSE 299 04/04/2019    PROT  03/22/2018     DISREGARD RESULTS. TEST WAS ORDERED ON INCORRECT PATIENT. PROT 7.1 03/22/2018    LABALBU  03/22/2018     DISREGARD RESULTS. TEST WAS ORDERED ON INCORRECT PATIENT. LABALBU 3.8 03/22/2018    BILITOT  03/22/2018     DISREGARD RESULTS. TEST WAS ORDERED ON INCORRECT PATIENT. BILITOT 0.25 03/22/2018    ALKPHOS  03/22/2018     DISREGARD RESULTS. TEST WAS ORDERED ON INCORRECT PATIENT. ALKPHOS 89 03/22/2018    AST  03/22/2018     DISREGARD RESULTS. TEST WAS ORDERED ON INCORRECT PATIENT. AST 9 03/22/2018    ALT  03/22/2018     DISREGARD RESULTS. TEST WAS ORDERED ON INCORRECT PATIENT. ALT 7 03/22/2018     Lab Results   Component Value Date    WBC 9.4 04/04/2019    RBC 4.32 04/04/2019    HGB 12.7 04/04/2019    HCT 39.2 04/04/2019    MCV 90.8 04/04/2019    MCH 29.5 04/04/2019    MCHC 32.5 04/04/2019    RDW 15.3 04/04/2019     04/04/2019    MPV NOT REPORTED 04/04/2019     No results found for: TSH  Lab Results   Component Value Date    CHOL 141 04/04/2019    HDL 43 04/04/2019    LABA1C 9.4 04/04/2019          Assessment & Plan        Diagnosis Orders   1. Type 2 diabetes mellitus with other circulatory complication, unspecified whether long term insulin use ([address]   Worsening course due to noncompliance with meds. HbA1C is 9.4% today.  Discussed Insulin therapy and after some hesitaion Hugo Fong agreed to use it if it's only oonce a day injection  Start on Glargine 10 u qam.. Instructed to increase to 15 if glucose levels above 200 most of the time. He is also asked to call us. Follow up in 3 months  Declined [facility] to assist with Insulin  HM DIABETES FOOT EXAM    POCT glycosylated hemoglobin (Hb A1C)    insulin glargine (BASAGLAR KWIKPEN) 100 UNIT/ML injection pen    Insulin Pen Needle ([employer] PEN NEEDLES) 31G X 6 MM MISC   2. Essential hypertension   BP stable . Continue on current regimen    3. On plavix, statin    4. Mixed hyperlipidemia   Well controlled, continue current meds    5. Screening for AAA (abdominal aortic aneurysm)  US ABDOMINAL AORTA LIMITED   6. Diabetic peripheral neuropathy (HCC)   Will increase Lyrica to 100 mg tid pregabalin (LYRICA) 100 MG capsule                   Completed Refills   Requested Prescriptions     Signed Prescriptions Disp Refills    insulin glargine (BASAGLAR KWIKPEN) 100 UNIT/ML injection pen 5 pen 3     Sig: Inject 10 Units into the skin every morning (before breakfast)    Insulin Pen Needle ([employer] PEN NEEDLES) 31G X 6 MM MISC 100 each 3     Si each by Does not apply route daily    sertraline (ZOLOFT) 50 MG tablet 30 tablet 3     Sig: Take 1 tablet by mouth daily    pregabalin (LYRICA) 100 MG capsule 90 capsule 3     Sig: Take 1 capsule by mouth 3 times daily for 30 days. Return in about 3 months (around 2019) for HTN, diabetes, hyperlipidemia. Orders Placed This Encounter   Medications    insulin glargine (BASAGLAR KWIKPEN) 100 UNIT/ML injection pen     Sig: Inject 10 Units into the skin every morning (before breakfast)     Dispense:  5 pen     Refill:  3    Insulin Pen Needle ([employer] PEN NEEDLES) 31G X 6 MM MISC     Si each by Does not apply route daily     Dispense:  100 each     Refill:  3    sertraline (ZOLOFT) 50 MG tablet     Sig: Take 1 tablet by mouth daily     Dispense:  30 tablet     Refill:  3    pregabalin (LYRICA) 100 MG capsule     Sig: Take 1 capsule by mouth 3 times daily for 30 days.      Dispense:  90 capsule     Refill:  3     Orders Placed This Encounter   Procedures    US ABDOMINAL AORTA LIMITED     Standing Status:   Future     Standing Expiration Date:   2020    POCT glycosylated hemoglobin (Hb A1C)    HM DIABETES FOOT EXAM         Patient Instructions     SURVEY:    You may be receiving a survey from [employer] regarding your visit today. Please complete the survey to enable us to provide the highest quality of care to you and your family. If you cannot score us a very good on any question, please call the office to discuss how we could of made your experience a very good one. Thank you. Electronically signed by Mirella Fried MD on 2019 at 4:04 PM           Completed Refills      Requested Prescriptions     Signed Prescriptions Disp Refills    insulin glargine (BASAGLAR KWIKPEN) 100 UNIT/ML injection pen 5 pen 3     Sig: Inject 10 Units into the skin every morning (before breakfast)    Insulin Pen Needle ([employer] PEN NEEDLES) 31G X 6 MM MISC 100 each 3     Si each by Does not apply route daily    sertraline (ZOLOFT) 50 MG tablet 30 tablet 3     Sig: Take 1 tablet by mouth daily    pregabalin (LYRICA) 100 MG capsule 90 capsule 3     Sig: Take 1 capsule by mouth 3 times daily for 30 days.

## 2019-06-03 ENCOUNTER — TELEPHONE (OUTPATIENT)
Dept: FAMILY MEDICINE CLINIC | Age: 64
End: 2019-06-03

## 2019-06-03 DIAGNOSIS — E11.59 TYPE 2 DIABETES MELLITUS WITH OTHER CIRCULATORY COMPLICATION, UNSPECIFIED WHETHER LONG TERM INSULIN USE (HCC): Primary | ICD-10-CM

## 2019-06-03 RX ORDER — GLUCOSAMINE HCL/CHONDROITIN SU 500-400 MG
CAPSULE ORAL
Qty: 100 STRIP | Refills: 5 | Status: SHIPPED | OUTPATIENT
Start: 2019-06-03 | End: 2019-06-04 | Stop reason: ALTCHOICE

## 2019-06-03 RX ORDER — BLOOD-GLUCOSE METER
1 KIT MISCELLANEOUS DAILY
Qty: 1 KIT | Refills: 0 | Status: SHIPPED | OUTPATIENT
Start: 2019-06-03 | End: 2019-06-04 | Stop reason: ALTCHOICE

## 2019-06-03 NOTE — TELEPHONE ENCOUNTER
Jannis Flavors
Pending all
mellitus with circulatory disorder (HCC)     Closed nondisplaced basicervical fracture of right femur ([address]     Diabetic peripheral neuropathy ([address]     Mixed hyperlipidemia     Gangrene of foot ([address]     Diabetes mellitus with foot ulcer and gangrene ([address]

## 2019-06-04 RX ORDER — BLOOD-GLUCOSE METER
1 KIT MISCELLANEOUS 3 TIMES DAILY
Qty: 1 KIT | Refills: 0 | Status: ON HOLD
Start: 2019-06-04 | End: 2020-08-18 | Stop reason: HOSPADM

## 2019-06-04 RX ORDER — GLUCOSAMINE HCL/CHONDROITIN SU 500-400 MG
CAPSULE ORAL
Qty: 200 STRIP | Refills: 1 | Status: SHIPPED | OUTPATIENT
Start: 2019-06-04 | End: 2020-12-15 | Stop reason: SDUPTHER

## 2019-09-28 DIAGNOSIS — E78.2 MIXED HYPERLIPIDEMIA: ICD-10-CM

## 2019-09-28 DIAGNOSIS — I10 ESSENTIAL HYPERTENSION: ICD-10-CM

## 2019-09-28 DIAGNOSIS — I73.9 PAD (PERIPHERAL ARTERY DISEASE) (HCC): ICD-10-CM

## 2019-09-30 RX ORDER — LOVASTATIN 20 MG/1
TABLET ORAL
Qty: 30 TABLET | Refills: 4 | Status: SHIPPED | OUTPATIENT
Start: 2019-09-30 | End: 2020-02-20 | Stop reason: ALTCHOICE

## 2019-09-30 RX ORDER — AMLODIPINE BESYLATE 10 MG/1
TABLET ORAL
Qty: 30 TABLET | Refills: 4 | Status: SHIPPED | OUTPATIENT
Start: 2019-09-30 | End: 2020-05-04

## 2019-09-30 RX ORDER — CLOPIDOGREL BISULFATE 75 MG/1
TABLET ORAL
Qty: 30 TABLET | Refills: 4 | Status: SHIPPED | OUTPATIENT
Start: 2019-09-30 | End: 2020-10-12

## 2019-09-30 RX ORDER — LISINOPRIL 2.5 MG/1
TABLET ORAL
Qty: 30 TABLET | Refills: 4 | Status: SHIPPED | OUTPATIENT
Start: 2019-09-30 | End: 2020-02-20 | Stop reason: SDUPTHER

## 2019-09-30 NOTE — TELEPHONE ENCOUNTER
Last OV: 4/4/2019   Next scheduled apt: Visit date not found      Medication pending        Health Maintenance   Topic Date Due    AAA screen  1955    Shingles Vaccine (1 of 2) 02/22/2003    Diabetic microalbuminuria test  03/04/2017    Diabetic retinal exam  11/21/2018    Annual Wellness Visit (AWV)  05/29/2019    A1C test (Diabetic or Prediabetic)  07/04/2019    Flu vaccine (1) 09/01/2019    Diabetic foot exam  04/04/2020    Lipid screen  04/04/2020    Potassium monitoring  04/04/2020    Creatinine monitoring  04/04/2020    Pneumococcal 65+ years Vaccine (2 of 2 - PPSV23) 08/19/2020    DTaP/Tdap/Td vaccine (2 - Td) 08/19/2025    Colon cancer screen colonoscopy  11/09/2026    Hepatitis C screen  Completed             (applicable per patient's age: Cancer Screenings, Depression Screening, Fall Risk Screening, Immunizations)    Hemoglobin A1C (%)   Date Value   04/04/2019 9.4   09/21/2018 7.2   03/05/2018 6.6     Microalb/Crt. Ratio (mcg/mg creat)   Date Value   03/04/2016 17 (H)     LDL Cholesterol (mg/dL)   Date Value   04/04/2019 54     AST (U/L)   Date Value   03/22/2018     DISREGARD RESULTS. TEST WAS ORDERED ON INCORRECT PATIENT.   03/22/2018 9     ALT (U/L)   Date Value   03/22/2018     DISREGARD RESULTS. TEST WAS ORDERED ON INCORRECT PATIENT.   03/22/2018 7     BUN (mg/dL)   Date Value   04/04/2019 30 (H)      (goal A1C is < 7)   (goal LDL is <100) need 30-50% reduction from baseline     BP Readings from Last 3 Encounters:   04/04/19 130/62   01/04/19 114/70   09/21/18 110/60    (goal /80)      All Future Testing planned in [facility]:  Lab Frequency Next Occurrence   US ABDOMINAL AORTA LIMITED Once 04/04/2019       Next Visit Date:  No future appointments.          Patient Active Problem List:     Deafness     PAD (peripheral artery disease) ([facility] [address]     Hypertension     Ulcer of right leg ([facility] [address]     Type 2 diabetes mellitus with circulatory disorder (HCC)     Closed nondisplaced basicervical fracture of right femur ([facility] [address]     Diabetic peripheral neuropathy (HCC)     Mixed hyperlipidemia     Gangrene of foot ([address]     Diabetes mellitus with foot ulcer and gangrene ([address]

## 2019-10-01 ENCOUNTER — TELEPHONE (OUTPATIENT)
Dept: FAMILY MEDICINE CLINIC | Age: 64
End: 2019-10-01

## 2020-02-20 ENCOUNTER — OFFICE VISIT (OUTPATIENT)
Dept: FAMILY MEDICINE CLINIC | Age: 65
End: 2020-02-20
Payer: MEDICARE

## 2020-02-20 VITALS
DIASTOLIC BLOOD PRESSURE: 70 MMHG | WEIGHT: 178 LBS | BODY MASS INDEX: 24.83 KG/M2 | HEART RATE: 76 BPM | OXYGEN SATURATION: 98 % | SYSTOLIC BLOOD PRESSURE: 138 MMHG

## 2020-02-20 PROBLEM — I65.23 BILATERAL CAROTID ARTERY OCCLUSION: Status: ACTIVE | Noted: 2020-02-20

## 2020-02-20 LAB — HBA1C MFR BLD: 12.4 %

## 2020-02-20 PROCEDURE — 1036F TOBACCO NON-USER: CPT | Performed by: INTERNAL MEDICINE

## 2020-02-20 PROCEDURE — 99214 OFFICE O/P EST MOD 30 MIN: CPT | Performed by: INTERNAL MEDICINE

## 2020-02-20 PROCEDURE — G8420 CALC BMI NORM PARAMETERS: HCPCS | Performed by: INTERNAL MEDICINE

## 2020-02-20 PROCEDURE — 1123F ACP DISCUSS/DSCN MKR DOCD: CPT | Performed by: INTERNAL MEDICINE

## 2020-02-20 PROCEDURE — 4040F PNEUMOC VAC/ADMIN/RCVD: CPT | Performed by: INTERNAL MEDICINE

## 2020-02-20 PROCEDURE — 83036 HEMOGLOBIN GLYCOSYLATED A1C: CPT | Performed by: INTERNAL MEDICINE

## 2020-02-20 PROCEDURE — 3017F COLORECTAL CA SCREEN DOC REV: CPT | Performed by: INTERNAL MEDICINE

## 2020-02-20 PROCEDURE — 3046F HEMOGLOBIN A1C LEVEL >9.0%: CPT | Performed by: INTERNAL MEDICINE

## 2020-02-20 PROCEDURE — G8427 DOCREV CUR MEDS BY ELIG CLIN: HCPCS | Performed by: INTERNAL MEDICINE

## 2020-02-20 PROCEDURE — G8484 FLU IMMUNIZE NO ADMIN: HCPCS | Performed by: INTERNAL MEDICINE

## 2020-02-20 PROCEDURE — 2022F DILAT RTA XM EVC RTNOPTHY: CPT | Performed by: INTERNAL MEDICINE

## 2020-02-20 RX ORDER — GLIPIZIDE 10 MG/1
10 TABLET, FILM COATED, EXTENDED RELEASE ORAL DAILY
Qty: 30 TABLET | Refills: 5 | Status: ON HOLD | OUTPATIENT
Start: 2020-02-20 | End: 2020-08-18 | Stop reason: HOSPADM

## 2020-02-20 RX ORDER — LOVASTATIN 20 MG/1
TABLET ORAL
Qty: 30 TABLET | Refills: 5 | Status: SHIPPED | OUTPATIENT
Start: 2020-02-20 | End: 2020-07-30 | Stop reason: SDUPTHER

## 2020-02-20 RX ORDER — CLOPIDOGREL BISULFATE 75 MG/1
TABLET ORAL
Qty: 30 TABLET | Refills: 5 | Status: ON HOLD | OUTPATIENT
Start: 2020-02-20 | End: 2020-08-18

## 2020-02-20 RX ORDER — LISINOPRIL 2.5 MG/1
TABLET ORAL
Qty: 30 TABLET | Refills: 4 | Status: SHIPPED | OUTPATIENT
Start: 2020-02-20 | End: 2020-07-30 | Stop reason: SDUPTHER

## 2020-02-20 RX ORDER — FENOFIBRATE 160 MG/1
160 TABLET ORAL DAILY
Qty: 30 TABLET | Refills: 3 | Status: SHIPPED | OUTPATIENT
Start: 2020-02-20 | End: 2020-07-06

## 2020-02-20 ASSESSMENT — ENCOUNTER SYMPTOMS
SORE THROAT: 0
COUGH: 0
NAUSEA: 0
SHORTNESS OF BREATH: 0
CONSTIPATION: 0
BLOOD IN STOOL: 0
ALLERGIC/IMMUNOLOGIC NEGATIVE: 1
WHEEZING: 0
ABDOMINAL PAIN: 0

## 2020-02-20 ASSESSMENT — PATIENT HEALTH QUESTIONNAIRE - PHQ9
SUM OF ALL RESPONSES TO PHQ QUESTIONS 1-9: 0
SUM OF ALL RESPONSES TO PHQ9 QUESTIONS 1 & 2: 0
2. FEELING DOWN, DEPRESSED OR HOPELESS: 0
SUM OF ALL RESPONSES TO PHQ QUESTIONS 1-9: 0
1. LITTLE INTEREST OR PLEASURE IN DOING THINGS: 0

## 2020-02-20 NOTE — PROGRESS NOTES
HPI Notes    Name: Kristina Nunez  : 1955         Chief Complaint:     Chief Complaint   Patient presents with    Hyperlipidemia    Hypertension     Has been having alot of headaches    Diabetes     on [facility] [address] gap. Last A1C was 9.4 on 19, states that he cant seem to get his sugar down. is due for eye exam in may    Insomnia     Has been having trouble sleeping at night. History of Present Illness:        Mr. Chava Blackwood presents to office to follow up for DM, HTN, Hyperlipidemia. Also c/o trouble sleeping for \" a long time\". States has difficulty falling asleep and wakes up frequently throughout the nighttime. Believes that sleeps only 2-3 hours. Does not drink coffee. Did not try any OTC sleep meds. Last HbA1C was 9.4% on 19. He has not followed up with our office since . Noncompliant with meds. Unable to tell me what he takes for diabetes and other chronic conditions. Does not have meds with him. States needs refill on meds. Diabetes   He presents for his follow-up diabetic visit. He has type 2 diabetes mellitus. There are no hypoglycemic associated symptoms. Pertinent negatives for hypoglycemia include no dizziness, headaches or nervousness/anxiousness. Associated symptoms include foot paresthesias. Pertinent negatives for diabetes include no blurred vision, no chest pain, no fatigue, no polydipsia, no polyuria and no weakness. Symptoms are worsening. Diabetic complications include peripheral neuropathy and PVD. Pertinent negatives for diabetic complications include no heart disease. Risk factors for coronary artery disease include diabetes mellitus, dyslipidemia, male sex, hypertension and tobacco exposure. Current diabetic treatment includes insulin injections and oral agent (dual therapy) (not sure if takes meds at home). He is compliant with treatment some of the time. Home blood sugar record trend: does not check glucose levesl at home.  An ACE inhibitor/angiotensin II receptor blocker is being taken. He sees a podiatrist.Eye exam is not current. Hypertension   This is a chronic problem. The current episode started today. The problem is unchanged. The problem is controlled. Pertinent negatives include no blurred vision, chest pain, headaches, palpitations or shortness of breath. There are no associated agents to hypertension. Past treatments include ACE inhibitors, beta blockers and calcium channel blockers. The current treatment provides significant improvement. Hypertensive end-organ damage includes PVD. Hyperlipidemia   This is a chronic problem. The current episode started more than 1 year ago. The problem is controlled. Recent lipid tests were reviewed and are variable. Exacerbating diseases include diabetes. Pertinent negatives include no chest pain or shortness of breath. Current antihyperlipidemic treatment includes statins and fibric acid derivatives. The current treatment provides significant improvement of lipids.            Past Medical History:     Past Medical History:   Diagnosis Date    Deafness 9/16/2011    Diabetes mellitus ([facility] [address] 9/16/2011    Dyslipidemia 9/16/2011    Hypertension 9/16/2011    MRSA infection 03/23/2018    foot and blood    PAD (peripheral artery disease) ([facility] [address] 9/16/2011    Sinusitis, chronic 9/16/2011    Type II or unspecified type diabetes mellitus without mention of complication, not stated as uncontrolled       Reviewed all health maintenance requirements and orderedappropriate tests  Health Maintenance Due   Topic Date Due    AAA screen  1955    Hepatitis B vaccine (1 of 3 - Risk 3-dose series) 02/22/1972    Shingles Vaccine (1 of 2) 02/22/2003    Diabetic microalbuminuria test  03/04/2017    Diabetic retinal exam  11/21/2018    Annual Wellness Visit (AWV)  05/29/2019    Flu vaccine (1) 09/01/2019       Past Surgical History:     Past Surgical History:   Procedure Laterality Date    HERNIA REPAIR  11-98    INTERTROCHANTERIC HIP FRACTURE SURGERY Right     TOE AMPUTATION Right     All toes on right foot, 4th toe on left foot    VASCULAR SURGERY  10/2015    Right Carotid         Medications:       Prior to Admission medications    Medication Sig Start Date End Date Taking? Authorizing Provider   clopidogrel (PLAVIX) 75 MG tablet TAKE 1 TABLET BY MOUTH DAILY FOR 30DAYS 2/20/20  Yes Porsha Rodriguez MD   lovastatin (MEVACOR) 20 MG tablet TAKE 1 TABLET BY MOUTH EVERY DAY 2/20/20  Yes Porsha Rodriguez MD   metFORMIN (GLUCOPHAGE) 1000 MG tablet TAKE ONE TABLET BY MOUTH TWICE A DAY WITH MEALS 2/20/20  Yes Porsha Rodriguez MD   lisinopril (PRINIVIL;ZESTRIL) 2.5 MG tablet TAKE ONE TABLET BY MOUTH DAILY 2/20/20  Yes Porsha Rodriguez MD   insulin glargine (BASAGLAR KWIKPEN) 100 UNIT/ML injection pen Inject 15 Units into the skin 2 times daily 2/20/20  Yes Porsha Rodriguez MD   fenofibrate 160 MG tablet Take 1 tablet by mouth daily Prescribed by [facility] 2/20/20  Yes Porsha Rodriguez MD   glipiZIDE (GLUCOTROL XL) 10 MG extended release tablet Take 1 tablet by mouth daily 2/20/20  Yes Porsha Rodriguez MD   glucose monitoring kit (FREESTYLE) monitoring kit 1 kit by Does not apply route 3 times daily And as needed 6/4/19  Yes Porsha Rodriguez MD   blood glucose monitor strips Test 3 times a day & as needed for symptoms of irregular blood glucose. 6/4/19  Yes Porsha Rodriguez MD   Insulin Pen Needle ([employer] PEN NEEDLES) 31G X 6 MM MISC 1 each by Does not apply route daily 4/4/19  Yes Porsha Rodriguez MD   aspirin 81 MG tablet Take 81 mg by mouth daily.    Yes Historical Provider, MD   clopidogrel (PLAVIX) 75 MG tablet TAKE ONE TABLET BY MOUTH DAILY 9/30/19   Porsha Rodriguez MD   metoprolol tartrate (LOPRESSOR) 25 MG tablet TAKE ONE-HALF TABLET BY MOUTH TWICE A DAY 9/30/19   Porsha Rodriguez MD   sertraline (ZOLOFT) 50 MG tablet TAKE ONE TABLET BY MOUTH DAILY 9/30/19   Porsha Rodriguez MD   amLODIPine (NORVASC) 10 MG tablet TAKE ONE TABLET BY MOUTH DAILY 9/30/19   Pastora Smith MD   amLODIPine (NORVASC) 10 MG tablet TAKE 1 TABLET BY MOUTH EVERY DAY  Patient not taking: Reported on 2/20/2020 1/29/19   Pastora Smith MD   sertraline (ZOLOFT) 25 MG tablet TAKE 1 TABLET BY MOUTH EVERY DAY  Patient not taking: Reported on 2/20/2020 1/23/19   Pastora Smith MD   glipiZIDE (GLUCOTROL XL) 10 MG extended release tablet TAKE 1 TABLET BY MOUTH DAILY  Patient not taking: Reported on 2/20/2020 1/4/19   Pastora Smith MD   cloNIDine (CATAPRES) 0.1 MG tablet Take 1 tablet by mouth daily  Patient not taking: Reported on 2/20/2020 1/4/19   Pastora Smith MD   ferrous sulfate 325 (65 Fe) MG tablet Take 1 tablet by mouth 2 times daily  Patient not taking: Reported on 2/20/2020 1/4/19   Pastora Smith MD   cetirizine (ZYRTEC ALLERGY) 10 MG tablet Take 1 tablet by mouth daily  Patient not taking: Reported on 2/20/2020 1/4/19   Pastora Smith MD   docusate (COLACE, DULCOLAX) 100 MG CAPS Take 100 mg by mouth 2 times daily  Patient not taking: Reported on 2/20/2020 1/4/19   Pastora Smith MD   zinc sulfate (ZINC-220) 220 (50 Zn) MG capsule Take 1 capsule by mouth daily  Patient not taking: Reported on 2/20/2020 1/4/19   Pastora Smith MD   gabapentin (NEURONTIN) 400 MG capsule Take 1 capsule by mouth 4 times daily for 30 days. . 7/20/18 4/4/19  Pastora Smith MD        Allergies:       Patient has no known allergies. Social History:     Tobacco: reports that he has quit smoking. His smoking use included cigarettes. He has a 15.00 pack-year smoking history. He has never used smokeless tobacco.  Alcohol:      reports no history of alcohol use. Drug Use:  has no history on file for drug. Family History:     Family History   Problem Relation Age of Onset    Diabetes Father        Review of Systems:         Review of Systems   Constitutional: Negative for activity change, appetite change, fatigue and unexpected weight change. visit.     Electronically signed by Ernst Galvez MD on 2/24/2020 at 9:07 AM           Completed Refills      Requested Prescriptions     Signed Prescriptions Disp Refills    clopidogrel (PLAVIX) 75 MG tablet 30 tablet 5     Sig: TAKE 1 TABLET BY MOUTH DAILY FOR 30DAYS    lovastatin (MEVACOR) 20 MG tablet 30 tablet 5     Sig: TAKE 1 TABLET BY MOUTH EVERY DAY    metFORMIN (GLUCOPHAGE) 1000 MG tablet 60 tablet 2     Sig: TAKE ONE TABLET BY MOUTH TWICE A DAY WITH MEALS    lisinopril (PRINIVIL;ZESTRIL) 2.5 MG tablet 30 tablet 4     Sig: TAKE ONE TABLET BY MOUTH DAILY    insulin glargine (BASAGLAR KWIKPEN) 100 UNIT/ML injection pen 5 pen 3     Sig: Inject 15 Units into the skin 2 times daily    fenofibrate 160 MG tablet 30 tablet 3     Sig: Take 1 tablet by mouth daily Prescribed by [facility] [place]    glipiZIDE (GLUCOTROL XL) 10 MG extended release tablet 30 tablet 5     Sig: Take 1 tablet by mouth daily

## 2020-02-24 PROBLEM — M86.9: Status: ACTIVE | Noted: 2020-02-24

## 2020-02-24 ASSESSMENT — ENCOUNTER SYMPTOMS: BLURRED VISION: 0

## 2020-02-27 ENCOUNTER — TELEPHONE (OUTPATIENT)
Dept: FAMILY MEDICINE CLINIC | Age: 65
End: 2020-02-27

## 2020-02-27 RX ORDER — PREGABALIN 75 MG/1
75 CAPSULE ORAL 3 TIMES DAILY
Qty: 90 CAPSULE | Refills: 3 | Status: SHIPPED | OUTPATIENT
Start: 2020-02-27 | End: 2020-04-27 | Stop reason: DRUGHIGH

## 2020-04-06 RX ORDER — LISINOPRIL 2.5 MG/1
TABLET ORAL
Qty: 60 TABLET | Refills: 3 | OUTPATIENT
Start: 2020-04-06

## 2020-04-06 RX ORDER — LOVASTATIN 20 MG/1
TABLET ORAL
Qty: 60 TABLET | Refills: 3 | OUTPATIENT
Start: 2020-04-06

## 2020-04-06 RX ORDER — CLOPIDOGREL BISULFATE 75 MG/1
TABLET ORAL
Qty: 60 TABLET | Refills: 3 | OUTPATIENT
Start: 2020-04-06

## 2020-04-06 RX ORDER — AMLODIPINE BESYLATE 10 MG/1
TABLET ORAL
Qty: 60 TABLET | Refills: 3 | OUTPATIENT
Start: 2020-04-06

## 2020-04-14 ENCOUNTER — TELEPHONE (OUTPATIENT)
Dept: PRIMARY CARE CLINIC | Age: 65
End: 2020-04-14

## 2020-04-27 RX ORDER — PREGABALIN 150 MG/1
150 CAPSULE ORAL 3 TIMES DAILY
Qty: 90 CAPSULE | Refills: 0 | Status: SHIPPED
Start: 2020-04-27 | End: 2020-05-05 | Stop reason: SINTOL

## 2020-05-04 RX ORDER — AMLODIPINE BESYLATE 10 MG/1
TABLET ORAL
Qty: 60 TABLET | Refills: 3 | Status: ON HOLD
Start: 2020-05-04 | End: 2020-08-18 | Stop reason: HOSPADM

## 2020-05-05 ENCOUNTER — TELEPHONE (OUTPATIENT)
Dept: FAMILY MEDICINE CLINIC | Age: 65
End: 2020-05-05

## 2020-05-05 RX ORDER — GLUCOSAMINE HCL/CHONDROITIN SU 500-400 MG
CAPSULE ORAL
Qty: 300 STRIP | Refills: 3 | Status: SHIPPED | OUTPATIENT
Start: 2020-05-05 | End: 2020-05-12 | Stop reason: SDUPTHER

## 2020-05-05 RX ORDER — PREGABALIN 100 MG/1
100 CAPSULE ORAL 3 TIMES DAILY
Qty: 90 CAPSULE | Refills: 1 | Status: SHIPPED | OUTPATIENT
Start: 2020-05-05 | End: 2020-12-15 | Stop reason: SDUPTHER

## 2020-05-05 RX ORDER — LANCETS 30 GAUGE
1 EACH MISCELLANEOUS 4 TIMES DAILY
Qty: 300 EACH | Refills: 3 | Status: SHIPPED | OUTPATIENT
Start: 2020-05-05 | End: 2020-05-12 | Stop reason: SDUPTHER

## 2020-05-12 RX ORDER — LANCETS 30 GAUGE
1 EACH MISCELLANEOUS 4 TIMES DAILY
Qty: 300 EACH | Refills: 3 | Status: SHIPPED | OUTPATIENT
Start: 2020-05-12

## 2020-05-12 RX ORDER — GLUCOSAMINE HCL/CHONDROITIN SU 500-400 MG
CAPSULE ORAL
Qty: 300 STRIP | Refills: 3 | Status: ON HOLD
Start: 2020-05-12 | End: 2020-08-18 | Stop reason: HOSPADM

## 2020-05-12 NOTE — TELEPHONE ENCOUNTER
([facility] [address]     Type 2 diabetes mellitus with circulatory disorder ([address]     Closed nondisplaced basicervical fracture of right femur (HCC)     Diabetic peripheral neuropathy ([address]     Mixed hyperlipidemia     Gangrene of foot ([address]     Diabetes mellitus with foot ulcer and gangrene (HCC)     Benign prostatic hyperplasia     Bilateral carotid artery occlusion     Osteomyelitis, multiple sites ([address]

## 2020-05-22 ENCOUNTER — OFFICE VISIT (OUTPATIENT)
Dept: FAMILY MEDICINE CLINIC | Age: 65
End: 2020-05-22
Payer: MEDICARE

## 2020-05-22 ENCOUNTER — HOSPITAL ENCOUNTER (OUTPATIENT)
Age: 65
Discharge: HOME | End: 2020-05-22
Payer: MEDICARE

## 2020-05-22 ENCOUNTER — TELEPHONE (OUTPATIENT)
Dept: FAMILY MEDICINE CLINIC | Age: 65
End: 2020-05-22

## 2020-05-22 VITALS
SYSTOLIC BLOOD PRESSURE: 122 MMHG | WEIGHT: 206 LBS | HEART RATE: 67 BPM | RESPIRATION RATE: 20 BRPM | BODY MASS INDEX: 28.84 KG/M2 | OXYGEN SATURATION: 97 % | DIASTOLIC BLOOD PRESSURE: 84 MMHG | HEIGHT: 71 IN

## 2020-05-22 PROBLEM — E11.52 DIABETES MELLITUS WITH FOOT ULCER AND GANGRENE (HCC): Status: RESOLVED | Noted: 2018-03-22 | Resolved: 2020-05-22

## 2020-05-22 PROBLEM — E11.621 DIABETES MELLITUS WITH FOOT ULCER AND GANGRENE (HCC): Status: RESOLVED | Noted: 2018-03-22 | Resolved: 2020-05-22

## 2020-05-22 PROBLEM — L97.509 DIABETES MELLITUS WITH FOOT ULCER AND GANGRENE (HCC): Status: RESOLVED | Noted: 2018-03-22 | Resolved: 2020-05-22

## 2020-05-22 LAB
ABSOLUTE EOS #: 0.3 K/UL (ref 0–0.4)
ABSOLUTE IMMATURE GRANULOCYTE: ABNORMAL K/UL (ref 0–0.3)
ABSOLUTE LYMPH #: 2 K/UL (ref 1–4.8)
ABSOLUTE MONO #: 0.7 K/UL (ref 0–1)
ANION GAP SERPL CALCULATED.3IONS-SCNC: 11 MMOL/L (ref 9–17)
BASOPHILS # BLD: 1 % (ref 0–2)
BASOPHILS ABSOLUTE: 0 K/UL (ref 0–0.2)
BUN BLDV-MCNC: 36 MG/DL (ref 8–23)
BUN/CREAT BLD: 31 (ref 9–20)
CALCIUM SERPL-MCNC: 10.3 MG/DL (ref 8.6–10.4)
CHLORIDE BLD-SCNC: 102 MMOL/L (ref 98–107)
CO2: 23 MMOL/L (ref 20–31)
CREAT SERPL-MCNC: 1.15 MG/DL (ref 0.7–1.2)
DIFFERENTIAL TYPE: YES
EOSINOPHILS RELATIVE PERCENT: 3 % (ref 0–5)
GFR AFRICAN AMERICAN: >60 ML/MIN
GFR NON-AFRICAN AMERICAN: >60 ML/MIN
GFR SERPL CREATININE-BSD FRML MDRD: ABNORMAL ML/MIN/{1.73_M2}
GFR SERPL CREATININE-BSD FRML MDRD: ABNORMAL ML/MIN/{1.73_M2}
GLUCOSE BLD-MCNC: 168 MG/DL (ref 70–99)
HBA1C MFR BLD: 7.8 %
HCT VFR BLD CALC: 37 % (ref 41–53)
HEMOGLOBIN: 12.2 G/DL (ref 13.5–17.5)
IMMATURE GRANULOCYTES: ABNORMAL %
LYMPHOCYTES # BLD: 22 % (ref 13–44)
MCH RBC QN AUTO: 29.9 PG (ref 26–34)
MCHC RBC AUTO-ENTMCNC: 33 G/DL (ref 31–37)
MCV RBC AUTO: 90.5 FL (ref 80–100)
MONOCYTES # BLD: 8 % (ref 5–9)
NRBC AUTOMATED: ABNORMAL PER 100 WBC
PDW BLD-RTO: 14.8 % (ref 12.1–15.2)
PLATELET # BLD: 257 K/UL (ref 140–450)
PLATELET ESTIMATE: ABNORMAL
PMV BLD AUTO: ABNORMAL FL (ref 6–12)
POTASSIUM SERPL-SCNC: 5.3 MMOL/L (ref 3.7–5.3)
RBC # BLD: 4.09 M/UL (ref 4.5–5.9)
RBC # BLD: ABNORMAL 10*6/UL
SEG NEUTROPHILS: 66 % (ref 39–75)
SEGMENTED NEUTROPHILS ABSOLUTE COUNT: 6.1 K/UL (ref 2.1–6.5)
SODIUM BLD-SCNC: 136 MMOL/L (ref 135–144)
WBC # BLD: 9.1 K/UL (ref 3.5–11)
WBC # BLD: ABNORMAL 10*3/UL

## 2020-05-22 PROCEDURE — 80048 BASIC METABOLIC PNL TOTAL CA: CPT

## 2020-05-22 PROCEDURE — 2022F DILAT RTA XM EVC RTNOPTHY: CPT | Performed by: INTERNAL MEDICINE

## 2020-05-22 PROCEDURE — 99213 OFFICE O/P EST LOW 20 MIN: CPT | Performed by: INTERNAL MEDICINE

## 2020-05-22 PROCEDURE — 3051F HG A1C>EQUAL 7.0%<8.0%: CPT | Performed by: INTERNAL MEDICINE

## 2020-05-22 PROCEDURE — G8417 CALC BMI ABV UP PARAM F/U: HCPCS | Performed by: INTERNAL MEDICINE

## 2020-05-22 PROCEDURE — 3017F COLORECTAL CA SCREEN DOC REV: CPT | Performed by: INTERNAL MEDICINE

## 2020-05-22 PROCEDURE — 1123F ACP DISCUSS/DSCN MKR DOCD: CPT | Performed by: INTERNAL MEDICINE

## 2020-05-22 PROCEDURE — 85025 COMPLETE CBC W/AUTO DIFF WBC: CPT

## 2020-05-22 PROCEDURE — G8428 CUR MEDS NOT DOCUMENT: HCPCS | Performed by: INTERNAL MEDICINE

## 2020-05-22 PROCEDURE — 36415 COLL VENOUS BLD VENIPUNCTURE: CPT

## 2020-05-22 PROCEDURE — 4040F PNEUMOC VAC/ADMIN/RCVD: CPT | Performed by: INTERNAL MEDICINE

## 2020-05-22 PROCEDURE — 83036 HEMOGLOBIN GLYCOSYLATED A1C: CPT | Performed by: INTERNAL MEDICINE

## 2020-05-22 PROCEDURE — 1036F TOBACCO NON-USER: CPT | Performed by: INTERNAL MEDICINE

## 2020-05-22 RX ORDER — DOXYCYCLINE HYCLATE 100 MG/1
100 CAPSULE ORAL 2 TIMES DAILY
Qty: 14 CAPSULE | Refills: 0 | Status: SHIPPED | OUTPATIENT
Start: 2020-05-22 | End: 2020-05-29

## 2020-05-22 RX ORDER — FUROSEMIDE 20 MG/1
20 TABLET ORAL DAILY
Qty: 10 TABLET | Refills: 3 | Status: ON HOLD | OUTPATIENT
Start: 2020-05-22 | End: 2020-08-18 | Stop reason: HOSPADM

## 2020-05-22 RX ORDER — TRAMADOL HYDROCHLORIDE 50 MG/1
50 TABLET ORAL EVERY 6 HOURS PRN
Qty: 12 TABLET | Refills: 0 | Status: SHIPPED | OUTPATIENT
Start: 2020-05-22 | End: 2020-05-25

## 2020-05-22 ASSESSMENT — ENCOUNTER SYMPTOMS
ABDOMINAL PAIN: 0
SHORTNESS OF BREATH: 0
CONSTIPATION: 0
COUGH: 0
ALLERGIC/IMMUNOLOGIC NEGATIVE: 1
WHEEZING: 0
CHEST TIGHTNESS: 0
VOMITING: 0
BLOOD IN STOOL: 0
SORE THROAT: 0
NAUSEA: 0

## 2020-05-22 NOTE — PROGRESS NOTES
HPI Notes    Name: Alisa Yu  : 1955         Chief Complaint:     Chief Complaint   Patient presents with    Swelling     leg swelling. x 1 month       History of Present Illness:        Kishore Israel presents to office for evaluation of swelling in lower extremities  He is accompanied by his sister. He believes that he developed swelling gradually over the past 1 month. He also reports increased pain in lower extremities. He has severe PAD. Kishore Israel states that the pain in his lower extremities is constant, severe, throbbing, burning. He is unable to sleep, he feels stressed and frustrated. He is taking Lyrica, Tylenol and it is not helping. Lyrica dose was recently increased. He also tried ibuprofen, had no alleviation of the pain. He used to follow-up with Dr. Caridad Zarate in [place] for severe PVD. He had multiple interventions due to severe PAD. On 18 R CFA to PT bypass with GSV, anatomically tunneled      his sister states that he has not followed with his vascular surgeon for more than a year. Kishore Israel unfortunately continues to smoke.         Past Medical History:     Past Medical History:   Diagnosis Date    Deafness 2011    Diabetes mellitus ([facility] [address] 2011    Dyslipidemia 2011    Hypertension 2011    MRSA infection 2018    foot and blood    PAD (peripheral artery disease) ([facility] [address] 2011    Sinusitis, chronic 2011    Type II or unspecified type diabetes mellitus without mention of complication, not stated as uncontrolled       Reviewed all health maintenance requirements and orderedappropriate tests  Health Maintenance Due   Topic Date Due    Annual Wellness Visit (AWV)  2019    Diabetic foot exam  2020    Lipid screen  2020    Potassium monitoring  2020    Creatinine monitoring  2020       Past Surgical History:     Past Surgical History:   Procedure Laterality Date    HERNIA REPAIR      INTERTROCHANTERIC HIP FRACTURE SURGERY Right     TOE AMPUTATION Right     All toes on right foot, 4th toe on left foot    VASCULAR SURGERY  10/2015    Right Carotid         Medications:       Prior to Admission medications    Medication Sig Start Date End Date Taking? Authorizing Provider   doxycycline hyclate (VIBRAMYCIN) 100 MG capsule Take 1 capsule by mouth 2 times daily for 7 days 5/22/20 5/29/20 Yes Raymond Rueda MD   furosemide (LASIX) 20 MG tablet Take 1 tablet by mouth daily for 10 days 5/22/20 6/1/20 Yes Raymond Rueda MD   traMADol (ULTRAM) 50 MG tablet Take 1 tablet by mouth every 6 hours as needed for Pain for up to 3 days. 1 or 2 tabs every 6 hours as needed for pain. 5/22/20 5/25/20 Yes Raymond Rueda MD   blood glucose monitor kit and supplies Test 4-5 times a day & as needed for symptoms of irregular blood glucose. Please dispense supplies that are covered under pt's insurance 5/12/20  Yes Raymond Rueda MD   blood glucose monitor strips Test 4-5 times a day & as needed for symptoms of irregular blood glucose. 5/12/20  Yes Raymond Rueda MD   Lancets MISC 1 each by Does not apply route 4 times daily 5/12/20  Yes Raymond Rueda MD   pregabalin (LYRICA) 100 MG capsule Take 1 capsule by mouth 3 times daily for 30 days.  5/5/20 6/4/20 Yes Raymond Rueda MD   amLODIPine (NORVASC) 10 MG tablet TAKE ONE TABLET BY MOUTH DAILY 5/4/20  Yes Raymond Rueda MD   clopidogrel (PLAVIX) 75 MG tablet TAKE 1 TABLET BY MOUTH DAILY FOR 30DAYS 2/20/20  Yes Raymond Rueda MD   lovastatin (MEVACOR) 20 MG tablet TAKE 1 TABLET BY MOUTH EVERY DAY 2/20/20  Yes Raymond Rueda MD   metFORMIN (GLUCOPHAGE) 1000 MG tablet TAKE ONE TABLET BY MOUTH TWICE A DAY WITH MEALS 2/20/20  Yes Raymond Rueda MD   lisinopril (PRINIVIL;ZESTRIL) 2.5 MG tablet TAKE ONE TABLET BY MOUTH DAILY 2/20/20  Yes Raymond Rueda MD   insulin glargine (BASAGLAR KWIKPEN) 100 UNIT/ML injection pen Inject 15 Units into the skin 2 times daily 2/20/20  Yes taking: Reported on 5/22/2020 1/4/19   Corrie Bustos MD        Allergies:       Patient has no known allergies. Social History:     Tobacco: reports that he has quit smoking. His smoking use included cigarettes. He has a 15.00 pack-year smoking history. He has never used smokeless tobacco.  Alcohol:      reports no history of alcohol use. Drug Use:  has no history on file for drug. Family History:     Family History   Problem Relation Age of Onset    Diabetes Father        Review of Systems:         Review of Systems   Constitutional: Negative for activity change, appetite change, chills, fatigue, fever and unexpected weight change. HENT: Negative for congestion, ear discharge, ear pain and sore throat. Eyes: Negative for visual disturbance. Respiratory: Negative for cough, chest tightness, shortness of breath and wheezing. Cardiovascular: Positive for leg swelling. Negative for chest pain and palpitations. Gastrointestinal: Negative for abdominal pain, blood in stool, constipation, nausea and vomiting. Endocrine: Negative for cold intolerance, heat intolerance, polydipsia and polyuria. Genitourinary: Negative for difficulty urinating and dysuria. Musculoskeletal: Positive for myalgias. Skin: Positive for rash. Allergic/Immunologic: Negative. Negative for environmental allergies. Neurological: Negative for dizziness, tremors, weakness and headaches. Psychiatric/Behavioral: Negative for behavioral problems and dysphoric mood. The patient is not nervous/anxious. Physical Exam:     Vitals:  /84   Pulse 67   Resp 20   Ht 5' 11\" (1.803 m)   Wt 206 lb (93.4 kg)   SpO2 97%   BMI 28.73 kg/m²       Physical Exam  Vitals signs reviewed. Constitutional:       General: He is not in acute distress. Appearance: He is well-developed. HENT:      Head: Normocephalic and atraumatic.       Right Ear: External ear normal.      Left Ear: External ear normal.      Nose: Nose normal.      Mouth/Throat:      Mouth: Mucous membranes are moist.      Pharynx: Oropharynx is clear. Eyes:      General: No scleral icterus. Right eye: No discharge. Left eye: No discharge. Conjunctiva/sclera: Conjunctivae normal.   Neck:      Thyroid: No thyromegaly. Cardiovascular:      Rate and Rhythm: Normal rate and regular rhythm. Heart sounds: Normal heart sounds. No murmur. Pulmonary:      Effort: Pulmonary effort is normal.      Breath sounds: Normal breath sounds. No wheezing or rales. Abdominal:      General: Bowel sounds are normal. There is no distension. Palpations: Abdomen is soft. There is no mass. Tenderness: There is no abdominal tenderness. Musculoskeletal: Normal range of motion. Right lower leg: Edema present. Left lower leg: Edema present. Comments: Partial amputation of the right foot. Left fourth toe amputated   Lymphadenopathy:      Cervical: No cervical adenopathy. Skin:     General: Skin is warm and dry. Coloration: Skin is not pale. Findings: No rash. Comments: Diffuse rash present on both lower extremities extending from feet to below the knee area. Multiple scars from previous lesions. No open wounds, no draining wounds noted. Legs warm to touch, tender   Neurological:      General: No focal deficit present. Mental Status: He is alert and oriented to person, place, and time. Coordination: Coordination abnormal.      Gait: Gait abnormal.   Psychiatric:         Mood and Affect: Mood normal.         Behavior: Behavior normal.         Judgment: Judgment normal.               Data:     Lab Results   Component Value Date     04/04/2019    K 4.8 04/04/2019     04/04/2019    CO2 22 04/04/2019    BUN 30 04/04/2019    CREATININE 0.67 04/04/2019    GLUCOSE 299 04/04/2019    PROT  03/22/2018     DISREGARD RESULTS. TEST WAS ORDERED ON INCORRECT PATIENT.     PROT 7.1 03/22/2018    LABALBU 03/22/2018     DISREGARD RESULTS. TEST WAS ORDERED ON INCORRECT PATIENT. LABALBU 3.8 03/22/2018    BILITOT  03/22/2018     DISREGARD RESULTS. TEST WAS ORDERED ON INCORRECT PATIENT. BILITOT 0.25 03/22/2018    ALKPHOS  03/22/2018     DISREGARD RESULTS. TEST WAS ORDERED ON INCORRECT PATIENT. ALKPHOS 89 03/22/2018    AST  03/22/2018     DISREGARD RESULTS. TEST WAS ORDERED ON INCORRECT PATIENT. AST 9 03/22/2018    ALT  03/22/2018     DISREGARD RESULTS. TEST WAS ORDERED ON INCORRECT PATIENT. ALT 7 03/22/2018     Lab Results   Component Value Date    WBC 9.4 04/04/2019    RBC 4.32 04/04/2019    HGB 12.7 04/04/2019    HCT 39.2 04/04/2019    MCV 90.8 04/04/2019    MCH 29.5 04/04/2019    MCHC 32.5 04/04/2019    RDW 15.3 04/04/2019     04/04/2019    MPV NOT REPORTED 04/04/2019     No results found for: TSH  Lab Results   Component Value Date    CHOL 141 04/04/2019    HDL 43 04/04/2019    LABA1C 7.8 05/22/2020          Assessment & Plan        Diagnosis Orders   1. Cellulitis of right lower extremity   Prescribed doxycycline 100 mg twice daily for 7 days. Will check labs. Advised to go to the emergency room if condition worsens  Also prescribed Lasix 20 mg daily to help with swelling doxycycline hyclate (VIBRAMYCIN) 100 MG capsule    furosemide (LASIX) 20 MG tablet    Basic Metabolic Panel    CBC Auto Differential    traMADol (ULTRAM) 50 MG tablet   2. Cellulitis of left lower extremity  doxycycline hyclate (VIBRAMYCIN) 100 MG capsule    furosemide (LASIX) 20 MG tablet    Basic Metabolic Panel    CBC Auto Differential    traMADol (ULTRAM) 50 MG tablet   3. Pain in both lower extremities   Patient's pain is severe, constant, unfortunately not responding to Lyrica, Tylenol, ibuprofen. I believe the pain is worse due to swelling and cellulitis. Will prescribe short course of tramadol for pain control. [place] was reviewed prior to prescribing traMADol (ULTRAM) 50 MG tablet   4.  Type 2 diabetes mellitus with other circulatory complication, unspecified whether long term insulin use (HCC)   Improved, hemoglobin A1c 7.8% today POCT glycosylated hemoglobin (Hb A1C)   5. PAD (peripheral artery disease) [facility])   Patient strongly advised to follow-up with his vascular surgeon in [facility]. I asked his sister to make sure he makes an appointment for follow-up since he has not seen them for over a year                    Completed Refills   Requested Prescriptions     Signed Prescriptions Disp Refills    doxycycline hyclate (VIBRAMYCIN) 100 MG capsule 14 capsule 0     Sig: Take 1 capsule by mouth 2 times daily for 7 days    furosemide (LASIX) 20 MG tablet 10 tablet 3     Sig: Take 1 tablet by mouth daily for 10 days    traMADol (ULTRAM) 50 MG tablet 12 tablet 0     Sig: Take 1 tablet by mouth every 6 hours as needed for Pain for up to 3 days. 1 or 2 tabs every 6 hours as needed for pain. Return in about 2 weeks (around 6/5/2020). Orders Placed This Encounter   Medications    doxycycline hyclate (VIBRAMYCIN) 100 MG capsule     Sig: Take 1 capsule by mouth 2 times daily for 7 days     Dispense:  14 capsule     Refill:  0    furosemide (LASIX) 20 MG tablet     Sig: Take 1 tablet by mouth daily for 10 days     Dispense:  10 tablet     Refill:  3    traMADol (ULTRAM) 50 MG tablet     Sig: Take 1 tablet by mouth every 6 hours as needed for Pain for up to 3 days. 1 or 2 tabs every 6 hours as needed for pain. Dispense:  12 tablet     Refill:  0     Reduce doses taken as pain becomes manageable     Orders Placed This Encounter   Procedures    Basic Metabolic Panel     Standing Status:   Future     Standing Expiration Date:   5/22/2021    CBC Auto Differential     Standing Status:   Future     Standing Expiration Date:   5/22/2021    POCT glycosylated hemoglobin (Hb A1C)         Patient Instructions     SURVEY:    You may be receiving a survey from [employer] regarding your visit today.     Please complete the survey to enable us to provide the highest quality of care to you and your family. If you cannot score us a very good on any question, please call the office to discuss how we could have made your experience a very good one. Thank you. Electronically signed by Brayden Palm MD on 5/22/2020 at 12:08 PM           Completed Refills      Requested Prescriptions     Signed Prescriptions Disp Refills    doxycycline hyclate (VIBRAMYCIN) 100 MG capsule 14 capsule 0     Sig: Take 1 capsule by mouth 2 times daily for 7 days    furosemide (LASIX) 20 MG tablet 10 tablet 3     Sig: Take 1 tablet by mouth daily for 10 days    traMADol (ULTRAM) 50 MG tablet 12 tablet 0     Sig: Take 1 tablet by mouth every 6 hours as needed for Pain for up to 3 days. 1 or 2 tabs every 6 hours as needed for pain.

## 2020-05-22 NOTE — PATIENT INSTRUCTIONS
SURVEY:    You may be receiving a survey from [employer] regarding your visit today. Please complete the survey to enable us to provide the highest quality of care to you and your family. If you cannot score us a very good on any question, please call the office to discuss how we could have made your experience a very good one. Thank you.

## 2020-06-05 ENCOUNTER — OFFICE VISIT (OUTPATIENT)
Dept: FAMILY MEDICINE CLINIC | Age: 65
End: 2020-06-05
Payer: MEDICARE

## 2020-06-05 VITALS
OXYGEN SATURATION: 96 % | DIASTOLIC BLOOD PRESSURE: 68 MMHG | SYSTOLIC BLOOD PRESSURE: 139 MMHG | WEIGHT: 210 LBS | HEIGHT: 71 IN | RESPIRATION RATE: 18 BRPM | HEART RATE: 85 BPM | BODY MASS INDEX: 29.4 KG/M2

## 2020-06-05 PROCEDURE — 3017F COLORECTAL CA SCREEN DOC REV: CPT | Performed by: INTERNAL MEDICINE

## 2020-06-05 PROCEDURE — 4040F PNEUMOC VAC/ADMIN/RCVD: CPT | Performed by: INTERNAL MEDICINE

## 2020-06-05 PROCEDURE — G8417 CALC BMI ABV UP PARAM F/U: HCPCS | Performed by: INTERNAL MEDICINE

## 2020-06-05 PROCEDURE — 99213 OFFICE O/P EST LOW 20 MIN: CPT | Performed by: INTERNAL MEDICINE

## 2020-06-05 PROCEDURE — 1036F TOBACCO NON-USER: CPT | Performed by: INTERNAL MEDICINE

## 2020-06-05 PROCEDURE — 1123F ACP DISCUSS/DSCN MKR DOCD: CPT | Performed by: INTERNAL MEDICINE

## 2020-06-05 PROCEDURE — G8427 DOCREV CUR MEDS BY ELIG CLIN: HCPCS | Performed by: INTERNAL MEDICINE

## 2020-06-05 RX ORDER — FUROSEMIDE 40 MG/1
40 TABLET ORAL DAILY
Qty: 30 TABLET | Refills: 1 | Status: SHIPPED | OUTPATIENT
Start: 2020-06-05 | End: 2020-08-03

## 2020-06-05 RX ORDER — HYDROCODONE BITARTRATE AND ACETAMINOPHEN 5; 325 MG/1; MG/1
1 TABLET ORAL EVERY 8 HOURS PRN
Qty: 15 TABLET | Refills: 0 | Status: SHIPPED | OUTPATIENT
Start: 2020-06-05 | End: 2020-06-10

## 2020-06-05 SDOH — ECONOMIC STABILITY: TRANSPORTATION INSECURITY
IN THE PAST 12 MONTHS, HAS THE LACK OF TRANSPORTATION KEPT YOU FROM MEDICAL APPOINTMENTS OR FROM GETTING MEDICATIONS?: NO

## 2020-06-05 SDOH — ECONOMIC STABILITY: INCOME INSECURITY: HOW HARD IS IT FOR YOU TO PAY FOR THE VERY BASICS LIKE FOOD, HOUSING, MEDICAL CARE, AND HEATING?: NOT HARD AT ALL

## 2020-06-05 SDOH — ECONOMIC STABILITY: FOOD INSECURITY: WITHIN THE PAST 12 MONTHS, YOU WORRIED THAT YOUR FOOD WOULD RUN OUT BEFORE YOU GOT MONEY TO BUY MORE.: NEVER TRUE

## 2020-06-05 SDOH — ECONOMIC STABILITY: FOOD INSECURITY: WITHIN THE PAST 12 MONTHS, THE FOOD YOU BOUGHT JUST DIDN'T LAST AND YOU DIDN'T HAVE MONEY TO GET MORE.: NEVER TRUE

## 2020-06-05 SDOH — ECONOMIC STABILITY: TRANSPORTATION INSECURITY
IN THE PAST 12 MONTHS, HAS LACK OF TRANSPORTATION KEPT YOU FROM MEETINGS, WORK, OR FROM GETTING THINGS NEEDED FOR DAILY LIVING?: NO

## 2020-06-05 NOTE — PROGRESS NOTES
anatomically tunneled  Very high risk of needing R BKA; rahat considering continued smoking. \"      Past Medical History:     Past Medical History:   Diagnosis Date    Deafness 9/16/2011    Diabetes mellitus ([facility] [address] 9/16/2011    Dyslipidemia 9/16/2011    Hypertension 9/16/2011    MRSA infection 03/23/2018    foot and blood    PAD (peripheral artery disease) ([facility] [address] 9/16/2011    Sinusitis, chronic 9/16/2011    Type II or unspecified type diabetes mellitus without mention of complication, not stated as uncontrolled       Reviewed all health maintenance requirements and orderedappropriate tests  Health Maintenance Due   Topic Date Due    Annual Wellness Visit (AWV)  05/29/2019    Diabetic foot exam  04/04/2020    Lipid screen  04/04/2020       Past Surgical History:     Past Surgical History:   Procedure Laterality Date    HERNIA REPAIR  11-98    INTERTROCHANTERIC HIP FRACTURE SURGERY Right     TOE AMPUTATION Right     All toes on right foot, 4th toe on left foot    VASCULAR SURGERY  10/2015    Right Carotid         Medications:       Prior to Admission medications    Medication Sig Start Date End Date Taking? Authorizing Provider   furosemide (LASIX) 40 MG tablet Take 1 tablet by mouth daily 6/5/20  Yes Tika Mccormick MD   HYDROcodone-acetaminophen (NORCO) 5-325 MG per tablet Take 1 tablet by mouth every 8 hours as needed for Pain for up to 5 days. Intended supply: 3 days. Take lowest dose possible to manage pain 6/5/20 6/10/20 Yes Tika Mccormick MD   blood glucose monitor kit and supplies Test 4-5 times a day & as needed for symptoms of irregular blood glucose. Please dispense supplies that are covered under pt's insurance 5/12/20  Yes Tika Mccormick MD   blood glucose monitor strips Test 4-5 times a day & as needed for symptoms of irregular blood glucose.  5/12/20  Yes Tika Mccormick MD   Lancets MISC 1 each by Does not apply route 4 times daily 5/12/20  Yes Tika Mccormick MD   amLODIPine ([address]) polyuria. Genitourinary: Negative for difficulty urinating and dysuria. Musculoskeletal: Positive for myalgias. BL leg pain   Skin: Negative for rash. Allergic/Immunologic: Negative. Neurological: Negative for weakness and headaches. Psychiatric/Behavioral: Negative for behavioral problems and dysphoric mood. The patient is not nervous/anxious. Physical Exam:     Vitals:  /68   Pulse 85   Resp 18   Ht 5' 11\" (1.803 m)   Wt 210 lb (95.3 kg)   SpO2 96%   BMI 29.29 kg/m²       Physical Exam  Vitals signs reviewed. Constitutional:       General: He is not in acute distress. Appearance: He is well-developed. He is not ill-appearing. HENT:      Head: Normocephalic and atraumatic. Right Ear: External ear normal.      Left Ear: External ear normal.      Mouth/Throat:      Mouth: Mucous membranes are moist.      Pharynx: No oropharyngeal exudate. Eyes:      General: No scleral icterus. Right eye: No discharge. Left eye: No discharge. Conjunctiva/sclera: Conjunctivae normal.      Pupils: Pupils are equal, round, and reactive to light. Neck:      Thyroid: No thyromegaly. Cardiovascular:      Rate and Rhythm: Normal rate and regular rhythm. Heart sounds: Normal heart sounds. No murmur. Pulmonary:      Effort: Pulmonary effort is normal.      Breath sounds: Normal breath sounds. No wheezing or rales. Abdominal:      General: Bowel sounds are normal. There is no distension. Palpations: Abdomen is soft. There is no mass. Tenderness: There is no abdominal tenderness. Musculoskeletal: Normal range of motion. Right lower leg: Edema present. Left lower leg: Edema present. Comments: Right foot partial amputation TMA, missing toes on the left foot. No open wounds. Unable to palpate pedal pulses   Lymphadenopathy:      Cervical: No cervical adenopathy. Skin:     General: Skin is warm and dry. Findings: No rash. Donald Correa MD on 6/10/2020 at 6:52 PM           Completed Refills      Requested Prescriptions     Signed Prescriptions Disp Refills    furosemide (LASIX) 40 MG tablet 30 tablet 1     Sig: Take 1 tablet by mouth daily    HYDROcodone-acetaminophen (NORCO) 5-325 MG per tablet 15 tablet 0     Sig: Take 1 tablet by mouth every 8 hours as needed for Pain for up to 5 days. Intended supply: 3 days.  Take lowest dose possible to manage pain

## 2020-06-10 ASSESSMENT — ENCOUNTER SYMPTOMS
SORE THROAT: 0
ALLERGIC/IMMUNOLOGIC NEGATIVE: 1
WHEEZING: 0
NAUSEA: 0
COUGH: 0
BLOOD IN STOOL: 0
SHORTNESS OF BREATH: 0
CONSTIPATION: 0
ABDOMINAL PAIN: 0

## 2020-07-02 ENCOUNTER — TELEPHONE (OUTPATIENT)
Dept: FAMILY MEDICINE CLINIC | Age: 65
End: 2020-07-02

## 2020-07-02 NOTE — TELEPHONE ENCOUNTER
Pt requesting to be referred to Dr. Julia Belle for pain mgmt and to Dr. Nicolette Verde ([phone]) for Vascular Surgery. Please advise    Health Maintenance   Topic Date Due    Annual Wellness Visit (AWV)  05/29/2019    Diabetic foot exam  04/04/2020    Lipid screen  04/04/2020    Shingles Vaccine (1 of 2) 05/22/2021 (Originally 2/22/2003)    AAA screen  05/22/2021 (Originally 1955)    Diabetic microalbuminuria test  06/03/2021 (Originally 3/4/2017)    Pneumococcal 65+ years Vaccine (2 of 2 - PPSV23) 08/19/2020    Flu vaccine (1) 09/01/2020    A1C test (Diabetic or Prediabetic)  05/22/2021    Potassium monitoring  05/22/2021    Creatinine monitoring  05/22/2021    Diabetic retinal exam  06/04/2021    DTaP/Tdap/Td vaccine (2 - Td) 08/19/2025    Colon cancer screen colonoscopy  11/09/2026    Hepatitis C screen  Completed    Hepatitis A vaccine  Aged Out    Hib vaccine  Aged Out    Meningococcal (ACWY) vaccine  Aged Out             (applicable per patient's age: Cancer Screenings, Depression Screening, Fall Risk Screening, Immunizations)    Hemoglobin A1C (%)   Date Value   05/22/2020 7.8   02/20/2020 12.4   04/04/2019 9.4     Microalb/Crt. Ratio (mcg/mg creat)   Date Value   03/04/2016 17 (H)     LDL Cholesterol (mg/dL)   Date Value   04/04/2019 54     AST (U/L)   Date Value   03/22/2018     DISREGARD RESULTS. TEST WAS ORDERED ON INCORRECT PATIENT.   03/22/2018 9     ALT (U/L)   Date Value   03/22/2018     DISREGARD RESULTS.  TEST WAS ORDERED ON INCORRECT PATIENT.   03/22/2018 7     BUN (mg/dL)   Date Value   05/22/2020 36 (H)      (goal A1C is < 7)   (goal LDL is <100) need 30-50% reduction from baseline     BP Readings from Last 3 Encounters:   06/05/20 139/68   05/22/20 122/84   02/20/20 138/70    (goal /80)      All Future Testing planned in [facility]:  Lab Frequency Next Occurrence   Lipid Panel Once 06/29/2020   Hepatic Function Panel Once 83/91/8372   Basic Metabolic Panel Once 07/06/2020       Next Visit Date:  Future Appointments   Date Time Provider Shira Becerra   9/4/2020  3:00 PM Wesley Russell MD [facility]            Patient Active Problem List:     Deafness     PAD (peripheral artery disease) ([facility] [address]     Hypertension     Type 2 diabetes mellitus with circulatory disorder (HCC)     Closed nondisplaced basicervical fracture of right femur (HCC)     Diabetic peripheral neuropathy ([address]     Mixed hyperlipidemia     Gangrene of foot ([facility])     Benign prostatic hyperplasia     Bilateral carotid artery occlusion     Osteomyelitis, multiple sites ([address]

## 2020-07-06 RX ORDER — FENOFIBRATE 160 MG/1
TABLET ORAL
Qty: 30 TABLET | Refills: 1 | Status: SHIPPED | OUTPATIENT
Start: 2020-07-06 | End: 2020-09-02

## 2020-07-07 NOTE — TELEPHONE ENCOUNTER
Can you please check with vascular surgeon if they will see him in referral given his extensive vascular problems in the past   Thanks

## 2020-07-08 RX ORDER — PEN NEEDLE, DIABETIC 31 G X1/4"
NEEDLE, DISPOSABLE MISCELLANEOUS
Qty: 100 EACH | Refills: 2 | Status: ON HOLD
Start: 2020-07-08 | End: 2020-08-18 | Stop reason: HOSPADM

## 2020-07-30 RX ORDER — LISINOPRIL 2.5 MG/1
TABLET ORAL
Qty: 90 TABLET | Refills: 1 | Status: SHIPPED | OUTPATIENT
Start: 2020-07-30 | End: 2020-12-15 | Stop reason: SDUPTHER

## 2020-07-30 RX ORDER — LOVASTATIN 20 MG/1
TABLET ORAL
Qty: 90 TABLET | Refills: 0 | Status: SHIPPED
Start: 2020-07-30 | End: 2020-08-20 | Stop reason: SDUPTHER

## 2020-07-30 NOTE — TELEPHONE ENCOUNTER
Medication pending    Health Maintenance   Topic Date Due    Annual Wellness Visit (AWV)  05/29/2019    Diabetic foot exam  04/04/2020    Lipid screen  04/04/2020    Shingles Vaccine (1 of 2) 05/22/2021 (Originally 2/22/2003)    AAA screen  05/22/2021 (Originally 1955)    Diabetic microalbuminuria test  06/03/2021 (Originally 3/4/2017)    Pneumococcal 65+ years Vaccine (2 of 2 - PPSV23) 08/19/2020    Flu vaccine (1) 09/01/2020    A1C test (Diabetic or Prediabetic)  05/22/2021    Potassium monitoring  05/22/2021    Creatinine monitoring  05/22/2021    Diabetic retinal exam  06/04/2021    DTaP/Tdap/Td vaccine (2 - Td) 08/19/2025    Colon cancer screen colonoscopy  11/09/2026    Hepatitis C screen  Completed    Hepatitis A vaccine  Aged Out    Hib vaccine  Aged Out    Meningococcal (ACWY) vaccine  Aged Out             (applicable per patient's age: Cancer Screenings, Depression Screening, Fall Risk Screening, Immunizations)    Hemoglobin A1C (%)   Date Value   05/22/2020 7.8   02/20/2020 12.4   04/04/2019 9.4     Microalb/Crt. Ratio (mcg/mg creat)   Date Value   03/04/2016 17 (H)     LDL Cholesterol (mg/dL)   Date Value   04/04/2019 54     AST (U/L)   Date Value   03/22/2018     DISREGARD RESULTS. TEST WAS ORDERED ON INCORRECT PATIENT.   03/22/2018 9     ALT (U/L)   Date Value   03/22/2018     DISREGARD RESULTS.  TEST WAS ORDERED ON INCORRECT PATIENT.   03/22/2018 7     BUN (mg/dL)   Date Value   05/22/2020 36 (H)      (goal A1C is < 7)   (goal LDL is <100) need 30-50% reduction from baseline     BP Readings from Last 3 Encounters:   06/05/20 139/68   05/22/20 122/84   02/20/20 138/70    (goal /80)      All Future Testing planned in [facility]:  Lab Frequency Next Occurrence   Lipid Panel Once 08/05/2020   Hepatic Function Panel Once 56/84/5199   Basic Metabolic Panel Once 20/69/9185       Next Visit Date:  Future Appointments   Date Time Provider Shira Becerra   9/4/2020  3:00 PM Davis Clancy Elisa Guerrero, [address] [facility]            Patient Active Problem List:     Deafness     PAD (peripheral artery disease) ([facility] [address]     Hypertension     Type 2 diabetes mellitus with circulatory disorder (HCC)     Closed nondisplaced basicervical fracture of right femur (HCC)     Diabetic peripheral neuropathy (HCC)     Mixed hyperlipidemia     Gangrene of foot (HCC)     Benign prostatic hyperplasia     Bilateral carotid artery occlusion     Osteomyelitis, multiple sites ([facility] [address]

## 2020-08-03 RX ORDER — FUROSEMIDE 40 MG/1
TABLET ORAL
Qty: 30 TABLET | Refills: 0 | Status: SHIPPED | OUTPATIENT
Start: 2020-08-03 | End: 2020-09-02

## 2020-08-05 ENCOUNTER — HOSPITAL ENCOUNTER (OUTPATIENT)
Dept: PREADMISSION TESTING | Age: 65
Discharge: HOME | End: 2020-08-09
Payer: MEDICARE

## 2020-08-05 ENCOUNTER — NURSE ONLY (OUTPATIENT)
Dept: PRIMARY CARE CLINIC | Age: 65
End: 2020-08-05

## 2020-08-05 VITALS
WEIGHT: 210 LBS | BODY MASS INDEX: 29.4 KG/M2 | RESPIRATION RATE: 16 BRPM | HEART RATE: 81 BPM | HEIGHT: 71 IN | SYSTOLIC BLOOD PRESSURE: 152 MMHG | TEMPERATURE: 97.5 F | OXYGEN SATURATION: 96 % | DIASTOLIC BLOOD PRESSURE: 67 MMHG

## 2020-08-05 LAB
ALBUMIN SERPL-MCNC: 4.1 G/DL (ref 3.5–4.6)
ALP BLD-CCNC: 69 U/L (ref 35–104)
ALT SERPL-CCNC: 16 U/L (ref 0–41)
ANION GAP SERPL CALCULATED.3IONS-SCNC: 14 MEQ/L (ref 9–15)
AST SERPL-CCNC: 16 U/L (ref 0–40)
BILIRUB SERPL-MCNC: <0.2 MG/DL (ref 0.2–0.7)
BUN BLDV-MCNC: 35 MG/DL (ref 8–23)
CALCIUM SERPL-MCNC: 9.3 MG/DL (ref 8.5–9.9)
CHLORIDE BLD-SCNC: 104 MEQ/L (ref 95–107)
CO2: 22 MEQ/L (ref 20–31)
CREAT SERPL-MCNC: 1.16 MG/DL (ref 0.7–1.2)
EKG ATRIAL RATE: 77 BPM
EKG P AXIS: 6 DEGREES
EKG P-R INTERVAL: 134 MS
EKG Q-T INTERVAL: 384 MS
EKG QRS DURATION: 82 MS
EKG QTC CALCULATION (BAZETT): 434 MS
EKG R AXIS: 22 DEGREES
EKG T AXIS: 52 DEGREES
EKG VENTRICULAR RATE: 77 BPM
GFR AFRICAN AMERICAN: >60
GFR NON-AFRICAN AMERICAN: >60
GLOBULIN: 2.8 G/DL (ref 2.3–3.5)
GLUCOSE BLD-MCNC: 352 MG/DL (ref 70–99)
HCT VFR BLD CALC: 37.5 % (ref 42–52)
HEMOGLOBIN: 12.3 G/DL (ref 14–18)
MCH RBC QN AUTO: 29.8 PG (ref 27–31.3)
MCHC RBC AUTO-ENTMCNC: 32.9 % (ref 33–37)
MCV RBC AUTO: 90.5 FL (ref 80–100)
PDW BLD-RTO: 14.2 % (ref 11.5–14.5)
PLATELET # BLD: 218 K/UL (ref 130–400)
POTASSIUM SERPL-SCNC: 5 MEQ/L (ref 3.4–4.9)
RBC # BLD: 4.15 M/UL (ref 4.7–6.1)
SODIUM BLD-SCNC: 140 MEQ/L (ref 135–144)
TOTAL PROTEIN: 6.9 G/DL (ref 6.3–8)
WBC # BLD: 6.5 K/UL (ref 4.8–10.8)

## 2020-08-05 PROCEDURE — U0003 INFECTIOUS AGENT DETECTION BY NUCLEIC ACID (DNA OR RNA); SEVERE ACUTE RESPIRATORY SYNDROME CORONAVIRUS 2 (SARS-COV-2) (CORONAVIRUS DISEASE [COVID-19]), AMPLIFIED PROBE TECHNIQUE, MAKING USE OF HIGH THROUGHPUT TECHNOLOGIES AS DESCRIBED BY CMS-2020-01-R: HCPCS

## 2020-08-05 PROCEDURE — 80053 COMPREHEN METABOLIC PANEL: CPT

## 2020-08-05 PROCEDURE — 85027 COMPLETE CBC AUTOMATED: CPT

## 2020-08-05 PROCEDURE — 93005 ELECTROCARDIOGRAM TRACING: CPT | Performed by: THORACIC SURGERY (CARDIOTHORACIC VASCULAR SURGERY)

## 2020-08-05 RX ORDER — CEFAZOLIN SODIUM 2 G/50ML
2 SOLUTION INTRAVENOUS ONCE
Status: CANCELLED | OUTPATIENT
Start: 2020-08-11

## 2020-08-06 PROCEDURE — 93010 ELECTROCARDIOGRAM REPORT: CPT | Performed by: INTERNAL MEDICINE

## 2020-08-06 RX ORDER — SODIUM CHLORIDE 0.9 % (FLUSH) 0.9 %
10 SYRINGE (ML) INJECTION EVERY 12 HOURS SCHEDULED
Status: CANCELLED | OUTPATIENT
Start: 2020-08-11

## 2020-08-06 RX ORDER — SODIUM CHLORIDE, SODIUM LACTATE, POTASSIUM CHLORIDE, CALCIUM CHLORIDE 600; 310; 30; 20 MG/100ML; MG/100ML; MG/100ML; MG/100ML
INJECTION, SOLUTION INTRAVENOUS CONTINUOUS
Status: CANCELLED | OUTPATIENT
Start: 2020-08-11

## 2020-08-06 RX ORDER — SODIUM CHLORIDE 0.9 % (FLUSH) 0.9 %
10 SYRINGE (ML) INJECTION PRN
Status: CANCELLED | OUTPATIENT
Start: 2020-08-11

## 2020-08-06 RX ORDER — LIDOCAINE HYDROCHLORIDE 10 MG/ML
1 INJECTION, SOLUTION EPIDURAL; INFILTRATION; INTRACAUDAL; PERINEURAL
Status: CANCELLED | OUTPATIENT
Start: 2020-08-11 | End: 2020-08-11

## 2020-08-07 LAB
SARS-COV-2: NOT DETECTED
SOURCE: NORMAL

## 2020-08-12 ENCOUNTER — NURSE ONLY (OUTPATIENT)
Dept: PRIMARY CARE CLINIC | Age: 65
End: 2020-08-12

## 2020-08-13 ENCOUNTER — HOSPITAL ENCOUNTER (OUTPATIENT)
Age: 65
Setting detail: SPECIMEN
Discharge: HOME | End: 2020-08-13
Payer: MEDICARE

## 2020-08-13 PROCEDURE — U0003 INFECTIOUS AGENT DETECTION BY NUCLEIC ACID (DNA OR RNA); SEVERE ACUTE RESPIRATORY SYNDROME CORONAVIRUS 2 (SARS-COV-2) (CORONAVIRUS DISEASE [COVID-19]), AMPLIFIED PROBE TECHNIQUE, MAKING USE OF HIGH THROUGHPUT TECHNOLOGIES AS DESCRIBED BY CMS-2020-01-R: HCPCS

## 2020-08-17 RX ORDER — CEFAZOLIN SODIUM 2 G/50ML
2 SOLUTION INTRAVENOUS ONCE
Status: CANCELLED | OUTPATIENT
Start: 2020-08-18

## 2020-08-17 NOTE — H&P
[address]                      [address] [place], [address] [facility]                              HISTORY AND PHYSICAL    PATIENT NAME: Oziel Roman                      :        1955  MED REC NO:   [id number]                            ROOM:  ACCOUNT NO:   [de-identified]                           ADMIT DATE: 2020  PROVIDER:     Светлана Elder MD    HISTORY OF PRESENT ILLNESS:  A right arm AFDSA, which was scheduled for  , is now being rescheduled for today. This 58-year-old gentleman  was seen in the office on  and we had arranged for this procedure  to be done under MAC anesthetic; however, on the date that was  appointed, the patient and his sister-in-law, Darshana Torres, were not in the  hospital and they were not planning on being there until late in the  afternoon, which was erroneously anticipated and therefore it was  postponed from last week to this week. His prior past medical history,  past surgical history, 12-point review of system, medication review has  not changed and I refer to the dictation for last week for completeness. His pertinent features are that he is hard of hearing. He is 5 feet  9-1/2 inches tall, 208 pounds. His BMI is 31+. His pulse is 84. His  sitting right arm blood pressure is 138/86 and his left is 146/82. He  is on 81 mg aspirin, Plavix, and metformin and this procedure will be  done purely as a diagnostic maneuver. He has had multiple procedures in  the past and they were all done not at [facility], but in [facility]. He is a tobacco user with still current use of a half pack of cigarettes  a day. He is aware that this is a diagnostic test only and that this is  a short stay. Based upon the results of this investigation, we will  offer him potential intervention either with continuation of medical  management or perhaps re-intervention.   In an effort to protect his  privacy and comply with HIPAA regulations, he has authorized us to  discuss the outcome of the procedure with his sister-in-law, Loralee Paget.         Mary Carreon MD    D: 08/17/2020 12:54:52       T: 08/17/2020 13:00:04     [email or web address]  Job#: [id number]     Doc#: [id number]    CC:

## 2020-08-18 ENCOUNTER — ANESTHESIA (OUTPATIENT)
Dept: OPERATING ROOM | Age: 65
End: 2020-08-18
Payer: MEDICARE

## 2020-08-18 ENCOUNTER — HOSPITAL ENCOUNTER (OUTPATIENT)
Age: 65
Setting detail: OUTPATIENT SURGERY
Discharge: HOME | End: 2020-08-18
Attending: THORACIC SURGERY (CARDIOTHORACIC VASCULAR SURGERY) | Admitting: THORACIC SURGERY (CARDIOTHORACIC VASCULAR SURGERY)
Payer: MEDICARE

## 2020-08-18 ENCOUNTER — HOSPITAL ENCOUNTER (OUTPATIENT)
Dept: GENERAL RADIOLOGY | Age: 65
Setting detail: OUTPATIENT SURGERY
Discharge: HOME | End: 2020-08-20
Attending: THORACIC SURGERY (CARDIOTHORACIC VASCULAR SURGERY)
Payer: MEDICARE

## 2020-08-18 ENCOUNTER — ANESTHESIA EVENT (OUTPATIENT)
Dept: OPERATING ROOM | Age: 65
End: 2020-08-18
Payer: MEDICARE

## 2020-08-18 VITALS
HEART RATE: 61 BPM | DIASTOLIC BLOOD PRESSURE: 66 MMHG | WEIGHT: 210 LBS | SYSTOLIC BLOOD PRESSURE: 125 MMHG | BODY MASS INDEX: 29.4 KG/M2 | OXYGEN SATURATION: 94 % | TEMPERATURE: 97.4 F | HEIGHT: 71 IN | RESPIRATION RATE: 16 BRPM

## 2020-08-18 VITALS — OXYGEN SATURATION: 100 % | DIASTOLIC BLOOD PRESSURE: 83 MMHG | SYSTOLIC BLOOD PRESSURE: 174 MMHG

## 2020-08-18 LAB
GLUCOSE BLD-MCNC: 259 MG/DL (ref 60–115)
GLUCOSE BLD-MCNC: 321 MG/DL (ref 60–115)
PERFORMED ON: ABNORMAL
PERFORMED ON: ABNORMAL

## 2020-08-18 PROCEDURE — 7100000001 HC PACU RECOVERY - ADDTL 15 MIN: Performed by: THORACIC SURGERY (CARDIOTHORACIC VASCULAR SURGERY)

## 2020-08-18 PROCEDURE — 7100000000 HC PACU RECOVERY - FIRST 15 MIN: Performed by: THORACIC SURGERY (CARDIOTHORACIC VASCULAR SURGERY)

## 2020-08-18 PROCEDURE — 2580000003 HC RX 258: Performed by: NURSE ANESTHETIST, CERTIFIED REGISTERED

## 2020-08-18 PROCEDURE — C1894 INTRO/SHEATH, NON-LASER: HCPCS | Performed by: THORACIC SURGERY (CARDIOTHORACIC VASCULAR SURGERY)

## 2020-08-18 PROCEDURE — 2580000003 HC RX 258: Performed by: THORACIC SURGERY (CARDIOTHORACIC VASCULAR SURGERY)

## 2020-08-18 PROCEDURE — C1887 CATHETER, GUIDING: HCPCS | Performed by: THORACIC SURGERY (CARDIOTHORACIC VASCULAR SURGERY)

## 2020-08-18 PROCEDURE — 76000 FLUOROSCOPY <1 HR PHYS/QHP: CPT

## 2020-08-18 PROCEDURE — 7100000011 HC PHASE II RECOVERY - ADDTL 15 MIN: Performed by: THORACIC SURGERY (CARDIOTHORACIC VASCULAR SURGERY)

## 2020-08-18 PROCEDURE — 6360000002 HC RX W HCPCS: Performed by: THORACIC SURGERY (CARDIOTHORACIC VASCULAR SURGERY)

## 2020-08-18 PROCEDURE — 7100000010 HC PHASE II RECOVERY - FIRST 15 MIN: Performed by: THORACIC SURGERY (CARDIOTHORACIC VASCULAR SURGERY)

## 2020-08-18 PROCEDURE — 6360000004 HC RX CONTRAST MEDICATION: Performed by: THORACIC SURGERY (CARDIOTHORACIC VASCULAR SURGERY)

## 2020-08-18 PROCEDURE — 6370000000 HC RX 637 (ALT 250 FOR IP): Performed by: ANESTHESIOLOGY

## 2020-08-18 PROCEDURE — 2709999900 HC NON-CHARGEABLE SUPPLY: Performed by: THORACIC SURGERY (CARDIOTHORACIC VASCULAR SURGERY)

## 2020-08-18 PROCEDURE — 3600000002 HC SURGERY LEVEL 2 BASE: Performed by: THORACIC SURGERY (CARDIOTHORACIC VASCULAR SURGERY)

## 2020-08-18 PROCEDURE — 3700000001 HC ADD 15 MINUTES (ANESTHESIA): Performed by: THORACIC SURGERY (CARDIOTHORACIC VASCULAR SURGERY)

## 2020-08-18 PROCEDURE — 2500000003 HC RX 250 WO HCPCS: Performed by: NURSE ANESTHETIST, CERTIFIED REGISTERED

## 2020-08-18 PROCEDURE — C1769 GUIDE WIRE: HCPCS | Performed by: THORACIC SURGERY (CARDIOTHORACIC VASCULAR SURGERY)

## 2020-08-18 PROCEDURE — 2580000003 HC RX 258: Performed by: ANESTHESIOLOGY

## 2020-08-18 PROCEDURE — 6360000002 HC RX W HCPCS: Performed by: NURSE ANESTHETIST, CERTIFIED REGISTERED

## 2020-08-18 PROCEDURE — 3700000000 HC ANESTHESIA ATTENDED CARE: Performed by: THORACIC SURGERY (CARDIOTHORACIC VASCULAR SURGERY)

## 2020-08-18 PROCEDURE — 3600000012 HC SURGERY LEVEL 2 ADDTL 15MIN: Performed by: THORACIC SURGERY (CARDIOTHORACIC VASCULAR SURGERY)

## 2020-08-18 RX ORDER — SODIUM CHLORIDE 0.9 % (FLUSH) 0.9 %
10 SYRINGE (ML) INJECTION PRN
Status: DISCONTINUED | OUTPATIENT
Start: 2020-08-18 | End: 2020-08-18 | Stop reason: HOSPADM

## 2020-08-18 RX ORDER — LIDOCAINE HYDROCHLORIDE 10 MG/ML
1 INJECTION, SOLUTION EPIDURAL; INFILTRATION; INTRACAUDAL; PERINEURAL
Status: COMPLETED | OUTPATIENT
Start: 2020-08-18 | End: 2020-08-18

## 2020-08-18 RX ORDER — DIPHENHYDRAMINE HYDROCHLORIDE 50 MG/ML
12.5 INJECTION INTRAMUSCULAR; INTRAVENOUS
Status: DISCONTINUED | OUTPATIENT
Start: 2020-08-18 | End: 2020-08-18 | Stop reason: HOSPADM

## 2020-08-18 RX ORDER — FENTANYL CITRATE 50 UG/ML
50 INJECTION, SOLUTION INTRAMUSCULAR; INTRAVENOUS EVERY 10 MIN PRN
Status: DISCONTINUED | OUTPATIENT
Start: 2020-08-18 | End: 2020-08-18 | Stop reason: HOSPADM

## 2020-08-18 RX ORDER — SODIUM CHLORIDE, SODIUM LACTATE, POTASSIUM CHLORIDE, CALCIUM CHLORIDE 600; 310; 30; 20 MG/100ML; MG/100ML; MG/100ML; MG/100ML
INJECTION, SOLUTION INTRAVENOUS CONTINUOUS PRN
Status: DISCONTINUED | OUTPATIENT
Start: 2020-08-18 | End: 2020-08-18 | Stop reason: SDUPTHER

## 2020-08-18 RX ORDER — PROPOFOL 10 MG/ML
INJECTION, EMULSION INTRAVENOUS PRN
Status: DISCONTINUED | OUTPATIENT
Start: 2020-08-18 | End: 2020-08-18 | Stop reason: SDUPTHER

## 2020-08-18 RX ORDER — FENTANYL CITRATE 50 UG/ML
INJECTION, SOLUTION INTRAMUSCULAR; INTRAVENOUS PRN
Status: DISCONTINUED | OUTPATIENT
Start: 2020-08-18 | End: 2020-08-18 | Stop reason: SDUPTHER

## 2020-08-18 RX ORDER — SODIUM CHLORIDE, SODIUM LACTATE, POTASSIUM CHLORIDE, CALCIUM CHLORIDE 600; 310; 30; 20 MG/100ML; MG/100ML; MG/100ML; MG/100ML
INJECTION, SOLUTION INTRAVENOUS CONTINUOUS
Status: DISCONTINUED | OUTPATIENT
Start: 2020-08-18 | End: 2020-08-18 | Stop reason: HOSPADM

## 2020-08-18 RX ORDER — HYDROCODONE BITARTRATE AND ACETAMINOPHEN 5; 325 MG/1; MG/1
2 TABLET ORAL PRN
Status: DISCONTINUED | OUTPATIENT
Start: 2020-08-18 | End: 2020-08-18 | Stop reason: HOSPADM

## 2020-08-18 RX ORDER — METOCLOPRAMIDE HYDROCHLORIDE 5 MG/ML
10 INJECTION INTRAMUSCULAR; INTRAVENOUS
Status: DISCONTINUED | OUTPATIENT
Start: 2020-08-18 | End: 2020-08-18 | Stop reason: HOSPADM

## 2020-08-18 RX ORDER — ONDANSETRON 2 MG/ML
4 INJECTION INTRAMUSCULAR; INTRAVENOUS
Status: DISCONTINUED | OUTPATIENT
Start: 2020-08-18 | End: 2020-08-18 | Stop reason: HOSPADM

## 2020-08-18 RX ORDER — CEFAZOLIN SODIUM 2 G/50ML
2 SOLUTION INTRAVENOUS ONCE
Status: COMPLETED | OUTPATIENT
Start: 2020-08-18 | End: 2020-08-18

## 2020-08-18 RX ORDER — SODIUM CHLORIDE 0.9 % (FLUSH) 0.9 %
10 SYRINGE (ML) INJECTION EVERY 12 HOURS SCHEDULED
Status: DISCONTINUED | OUTPATIENT
Start: 2020-08-18 | End: 2020-08-18 | Stop reason: HOSPADM

## 2020-08-18 RX ORDER — MEPERIDINE HYDROCHLORIDE 25 MG/ML
12.5 INJECTION INTRAMUSCULAR; INTRAVENOUS; SUBCUTANEOUS EVERY 5 MIN PRN
Status: DISCONTINUED | OUTPATIENT
Start: 2020-08-18 | End: 2020-08-18 | Stop reason: HOSPADM

## 2020-08-18 RX ORDER — HYDROCODONE BITARTRATE AND ACETAMINOPHEN 5; 325 MG/1; MG/1
1 TABLET ORAL PRN
Status: DISCONTINUED | OUTPATIENT
Start: 2020-08-18 | End: 2020-08-18 | Stop reason: HOSPADM

## 2020-08-18 RX ORDER — MAGNESIUM HYDROXIDE 1200 MG/15ML
LIQUID ORAL CONTINUOUS PRN
Status: COMPLETED | OUTPATIENT
Start: 2020-08-18 | End: 2020-08-18

## 2020-08-18 RX ORDER — ONDANSETRON 2 MG/ML
INJECTION INTRAMUSCULAR; INTRAVENOUS PRN
Status: DISCONTINUED | OUTPATIENT
Start: 2020-08-18 | End: 2020-08-18 | Stop reason: SDUPTHER

## 2020-08-18 RX ORDER — MIDAZOLAM HYDROCHLORIDE 1 MG/ML
INJECTION INTRAMUSCULAR; INTRAVENOUS PRN
Status: DISCONTINUED | OUTPATIENT
Start: 2020-08-18 | End: 2020-08-18 | Stop reason: SDUPTHER

## 2020-08-18 RX ORDER — LIDOCAINE HYDROCHLORIDE 20 MG/ML
INJECTION, SOLUTION INFILTRATION; PERINEURAL PRN
Status: DISCONTINUED | OUTPATIENT
Start: 2020-08-18 | End: 2020-08-18 | Stop reason: SDUPTHER

## 2020-08-18 RX ORDER — SODIUM CHLORIDE 9 MG/ML
INJECTION, SOLUTION INTRAVENOUS CONTINUOUS
Status: DISCONTINUED | OUTPATIENT
Start: 2020-08-18 | End: 2020-08-18 | Stop reason: HOSPADM

## 2020-08-18 RX ADMIN — PHENYLEPHRINE HYDROCHLORIDE 100 MCG: 10 INJECTION INTRAVENOUS at 11:31

## 2020-08-18 RX ADMIN — PHENYLEPHRINE HYDROCHLORIDE 100 MCG: 10 INJECTION INTRAVENOUS at 11:56

## 2020-08-18 RX ADMIN — FENTANYL CITRATE 25 MCG: 50 INJECTION, SOLUTION INTRAMUSCULAR; INTRAVENOUS at 11:13

## 2020-08-18 RX ADMIN — METOPROLOL TARTRATE 12.5 MG: 25 TABLET, FILM COATED ORAL at 09:29

## 2020-08-18 RX ADMIN — SODIUM CHLORIDE, POTASSIUM CHLORIDE, SODIUM LACTATE AND CALCIUM CHLORIDE: 600; 310; 30; 20 INJECTION, SOLUTION INTRAVENOUS at 09:31

## 2020-08-18 RX ADMIN — PHENYLEPHRINE HYDROCHLORIDE 100 MCG: 10 INJECTION INTRAVENOUS at 11:22

## 2020-08-18 RX ADMIN — LIDOCAINE HYDROCHLORIDE 50 MG: 20 INJECTION, SOLUTION INFILTRATION; PERINEURAL at 11:14

## 2020-08-18 RX ADMIN — LIDOCAINE HYDROCHLORIDE 1 ML: 10 INJECTION, SOLUTION EPIDURAL; INFILTRATION; INTRACAUDAL; PERINEURAL at 09:32

## 2020-08-18 RX ADMIN — CEFAZOLIN SODIUM 2 G: 2 SOLUTION INTRAVENOUS at 11:17

## 2020-08-18 RX ADMIN — FENTANYL CITRATE 25 MCG: 50 INJECTION, SOLUTION INTRAMUSCULAR; INTRAVENOUS at 11:17

## 2020-08-18 RX ADMIN — PHENYLEPHRINE HYDROCHLORIDE 100 MCG: 10 INJECTION INTRAVENOUS at 11:15

## 2020-08-18 RX ADMIN — PHENYLEPHRINE HYDROCHLORIDE 100 MCG: 10 INJECTION INTRAVENOUS at 11:49

## 2020-08-18 RX ADMIN — PHENYLEPHRINE HYDROCHLORIDE 100 MCG: 10 INJECTION INTRAVENOUS at 11:28

## 2020-08-18 RX ADMIN — MIDAZOLAM HYDROCHLORIDE 2 MG: 2 INJECTION, SOLUTION INTRAMUSCULAR; INTRAVENOUS at 11:13

## 2020-08-18 RX ADMIN — PHENYLEPHRINE HYDROCHLORIDE 100 MCG: 10 INJECTION INTRAVENOUS at 11:37

## 2020-08-18 RX ADMIN — ONDANSETRON 4 MG: 2 INJECTION INTRAMUSCULAR; INTRAVENOUS at 11:20

## 2020-08-18 RX ADMIN — PHENYLEPHRINE HYDROCHLORIDE 100 MCG: 10 INJECTION INTRAVENOUS at 11:18

## 2020-08-18 RX ADMIN — SODIUM CHLORIDE, POTASSIUM CHLORIDE, SODIUM LACTATE AND CALCIUM CHLORIDE: 600; 310; 30; 20 INJECTION, SOLUTION INTRAVENOUS at 11:09

## 2020-08-18 RX ADMIN — FENTANYL CITRATE 50 MCG: 50 INJECTION, SOLUTION INTRAMUSCULAR; INTRAVENOUS at 11:14

## 2020-08-18 RX ADMIN — PROPOFOL 150 MG: 10 INJECTION, EMULSION INTRAVENOUS at 11:14

## 2020-08-18 ASSESSMENT — PULMONARY FUNCTION TESTS
PIF_VALUE: 3
PIF_VALUE: 3
PIF_VALUE: 0
PIF_VALUE: 2
PIF_VALUE: 10
PIF_VALUE: 4
PIF_VALUE: 2
PIF_VALUE: 3
PIF_VALUE: 2
PIF_VALUE: 3
PIF_VALUE: 10
PIF_VALUE: 10
PIF_VALUE: 2
PIF_VALUE: 0
PIF_VALUE: 10
PIF_VALUE: 2
PIF_VALUE: 2
PIF_VALUE: 10
PIF_VALUE: 2
PIF_VALUE: 0
PIF_VALUE: 10
PIF_VALUE: 2
PIF_VALUE: 2
PIF_VALUE: 3
PIF_VALUE: 10
PIF_VALUE: 10
PIF_VALUE: 3
PIF_VALUE: 0
PIF_VALUE: 2
PIF_VALUE: 18
PIF_VALUE: 3
PIF_VALUE: 2
PIF_VALUE: 10
PIF_VALUE: 10
PIF_VALUE: 2
PIF_VALUE: 2
PIF_VALUE: 0
PIF_VALUE: 10
PIF_VALUE: 3
PIF_VALUE: 2
PIF_VALUE: 0
PIF_VALUE: 3
PIF_VALUE: 10
PIF_VALUE: 2
PIF_VALUE: 10
PIF_VALUE: 10
PIF_VALUE: 2

## 2020-08-18 ASSESSMENT — PAIN - FUNCTIONAL ASSESSMENT: PAIN_FUNCTIONAL_ASSESSMENT: 0-10

## 2020-08-18 ASSESSMENT — PAIN DESCRIPTION - LOCATION
LOCATION: ARM
LOCATION: ARM

## 2020-08-18 ASSESSMENT — PAIN DESCRIPTION - PAIN TYPE
TYPE: SURGICAL PAIN
TYPE: SURGICAL PAIN

## 2020-08-18 ASSESSMENT — PAIN SCALES - GENERAL
PAINLEVEL_OUTOF10: 4
PAINLEVEL_OUTOF10: 5

## 2020-08-18 NOTE — PROGRESS NOTES
DC instructions given to pt, pt verbalized understanding with no further questions.  Electronically signed by Federica Salazar RN on 8/18/2020 at 1:34 PM

## 2020-08-18 NOTE — ANESTHESIA POSTPROCEDURE EVALUATION
Department of Anesthesiology  Postprocedure Note    Patient: Khalida Pressley  MRN: [id number]  YOB: 1955  Date of evaluation: 8/18/2020  Time:  12:09 PM     Procedure Summary     Date:  08/18/20 Room / Location:  [address] [place] [address]    Anesthesia Start:  1109 Anesthesia Stop:  4159    Procedure:  RIGHT ARM AORTO FEMORAL DIGITAL SUBTRACTION ARTERIOGRAPHY (Right Arm Upper) Diagnosis:  (OCCLUSIVE DISEASE)    Surgeon:  Soraya Chaudhari MD Responsible Provider:  MIRANDA Bosch CRNA    Anesthesia Type:  MAC ASA Status:  4          Anesthesia Type: MAC    Abdirashid Phase I:      Abdirashid Phase II:      Last vitals: Reviewed and per EMR flowsheets.        Anesthesia Post Evaluation    Patient participation: complete - patient cannot participate  Level of consciousness: responsive to light touch  Pain score: 0  Airway patency: patent  Nausea & Vomiting: no nausea and no vomiting  Complications: no  Cardiovascular status: blood pressure returned to baseline and hemodynamically stable  Respiratory status: acceptable, spontaneous ventilation, oral airway, airway suctioned and nonlabored ventilation  Hydration status: euvolemic

## 2020-08-18 NOTE — ANESTHESIA PRE PROCEDURE
Department of Anesthesiology  Preprocedure Note       Name:  Ayse Son   Age:  77 y.o.  :  1955                                          MRN:  [id number]         Date:  2020      Surgeon: Mulugeta Brown):  Dipika Ledesma MD    Procedure: Procedure(s):  RIGHT ARM AORTO FEMORAL DIGITAL SUBTRACTION ARTERIOGRAPHY , PAT DONE     Medications prior to admission:   Prior to Admission medications    Medication Sig Start Date End Date Taking? Authorizing Provider   metFORMIN (GLUCOPHAGE) 1000 MG tablet TAKE ONE TABLET BY MOUTH TWICE A DAY WITH MEALS 8/3/20   Neri Vale MD   furosemide (LASIX) 40 MG tablet TAKE ONE TABLET BY MOUTH DAILY 8/3/20   Neri Vale MD   lisinopril (PRINIVIL;ZESTRIL) 2.5 MG tablet TAKE ONE TABLET BY MOUTH DAILY 20   Lex Phillips MD   lovastatin (MEVACOR) 20 MG tablet TAKE 1 TABLET BY MOUTH EVERY DAY 20   Lex Phillips MD   [employer] PEN NEEDLES 31G X 6 MM MISC USE TO INJECT INSULIN ONE TIME DAILY 20   Neri Vale MD   fenofibrate (TRIGLIDE) 160 MG tablet TAKE ONE TABLET BY MOUTH DAILY 20   Neri Vale MD   furosemide (LASIX) 20 MG tablet Take 1 tablet by mouth daily for 10 days 20  Neri Vale MD   blood glucose monitor kit and supplies Test 4-5 times a day & as needed for symptoms of irregular blood glucose. Please dispense supplies that are covered under pt's insurance 20   Neri Vale MD   blood glucose monitor strips Test 4-5 times a day & as needed for symptoms of irregular blood glucose. 20   Neri Vale MD   Lancets MISC 1 each by Does not apply route 4 times daily 20   Neri Vale MD   pregabalin (LYRICA) 100 MG capsule Take 1 capsule by mouth 3 times daily for 30 days.  20  Neri Vale MD   amLODIPine (NORVASC) 10 MG tablet TAKE ONE TABLET BY MOUTH DAILY 20   Neri Vale MD   clopidogrel (PLAVIX) 75 MG tablet TAKE 1 TABLET BY MOUTH DAILY FOR 30DAYS 20   David Emily Koroma MD   insulin glargine (BASAGLAR KWIKPEN) 100 UNIT/ML injection pen Inject 15 Units into the skin 2 times daily 2/20/20   Kimberly Kilgore MD   glipiZIDE (GLUCOTROL XL) 10 MG extended release tablet Take 1 tablet by mouth daily 2/20/20   Kimberly Kilgore MD   clopidogrel (PLAVIX) 75 MG tablet TAKE ONE TABLET BY MOUTH DAILY 9/30/19   Kimberly Kilgore MD   metoprolol tartrate (LOPRESSOR) 25 MG tablet TAKE ONE-HALF TABLET BY MOUTH TWICE A DAY 9/30/19   Kimberly Kilgore MD   sertraline (ZOLOFT) 50 MG tablet TAKE ONE TABLET BY MOUTH DAILY 9/30/19   Kimberly Kilgore MD   glucose monitoring kit (FREESTYLE) monitoring kit 1 kit by Does not apply route 3 times daily And as needed 6/4/19   Kimberly Kilgore MD   blood glucose monitor strips Test 3 times a day & as needed for symptoms of irregular blood glucose. 6/4/19   Kimberly Kilgore MD   amLODIPine (NORVASC) 10 MG tablet TAKE 1 TABLET BY MOUTH EVERY DAY 1/29/19   Kimberly Kilgore MD   sertraline (ZOLOFT) 25 MG tablet TAKE 1 TABLET BY MOUTH EVERY DAY 1/23/19   Kimberly Kilgore MD   glipiZIDE (GLUCOTROL XL) 10 MG extended release tablet TAKE 1 TABLET BY MOUTH DAILY 1/4/19   Kimberly Kilgore MD   cloNIDine (CATAPRES) 0.1 MG tablet Take 1 tablet by mouth daily 1/4/19   Kimberly Kilgore MD   ferrous sulfate 325 (65 Fe) MG tablet Take 1 tablet by mouth 2 times daily  Patient not taking: Reported on 2/20/2020 1/4/19   Kimberly Kilgore MD   cetirizine (ZYRTEC ALLERGY) 10 MG tablet Take 1 tablet by mouth daily  Patient not taking: Reported on 6/5/2020 1/4/19   Kimberly Kilgore MD   docusate (COLACE, DULCOLAX) 100 MG CAPS Take 100 mg by mouth 2 times daily 1/4/19   Kimberly Kilgore MD   zinc sulfate (ZINC-220) 220 (50 Zn) MG capsule Take 1 capsule by mouth daily 1/4/19   Kimberly Kilgore MD   aspirin 81 MG tablet Take 81 mg by mouth daily.     Historical Provider, MD       Current medications:    Current Facility-Administered Medications   Medication Dose Route Frequency Provider Last Rate Last Dose    ceFAZolin (ANCEF) 2 g in dextrose 3 % 50 mL IVPB (duplex)  2 g Intravenous Once Yolande Lind MD        fentaNYL (SUBLIMAZE) injection 50 mcg  50 mcg Intravenous Q10 Min PRN Galdino Corbin MD        HYDROmorphone (DILAUDID) injection 0.5 mg  0.5 mg Intravenous Q10 Min PRN Galdino Corbin MD        HYDROcodone-acetaminophen [facility]) 5-325 MG per tablet 1 tablet  1 tablet Oral PRN Galdino Corbin MD        Or    HYDROcodone-acetaminophen [facility]) 5-325 MG per tablet 2 tablet  2 tablet Oral PRN Galdino Corbin MD        diphenhydrAMINE (BENADRYL) injection 12.5 mg  12.5 mg Intravenous Once PRN Galdino Corbin MD        ondansetron [facility]) injection 4 mg  4 mg Intravenous Once PRN Galdino Corbin MD        metoclopramide [facility]) injection 10 mg  10 mg Intravenous Once PRN Galdino Corbin MD        meperidine (DEMEROL) injection 12.5 mg  12.5 mg Intravenous Q5 Min PRN Galdino Corbin MD        lactated ringers infusion   Intravenous Continuous Galdino Corbin MD        sodium chloride flush 0.9 % injection 10 mL  10 mL Intravenous 2 times per day Galdino Corbin MD        sodium chloride flush 0.9 % injection 10 mL  10 mL Intravenous PRN Galdino Corbin MD        lidocaine PF 1 % injection 1 mL  1 mL Intradermal Once PRN Galdino Corbin MD           Allergies:  No Known Allergies    Problem List:    Patient Active Problem List   Diagnosis Code    Deafness H91.90    PAD (peripheral artery disease) ([address] I73.9    Hypertension I10    Type 2 diabetes mellitus with circulatory disorder ([address] E11.59    Closed nondisplaced basicervical fracture of right femur ([address] S72.044A    Diabetic peripheral neuropathy ([address] E11.42    Mixed hyperlipidemia E78.2    Gangrene of foot ([address] I96    Benign prostatic hyperplasia N40.0    Bilateral carotid artery occlusion I65.23    Osteomyelitis, multiple >60.0 08/05/2020    GLUCOSE 352 08/05/2020    PROT 6.9 08/05/2020    CALCIUM 9.3 08/05/2020    BILITOT <0.2 08/05/2020    ALKPHOS 69 08/05/2020    AST 16 08/05/2020    ALT 16 08/05/2020       POC Tests: No results for input(s): POCGLU, POCNA, POCK, POCCL, POCBUN, POCHEMO, POCHCT in the last 72 hours. Coags: No results found for: PROTIME, INR, APTT    HCG (If Applicable): No results found for: PREGTESTUR, PREGSERUM, HCG, HCGQUANT     ABGs: No results found for: PHART, PO2ART, GVI9MPI, MKL8HZZ, BEART, Q0GLLPCX     Type & Screen (If Applicable):  No results found for: LABABO, LABRH    Drug/Infectious Status (If Applicable):  Lab Results   Component Value Date    HEPCAB NONREACTIVE 11/18/2016       COVID-19 Screening (If Applicable):   Lab Results   Component Value Date    COVID19 Not Detected 08/13/2020         Anesthesia Evaluation  Patient summary reviewed and Nursing notes reviewed no history of anesthetic complications:   Airway: Mallampati: II  TM distance: >3 FB   Neck ROM: full  Mouth opening: > = 3 FB Dental: normal exam         Pulmonary:Negative Pulmonary ROS and normal exam                               Cardiovascular:    (+) hypertension:,       ECG reviewed               Beta Blocker:  Dose within 24 Hrs         Neuro/Psych:   (+) neuromuscular disease:,             GI/Hepatic/Renal: Neg GI/Hepatic/Renal ROS            Endo/Other:    (+) Diabetes, . Pt had PAT visit. Abdominal:           Vascular:   + PVD, aortic or cerebral, . Anesthesia Plan      MAC     ASA 4       Induction: intravenous. MIPS: Prophylactic antiemetics administered. Anesthetic plan and risks discussed with patient. Plan discussed with CRNA.     Attending anesthesiologist reviewed and agrees with Pre Eval content              Janae Simons MD   8/18/2020

## 2020-08-18 NOTE — BRIEF OP NOTE
Brief Postoperative Note      Patient: Lakeisha Osborn  YOB: 1955  MRN: [id number]    Date of Procedure: 8/18/2020    Pre-Op Diagnosis: OCCLUSIVE DISEASE    Post-Op Diagnosis: Same       Procedure(s):  RIGHT ARM AORTO FEMORAL DIGITAL SUBTRACTION ARTERIOGRAPHY WITH S&I    Surgeon(s):  Ronit Bhatia MD    Assistant: TERESITA Mendez      Anesthesia: LMA    Estimated Blood Loss (mL): none  Complications: none  Specimens:   * No specimens in log *    Implants:  * No implants in log *      Drains: * No LDAs found *    Findings: dictated    Electronically signed by Ronit Bhatia MD on 8/18/2020 at 11:44 AM

## 2020-08-19 NOTE — OP NOTE
[address]                      [facility], [address] [facility]                                OPERATIVE REPORT    PATIENT NAME: Yuri Rincon                    :        1955  MED REC NO:   [id number]                            ROOM:  ACCOUNT NO:   [de-identified]                           ADMIT DATE: 2020  PROVIDER:     Yolande Lind MD    DATE OF PROCEDURE:  2020    PROCEDURE PERFORMED:  Right brachial artery approach AF DSA with  supervision and interpretation. SURGEON:  Yolande Lind MD    ANESTHESIA:  LMA. FINDINGS:  A 57-year-old gentleman with lifestyle incapacitating  claudication seen in the office for the first time about a week and a  half to 2 weeks ago, who came from the [facility]. He had had  multiple procedures performed in the [place] area in the past and had  essentially difficulty with walking even 10 feet. In an effort to  define the exact issues with the circulation, he was advised to undergo  this procedure, which was done without complication in the method  described below. OPERATIVE PROCEDURE:  We utilized a total of 100 mL of Optiray 320  contrast and the fluoroscopy time was 6 minutes and 22 seconds. With  the patient properly prepared and identified, we entered the right  brachial artery with a percutaneous puncture technique of micropuncture  system and then advanced a 0.035-inch Glidewire into the thoracic aorta  through a 5-Slovak sheath. We then incorporated our guidewire down into  the descending thoracic aorta with a 5-Slovak  catheter and  then we switched out our catheters for a 5-Slovak racket catheter  followed by straight diagnostic catheters to incorporate down both right  and left femoral systems and deployed these in position and performed  the DSA study. When all was complete to our satisfaction and need, we  removed the wire catheter and sheath from the right arm.   We

## 2020-08-20 ENCOUNTER — TELEPHONE (OUTPATIENT)
Dept: FAMILY MEDICINE CLINIC | Age: 65
End: 2020-08-20

## 2020-08-20 RX ORDER — ROSUVASTATIN CALCIUM 20 MG/1
20 TABLET, COATED ORAL NIGHTLY
Qty: 30 TABLET | Refills: 3 | Status: SHIPPED | OUTPATIENT
Start: 2020-08-20 | End: 2020-12-15 | Stop reason: SDUPTHER

## 2020-08-20 NOTE — TELEPHONE ENCOUNTER
Ok.   I sent new Rx for Crestor 20 mg/day to [facility]
Appointments   Date Time Provider Shira Becerra   9/4/2020  3:00 PM Donavon Gavin MD [facility]            Patient Active Problem List:     Deafness     PAD (peripheral artery disease) ([facility] [address]     Hypertension     Type 2 diabetes mellitus with circulatory disorder ([facility])     Closed nondisplaced basicervical fracture of right femur ([facility])     Diabetic peripheral neuropathy ([address]     Mixed hyperlipidemia     Gangrene of foot ([facility])     Benign prostatic hyperplasia     Bilateral carotid artery occlusion     Osteomyelitis, multiple sites ([address]

## 2020-09-02 RX ORDER — GLIPIZIDE 10 MG/1
TABLET, FILM COATED, EXTENDED RELEASE ORAL
Qty: 30 TABLET | Refills: 3 | Status: SHIPPED | OUTPATIENT
Start: 2020-09-02 | End: 2020-12-15 | Stop reason: SDUPTHER

## 2020-09-02 RX ORDER — FUROSEMIDE 40 MG/1
TABLET ORAL
Qty: 30 TABLET | Refills: 0 | Status: SHIPPED | OUTPATIENT
Start: 2020-09-02 | End: 2020-10-05

## 2020-09-02 RX ORDER — FENOFIBRATE 160 MG/1
TABLET ORAL
Qty: 30 TABLET | Refills: 0 | Status: SHIPPED | OUTPATIENT
Start: 2020-09-02 | End: 2020-10-05

## 2020-09-08 RX ORDER — FENOFIBRATE 160 MG/1
TABLET ORAL
Qty: 30 TABLET | Refills: 0 | OUTPATIENT
Start: 2020-09-08

## 2020-09-08 RX ORDER — INSULIN GLARGINE 100 [IU]/ML
INJECTION, SOLUTION SUBCUTANEOUS
Qty: 5 PEN | Refills: 1 | OUTPATIENT
Start: 2020-09-08

## 2020-09-08 RX ORDER — GLIPIZIDE 10 MG/1
TABLET, FILM COATED, EXTENDED RELEASE ORAL
Qty: 30 TABLET | Refills: 3 | OUTPATIENT
Start: 2020-09-08

## 2020-09-08 RX ORDER — FUROSEMIDE 40 MG/1
TABLET ORAL
Qty: 30 TABLET | Refills: 0 | OUTPATIENT
Start: 2020-09-08

## 2020-09-17 ENCOUNTER — OFFICE VISIT (OUTPATIENT)
Dept: FAMILY MEDICINE CLINIC | Age: 65
End: 2020-09-17
Payer: MEDICARE

## 2020-09-17 VITALS
HEIGHT: 71 IN | RESPIRATION RATE: 18 BRPM | OXYGEN SATURATION: 99 % | DIASTOLIC BLOOD PRESSURE: 85 MMHG | BODY MASS INDEX: 28.84 KG/M2 | SYSTOLIC BLOOD PRESSURE: 159 MMHG | HEART RATE: 58 BPM | WEIGHT: 206 LBS

## 2020-09-17 PROCEDURE — 83036 HEMOGLOBIN GLYCOSYLATED A1C: CPT | Performed by: INTERNAL MEDICINE

## 2020-09-17 PROCEDURE — G8417 CALC BMI ABV UP PARAM F/U: HCPCS | Performed by: INTERNAL MEDICINE

## 2020-09-17 PROCEDURE — 4040F PNEUMOC VAC/ADMIN/RCVD: CPT | Performed by: INTERNAL MEDICINE

## 2020-09-17 PROCEDURE — G8427 DOCREV CUR MEDS BY ELIG CLIN: HCPCS | Performed by: INTERNAL MEDICINE

## 2020-09-17 PROCEDURE — 99214 OFFICE O/P EST MOD 30 MIN: CPT | Performed by: INTERNAL MEDICINE

## 2020-09-17 PROCEDURE — 2022F DILAT RTA XM EVC RTNOPTHY: CPT | Performed by: INTERNAL MEDICINE

## 2020-09-17 PROCEDURE — 3017F COLORECTAL CA SCREEN DOC REV: CPT | Performed by: INTERNAL MEDICINE

## 2020-09-17 PROCEDURE — 4004F PT TOBACCO SCREEN RCVD TLK: CPT | Performed by: INTERNAL MEDICINE

## 2020-09-17 PROCEDURE — 3051F HG A1C>EQUAL 7.0%<8.0%: CPT | Performed by: INTERNAL MEDICINE

## 2020-09-17 PROCEDURE — 1123F ACP DISCUSS/DSCN MKR DOCD: CPT | Performed by: INTERNAL MEDICINE

## 2020-09-17 RX ORDER — AMLODIPINE BESYLATE 10 MG/1
TABLET ORAL
Qty: 30 TABLET | Refills: 5 | Status: SHIPPED | OUTPATIENT
Start: 2020-09-17 | End: 2021-03-19 | Stop reason: SDUPTHER

## 2020-09-17 RX ORDER — INDOMETHACIN 50 MG/1
50 CAPSULE ORAL 3 TIMES DAILY PRN
Qty: 60 CAPSULE | Refills: 1 | Status: SHIPPED | OUTPATIENT
Start: 2020-09-17 | End: 2020-12-15 | Stop reason: SDUPTHER

## 2020-09-17 ASSESSMENT — ENCOUNTER SYMPTOMS
COUGH: 0
ALLERGIC/IMMUNOLOGIC NEGATIVE: 1
VISUAL CHANGE: 0
CONSTIPATION: 0
WHEEZING: 0
NAUSEA: 0
BLOOD IN STOOL: 0
SORE THROAT: 0
ABDOMINAL PAIN: 0
SHORTNESS OF BREATH: 0

## 2020-09-17 NOTE — PROGRESS NOTES
HPI Notes    Name: Jacky Arizmendi  : 1955         Chief Complaint:     Chief Complaint   Patient presents with    Diabetes    Hyperlipidemia    Hypertension       History of Present Illness:        Tabitha Ellis presents to  office to follow-up for diabetes, hypertension, hyperlipidemia. States ran out of Amlodipine. Not sure if still taking Metoprolol. Asking for refills. States takes Insulin Basaglar only in the morning and skips at bedtime. C/o pain in both hands and feet. States tried Tylenol  And OTC Ibuprofen and did not help. On Lyrica. Still having frequent pain in legs and hands. Diabetes   He presents for his follow-up diabetic visit. He has type 2 diabetes mellitus. There are no hypoglycemic associated symptoms. Pertinent negatives for hypoglycemia include no dizziness, headaches or nervousness/anxiousness. Pertinent negatives for diabetes include no chest pain, no fatigue, no polydipsia, no polyuria, no visual change and no weakness. Diabetic complications include PVD. Pertinent negatives for diabetic complications include no CVA or heart disease. Risk factors for coronary artery disease include dyslipidemia, diabetes mellitus, hypertension, male sex, tobacco exposure and sedentary lifestyle. Current diabetic treatment includes insulin injections and oral agent (dual therapy). He is compliant with treatment most of the time. He is following a generally healthy diet. His overall blood glucose range is >200 mg/dl. An ACE inhibitor/angiotensin II receptor blocker is being taken. He does not see a podiatrist.Eye exam is current. Hypertension   This is a chronic problem. The current episode started more than 1 year ago. The problem is unchanged. The problem is uncontrolled. Pertinent negatives include no chest pain, headaches, palpitations, peripheral edema or shortness of breath. Agents associated with hypertension include NSAIDs.  Past treatments include ACE inhibitors, beta blockers and calcium channel blockers (ran out of meds). There are no compliance problems. Hypertensive end-organ damage includes PVD. There is no history of kidney disease, CAD/MI or CVA. Hyperlipidemia   This is a chronic problem. The current episode started more than 1 year ago. The problem is controlled. Exacerbating diseases include diabetes. Associated symptoms include myalgias. Pertinent negatives include no chest pain or shortness of breath. Current antihyperlipidemic treatment includes statins and fibric acid derivatives. The current treatment provides significant improvement of lipids. There are no compliance problems. Past Medical History:     Past Medical History:   Diagnosis Date    Deafness 9/16/2011    Diabetes mellitus ([facility] [address] 9/16/2011    Dyslipidemia 9/16/2011    Hypertension 9/16/2011    MRSA infection 03/23/2018    foot and blood    [facility] 9/16/2011    Sinusitis, chronic 9/16/2011    Type II or unspecified type diabetes mellitus without mention of complication, not stated as uncontrolled       Reviewed all health maintenance requirements and orderedappropriate tests  Health Maintenance Due   Topic Date Due    Annual Wellness Visit (AWV)  05/29/2019    Lipid screen  04/04/2020    Pneumococcal 65+ years Vaccine (2 of 2 - PPSV23) 08/19/2020    Flu vaccine (1) 09/01/2020       Past Surgical History:     Past Surgical History:   Procedure Laterality Date    ARTERIOGRAM Right 8/18/2020    RIGHT ARM AORTO FEMORAL DIGITAL SUBTRACTION ARTERIOGRAPHY performed by Harish Cline MD at [place] [address]    INTERTROCHANTERIC HIP FRACTURE SURGERY Right     TOE AMPUTATION Right     All toes on right foot, 4th toe on left foot    VASCULAR SURGERY  10/2015    Right Carotid         Medications:       Prior to Admission medications    Medication Sig Start Date End Date Taking?  Authorizing Provider   metoprolol tartrate (LOPRESSOR) 25 MG tablet TAKE ONE-HALF TABLET BY MOUTH TWICE A DAY 9/17/20  Yes Natalie Foster MD   amLODIPine (NORVASC) 10 MG tablet TAKE 1 TABLET BY MOUTH EVERY DAY 9/17/20  Yes Natalie Foster MD   indomethacin (INDOCIN) 50 MG capsule Take 1 capsule by mouth 3 times daily as needed for Pain 9/17/20  Yes Natalie Foster MD   furosemide (LASIX) 40 MG tablet TAKE ONE TABLET BY MOUTH DAILY 9/2/20  Yes Natalie Foster MD   metFORMIN (GLUCOPHAGE) 1000 MG tablet TAKE ONE TABLET BY MOUTH TWICE A DAY WITH MEALS 9/2/20  Yes Natalie Foster MD   glipiZIDE (GLUCOTROL XL) 10 MG extended release tablet TAKE ONE TABLET BY MOUTH DAILY 9/2/20  Yes Natalie Foster MD   fenofibrate (TRIGLIDE) 160 MG tablet TAKE ONE TABLET BY MOUTH DAILY 9/2/20  Yes Natalie Foster MD   rosuvastatin (CRESTOR) 20 MG tablet Take 1 tablet by mouth nightly 8/20/20  Yes Natalie Foster MD   lisinopril (PRINIVIL;ZESTRIL) 2.5 MG tablet TAKE ONE TABLET BY MOUTH DAILY 7/30/20  Yes Lex Phillips MD   blood glucose monitor kit and supplies Test 4-5 times a day & as needed for symptoms of irregular blood glucose. Please dispense supplies that are covered under pt's insurance 5/12/20  Yes Natalie Foster MD   Lancets MISC 1 each by Does not apply route 4 times daily 5/12/20  Yes Natalie Foster MD   insulin glargine [facility]) 100 UNIT/ML injection pen Inject 15 Units into the skin 2 times daily 2/20/20  Yes Natalie Foster MD   clopidogrel (PLAVIX) 75 MG tablet TAKE ONE TABLET BY MOUTH DAILY 9/30/19  Yes Natalie Foster MD   blood glucose monitor strips Test 3 times a day & as needed for symptoms of irregular blood glucose.  6/4/19  Yes Natalie Foster MD   sertraline (ZOLOFT) 25 MG tablet TAKE 1 TABLET BY MOUTH EVERY DAY 1/23/19  Yes Natalie Foster MD   cloNIDine (CATAPRES) 0.1 MG tablet Take 1 tablet by mouth daily 1/4/19  Yes Natalie Foster MD   ferrous sulfate 325 (65 Fe) MG tablet Take 1 tablet by mouth 2 times daily 1/4/19  Yes Natalie Foster MD   docusate (COLACE, DULCOLAX) 100 MG CAPS Take 100 mg by mouth 2 times daily 1/4/19  Yes Mary Mcgraw MD   zinc sulfate (ZINC-220) 220 (50 Zn) MG capsule Take 1 capsule by mouth daily 1/4/19  Yes Mary Mcgraw MD   aspirin 81 MG tablet Take 81 mg by mouth daily. Yes Francisco Tan MD   pregabalin (LYRICA) 100 MG capsule Take 1 capsule by mouth 3 times daily for 30 days. 5/5/20 8/5/20  Mary Mcgraw MD   cetirizine (ZYRTEC ALLERGY) 10 MG tablet Take 1 tablet by mouth daily  Patient not taking: Reported on 6/5/2020 1/4/19   Mary Mcgraw MD        Allergies:       Patient has no known allergies. Social History:     Tobacco: reports that he has been smoking cigarettes. He has a 15.00 pack-year smoking history. He has never used smokeless tobacco.  Alcohol:      reports no history of alcohol use. Drug Use:  reports current drug use. Drug: Marijuana. Family History:     Family History   Problem Relation Age of Onset    Diabetes Father        Review of Systems:         Review of Systems   Constitutional: Negative for activity change, appetite change, fatigue and unexpected weight change. HENT: Negative for congestion, ear discharge, ear pain and sore throat. Eyes: Negative for visual disturbance. Respiratory: Negative for cough, shortness of breath and wheezing. Cardiovascular: Negative for chest pain, palpitations and leg swelling. Gastrointestinal: Negative for abdominal pain, blood in stool, constipation and nausea. Endocrine: Negative for cold intolerance, heat intolerance, polydipsia and polyuria. Genitourinary: Negative for difficulty urinating and dysuria. Musculoskeletal: Positive for arthralgias, joint swelling and myalgias. Skin: Negative for rash. Allergic/Immunologic: Negative. Neurological: Negative for dizziness, weakness and headaches. Psychiatric/Behavioral: Negative for behavioral problems and dysphoric mood. The patient is not nervous/anxious. Physical Exam:     Vitals:  BP (!) 159/85   Pulse 58   Resp 18   Ht 5' 11\" (1.803 m)   Wt 206 lb (93.4 kg)   SpO2 99%   BMI 28.73 kg/m²       Physical Exam  Vitals signs reviewed. Constitutional:       General: He is not in acute distress. Appearance: He is well-developed. HENT:      Head: Normocephalic and atraumatic. Comments: Hard of hearing     Right Ear: External ear normal.      Left Ear: External ear normal.      Nose: No congestion. Mouth/Throat:      Pharynx: No posterior oropharyngeal erythema. Eyes:      General: No scleral icterus. Right eye: No discharge. Left eye: No discharge. Neck:      Thyroid: No thyromegaly. Cardiovascular:      Rate and Rhythm: Normal rate and regular rhythm. Heart sounds: Normal heart sounds. No murmur. Pulmonary:      Effort: Pulmonary effort is normal.      Breath sounds: Normal breath sounds. No wheezing or rales. Abdominal:      General: Bowel sounds are normal. There is no distension. Palpations: Abdomen is soft. There is no mass. Tenderness: There is no abdominal tenderness. Musculoskeletal: Normal range of motion. General: No deformity. Right lower leg: No edema. Left lower leg: No edema. Lymphadenopathy:      Cervical: No cervical adenopathy. Skin:     General: Skin is warm and dry. Coloration: Skin is not pale. Findings: No rash. Neurological:      General: No focal deficit present. Mental Status: He is alert and oriented to person, place, and time. Psychiatric:         Mood and Affect: Mood normal.         Behavior: Behavior normal.         Judgment: Judgment normal.       Diabetic Foot Exam    Pulses:  abnormal - unable to feel pulse in the r foot  S/p amputation of toes R foot and 4th left toe.    Edema: negative  Skin: normal    Sensory exam  Monofilament Sensation:  abnormal - impaired sensation both feet  (minimum of 5 random plantar locations tested, daily as needed for Pain

## 2020-09-18 LAB — HBA1C MFR BLD: 9.8 %

## 2020-09-28 ENCOUNTER — OFFICE VISIT (OUTPATIENT)
Dept: FAMILY MEDICINE CLINIC | Age: 65
End: 2020-09-28
Payer: MEDICARE

## 2020-09-28 VITALS
WEIGHT: 205 LBS | HEART RATE: 66 BPM | DIASTOLIC BLOOD PRESSURE: 63 MMHG | OXYGEN SATURATION: 98 % | BODY MASS INDEX: 28.7 KG/M2 | HEIGHT: 71 IN | RESPIRATION RATE: 18 BRPM | SYSTOLIC BLOOD PRESSURE: 126 MMHG

## 2020-09-28 PROCEDURE — G0438 PPPS, INITIAL VISIT: HCPCS | Performed by: INTERNAL MEDICINE

## 2020-09-28 PROCEDURE — 3017F COLORECTAL CA SCREEN DOC REV: CPT | Performed by: INTERNAL MEDICINE

## 2020-09-28 PROCEDURE — 4040F PNEUMOC VAC/ADMIN/RCVD: CPT | Performed by: INTERNAL MEDICINE

## 2020-09-28 PROCEDURE — 1123F ACP DISCUSS/DSCN MKR DOCD: CPT | Performed by: INTERNAL MEDICINE

## 2020-09-28 ASSESSMENT — PATIENT HEALTH QUESTIONNAIRE - PHQ9
2. FEELING DOWN, DEPRESSED OR HOPELESS: 0
SUM OF ALL RESPONSES TO PHQ QUESTIONS 1-9: 1
SUM OF ALL RESPONSES TO PHQ QUESTIONS 1-9: 1
1. LITTLE INTEREST OR PLEASURE IN DOING THINGS: 1
SUM OF ALL RESPONSES TO PHQ9 QUESTIONS 1 & 2: 1

## 2020-09-28 ASSESSMENT — LIFESTYLE VARIABLES: HOW OFTEN DO YOU HAVE A DRINK CONTAINING ALCOHOL: 0

## 2020-09-28 NOTE — PROGRESS NOTES
Medicare Annual Wellness Visit  Name: Jesus Oscar Date: 2020   MRN: [id number] Sex: Male   Age: 77 y.o. Ethnicity: Non-/Non    : 1955 Race: Juno Pinon is here for Medicare AWV    Screenings for behavioral, psychosocial and functional/safety risks, and cognitive dysfunction are all negative except as indicated below. These results, as well as other patient data from the [address] form, are documented in Flowsheets linked to this Encounter. No Known Allergies    Prior to Visit Medications    Medication Sig Taking? Authorizing Provider   metoprolol tartrate (LOPRESSOR) 25 MG tablet TAKE ONE-HALF TABLET BY MOUTH TWICE A DAY Yes Ben Linares MD   amLODIPine (NORVASC) 10 MG tablet TAKE 1 TABLET BY MOUTH EVERY DAY Yes Ben Linares MD   indomethacin (INDOCIN) 50 MG capsule Take 1 capsule by mouth 3 times daily as needed for Pain Yes Ben Linares MD   furosemide (LASIX) 40 MG tablet TAKE ONE TABLET BY MOUTH DAILY Yes Ben Linares MD   metFORMIN (GLUCOPHAGE) 1000 MG tablet TAKE ONE TABLET BY MOUTH TWICE A DAY WITH MEALS Yes Ben Linares MD   glipiZIDE (GLUCOTROL XL) 10 MG extended release tablet TAKE ONE TABLET BY MOUTH DAILY Yes Ben Linares MD   fenofibrate (TRIGLIDE) 160 MG tablet TAKE ONE TABLET BY MOUTH DAILY Yes Ben Linares MD   rosuvastatin (CRESTOR) 20 MG tablet Take 1 tablet by mouth nightly Yes Ben Linares MD   lisinopril (PRINIVIL;ZESTRIL) 2.5 MG tablet TAKE ONE TABLET BY MOUTH DAILY Yes Lex Phillips MD   blood glucose monitor kit and supplies Test 4-5 times a day & as needed for symptoms of irregular blood glucose.   Please dispense supplies that are covered under pt's insurance Yes Ben Linares MD   Lancets MISC 1 each by Does not apply route 4 times daily Yes Ben Linares MD   insulin glargine (BASAGLAR KWIKPEN) 100 UNIT/ML injection pen Inject 15 Units into the skin 2 times daily Yes Ben Linares MD clopidogrel (PLAVIX) 75 MG tablet TAKE ONE TABLET BY MOUTH DAILY Yes Mary Mcgraw MD   blood glucose monitor strips Test 3 times a day & as needed for symptoms of irregular blood glucose. Yes Mary Mcgraw MD   sertraline (ZOLOFT) 25 MG tablet TAKE 1 TABLET BY MOUTH EVERY DAY Yes Mary Mcgraw MD   cloNIDine (CATAPRES) 0.1 MG tablet Take 1 tablet by mouth daily Yes Mary Mcgraw MD   ferrous sulfate 325 (65 Fe) MG tablet Take 1 tablet by mouth 2 times daily Yes Mary Mcgraw MD   docusate (COLACE, DULCOLAX) 100 MG CAPS Take 100 mg by mouth 2 times daily Yes Mary Mcgraw MD   zinc sulfate (ZINC-220) 220 (50 Zn) MG capsule Take 1 capsule by mouth daily Yes Mary Mcgraw MD   aspirin 81 MG tablet Take 81 mg by mouth daily. Yes Francisco Tan MD   pregabalin (LYRICA) 100 MG capsule Take 1 capsule by mouth 3 times daily for 30 days.   Mary Mcgraw MD   cetirizine (ZYRTEC ALLERGY) 10 MG tablet Take 1 tablet by mouth daily  Patient not taking: Reported on 6/5/2020  Mary Mcgraw MD       Past Medical History:   Diagnosis Date    Deafness 9/16/2011    Diabetes mellitus ([facility] [address] 9/16/2011    Dyslipidemia 9/16/2011    Hypertension 9/16/2011    MRSA infection 03/23/2018    foot and blood    PAD (peripheral artery disease) ([facility] [address] 9/16/2011    Sinusitis, chronic 9/16/2011    Type II or unspecified type diabetes mellitus without mention of complication, not stated as uncontrolled        Past Surgical History:   Procedure Laterality Date    ARTERIOGRAM Right 8/18/2020    RIGHT ARM AORTO FEMORAL DIGITAL SUBTRACTION ARTERIOGRAPHY performed by Javier Gloria MD at [place] [address]    INTERTROCHANTERIC HIP FRACTURE SURGERY Right     TOE AMPUTATION Right     All toes on right foot, 4th toe on left foot    VASCULAR SURGERY  10/2015    Right Carotid        Family History   Problem Relation Age of Onset    Diabetes Father        CareTeam (Including outside providers/suppliers regularly involved in providing care):   Patient Care Team:  Romie Hairston MD as PCP - General (Hospitalist)  Romie Hairston MD as PCP - [facility]    Wt Readings from Last 3 Encounters:   09/28/20 205 lb (93 kg)   09/17/20 206 lb (93.4 kg)   08/18/20 210 lb (95.3 kg)     Vitals:    09/28/20 1536 09/28/20 1538   BP: (!) 166/74 126/63   Pulse: 66    Resp: 18    SpO2: 98%    Weight: 205 lb (93 kg)    Height: 5' 11\" (1.803 m)      Body mass index is 28.59 kg/m². Based upon direct observation of the patient, evaluation of cognition reveals remote memory intact, recent memory impaired. General Appearance: alert and oriented to person, place and time, well developed and well- nourished, in no acute distress, hard of hearing  Skin: warm and dry, no rash or erythema  Head: normocephalic and atraumatic  Eyes: pupils equal, round, and reactive to light, extraocular eye movements intact, conjunctivae normal  ENT: tympanic membrane, external ear and ear canal normal bilaterally, nose without deformity, nasal mucosa and turbinates normal without polyps  Neck: supple and non-tender without mass, no thyromegaly or thyroid nodules, no cervical lymphadenopathy  Pulmonary/Chest: clear to auscultation bilaterally- no wheezes, rales or rhonchi, normal air movement, no respiratory distress  Cardiovascular: normal rate, regular rhythm, normal S1 and S2, no murmurs, rubs, clicks, or gallops  Abdomen: soft, non-tender, non-distended, normal bowel sounds, no masses or organomegaly   Extremities: no cyanosis, clubbing or edema,S/p amputation of toes R foot and 4th left toe. Musculoskeletal: normal range of motion, no joint swelling, deformity or tenderness  Neurologic:  no cranial nerve deficit, no facial droop, has abnormal gait, using a walker    Patient's complete Health Risk Assessment and screening values have been reviewed and are found in Flowsheets.  The following problems were reviewed today and where indicated follow up appointments were made and/or referrals ordered. Positive Risk Factor Screenings with Interventions:     Substance History:  Social History     Tobacco History     Smoking Status  Current Every Day Smoker Smoking Frequency  1 pack/day for 15 years (15 pk yrs) Smoking Tobacco Type  Cigarettes    Smokeless Tobacco Use  Never Used          Alcohol History     Alcohol Use Status  No          Drug Use     Drug Use Status  Yes Types  Marijuana          Sexual Activity     Sexually Active  Not Asked               Alcohol Screening:       A score of 8 or more is associated with harmful or hazardous drinking. A score of 13 or more in women, and 15 or more in men, is likely to indicate alcohol dependence. Substance Abuse Interventions:  · Tobacco abuse:  declined meds, states trying to quit on his own    General Health:  General  In general, how would you say your health is?: Very Good  In the past 7 days, have you experienced any of the following? New or Increased Pain, New or Increased Fatigue, Loneliness, Social Isolation, Stress or Anger?: (!) New or Increased Pain  Do you get the social and emotional support that you need?: Yes  Do you have a Living Will?: (!) No  General Health Risk Interventions:  · Pain issues: patient declines any further evaluation/treatment for this issue, has chronic pain in legs, on Lyrica   · Declined Living Will information    Health Habits/Nutrition:  Health Habits/Nutrition  Do you exercise for at least 20 minutes 2-3 times per week?: (!) No  Have you lost any weight without trying in the past 3 months?: No  Do you eat fewer than 2 meals per day?: No  Have you seen a dentist within the past year?: (!) No  Body mass index is 28.59 kg/m².   Health Habits/Nutrition Interventions:  · Inadequate physical activity:  patient is not ready to increase his/her physical activity level at this time has a lot of pain in legs due to PAD  · Declined seeing a dentist stating \" too expensive'    Hearing/Vision:  No exam data present  Hearing/Vision  Do you or your family notice any trouble with your hearing?: (!) Yes  Do you have difficulty driving, watching TV, or doing any of your daily activities because of your eyesight?: No  Have you had an eye exam within the past year?: (!) No  Hearing/Vision Interventions:  · Hearing concerns:  has hearing aids in place  · Vision concerns:  patient declines any further evaluation/treatment for this issue    Safety:  Safety  Do you have working smoke detectors?: (!) No  Have all throw rugs been removed or fastened?: (!) No  Do you have non-slip mats or surfaces in all bathtubs/showers?: (!) No  Do all of your stairways have a railing or banister?: (!) No  Are your doorways, halls and stairs free of clutter?: Yes  Do you always fasten your seatbelt when you are in a car?: (!) No  Safety Interventions:  · Home safety tips provided    ADL:  ADLs  In the past 7 days, did you need help from others to perform any of the following everyday activities? Eating, dressing, grooming, bathing, toileting, or walking/balance?: None  In the past 7 days, did you need help from others to take care of any of the following?  Laundry, housekeeping, banking/finances, shopping, telephone use, food preparation, transportation, or taking medications?: (!) Laundry  ADL Interventions:  · Patient declines any further evaluation/treatment for this issue  · sister helps with l;aundry and cleaning    Personalized Preventive Plan   Current Health Maintenance Status  Immunization History   Administered Date(s) Administered    Influenza Vaccine, unspecified formulation 11/08/2016    Influenza Virus Vaccine 10/10/2014    Influenza Whole 10/10/2014    Influenza, Triv, 3 Years and older, IM (Afluria (5 yrs and older) 11/08/2016    Pneumococcal Conjugate 13-valent ([id number]) 09/21/2018    Pneumococcal Conjugate 7-valent (Prevnar7) 10/10/2014    Pneumococcal Polysaccharide ([id number]) 08/19/2015    Tdap (Boostrix, Adacel) 08/19/2015        Health Maintenance   Topic Date Due    Annual Wellness Visit (AWV)  05/29/2019    Lipid screen  04/04/2020    Pneumococcal 65+ years Vaccine (2 of 2 - PPSV23) 08/19/2020    Flu vaccine (1) 09/01/2020    Shingles Vaccine (1 of 2) 05/22/2021 (Originally 2/22/2003)    AAA screen  05/22/2021 (Originally 1955)    Diabetic microalbuminuria test  06/03/2021 (Originally 3/4/2017)    A1C test (Diabetic or Prediabetic)  12/18/2020    Diabetic retinal exam  06/04/2021    Potassium monitoring  08/05/2021    Creatinine monitoring  08/20/2021    Diabetic foot exam  09/17/2021    DTaP/Tdap/Td vaccine (2 - Td) 08/19/2025    Colon cancer screen colonoscopy  11/09/2026    Hepatitis C screen  Completed    Hepatitis A vaccine  Aged Out    Hib vaccine  Aged Out    Meningococcal (ACWY) vaccine  Aged Out     Recommendations for [facility] Due: see orders and patient instructions/AVS.  . Recommended screening schedule for the next 5-10 years is provided to the patient in written form: see Patient Instructions/AVS.    There are no diagnoses linked to this encounter.

## 2020-10-05 RX ORDER — FUROSEMIDE 40 MG/1
TABLET ORAL
Qty: 30 TABLET | Refills: 0 | Status: SHIPPED | OUTPATIENT
Start: 2020-10-05 | End: 2020-10-27

## 2020-10-05 RX ORDER — FENOFIBRATE 160 MG/1
TABLET ORAL
Qty: 30 TABLET | Refills: 0 | Status: SHIPPED | OUTPATIENT
Start: 2020-10-05 | End: 2020-10-27

## 2020-10-12 RX ORDER — CLOPIDOGREL BISULFATE 75 MG/1
TABLET ORAL
Qty: 28 TABLET | Refills: 4 | Status: SHIPPED | OUTPATIENT
Start: 2020-10-12 | End: 2021-03-02

## 2020-10-27 RX ORDER — FENOFIBRATE 160 MG/1
TABLET ORAL
Qty: 21 TABLET | Refills: 0 | Status: SHIPPED | OUTPATIENT
Start: 2020-10-27 | End: 2020-11-17

## 2020-10-27 RX ORDER — FUROSEMIDE 40 MG/1
TABLET ORAL
Qty: 21 TABLET | Refills: 0 | Status: SHIPPED | OUTPATIENT
Start: 2020-10-27 | End: 2020-11-17

## 2020-10-27 NOTE — TELEPHONE ENCOUNTER
Refill request for fenofibrate and furosemide   Last OV: 9/28/2020  Last RX: 10/05/2020  Next scheduled apt: 12/15/2020  Medication pended.

## 2020-11-17 RX ORDER — FUROSEMIDE 40 MG/1
TABLET ORAL
Qty: 21 TABLET | Refills: 0 | Status: SHIPPED | OUTPATIENT
Start: 2020-11-17 | End: 2020-12-15 | Stop reason: SDUPTHER

## 2020-11-17 RX ORDER — FENOFIBRATE 160 MG/1
TABLET ORAL
Qty: 21 TABLET | Refills: 0 | Status: SHIPPED | OUTPATIENT
Start: 2020-11-17 | End: 2020-12-15 | Stop reason: SDUPTHER

## 2020-11-17 NOTE — TELEPHONE ENCOUNTER
Refill request   Last OV: 9/28/2020  Last RX: 10/27/2020   Next scheduled apt: 12/15/2020  Medication pended    Health Maintenance   Topic Date Due    Lipid screen  04/04/2020    Pneumococcal 65+ years Vaccine (2 of 2 - PPSV23) 08/19/2020    Flu vaccine (1) 09/01/2020    Shingles Vaccine (1 of 2) 05/22/2021 (Originally 2/22/2003)    AAA screen  05/22/2021 (Originally 1955)    Diabetic microalbuminuria test  06/03/2021 (Originally 3/4/2017)    A1C test (Diabetic or Prediabetic)  12/18/2020    Diabetic retinal exam  06/04/2021    Potassium monitoring  08/05/2021    Creatinine monitoring  08/20/2021    Diabetic foot exam  09/17/2021    Annual Wellness Visit (AWV)  09/29/2021    DTaP/Tdap/Td vaccine (2 - Td) 08/19/2025    Colon cancer screen colonoscopy  11/09/2026    Hepatitis C screen  Completed    Hepatitis A vaccine  Aged Out    Hib vaccine  Aged Out    Meningococcal (ACWY) vaccine  Aged Out             (applicable per patient's age: Cancer Screenings, Depression Screening, Fall Risk Screening, Immunizations)    Hemoglobin A1C (%)   Date Value   09/18/2020 9.8   05/22/2020 7.8   02/20/2020 12.4     Microalb/Crt.  Ratio (mcg/mg creat)   Date Value   03/04/2016 17 (H)     LDL Cholesterol (mg/dL)   Date Value   04/04/2019 54     AST (U/L)   Date Value   08/05/2020 16     ALT (U/L)   Date Value   08/05/2020 16     BUN (mg/dL)   Date Value   08/20/2020 25 (H)      (goal A1C is < 7)   (goal LDL is <100) need 30-50% reduction from baseline     BP Readings from Last 3 Encounters:   09/28/20 126/63   09/17/20 (!) 159/85   08/18/20 125/66    (goal /80)      All Future Testing planned in [facility]:  Lab Frequency Next Occurrence   Lipid Panel Once 06/05/2021   Hepatic Function Panel Once 04/49/1022   Basic Metabolic Panel Once 61/79/3714   Lipid Panel Once 09/17/2021       Next Visit Date:  Future Appointments   Date Time Provider Shira Becerra   12/15/2020  1:45 PM [facility]            Patient Active Problem List:     Deafness     PAD (peripheral artery disease) ([facility] [address]     Hypertension     Type 2 diabetes mellitus with circulatory disorder (HCC)     Closed nondisplaced basicervical fracture of right femur (HCC)     Diabetic peripheral neuropathy (HCC)     Mixed hyperlipidemia     Gangrene of foot (HCC)     Benign prostatic hyperplasia     Bilateral carotid artery occlusion     Osteomyelitis, multiple sites ([facility] [address]

## 2020-12-14 NOTE — PATIENT INSTRUCTIONS
SURVEY:    You may be receiving a survey from [employer] regarding your visit today. Please complete the survey to enable us to provide the highest quality of care to you and your family. If you cannot score us a very good on any question, please call the office to discuss how we could of made your experience a very good one. Thank you. You may be receiving a survey from [employer] regarding your visit today. You may get this in the mail, through your MyChart or in your email. Please complete the survey to enable us to provide the highest quality of care to you and your family. If you cannot score us as very good ( 5 Stars) on any question, please feel free to call the office to discuss how we could have made your experience exceptional.     Thank You!       MD [facility], [address] [place] [address]'

## 2020-12-15 ENCOUNTER — OFFICE VISIT (OUTPATIENT)
Dept: FAMILY MEDICINE CLINIC | Age: 65
End: 2020-12-15
Payer: MEDICARE

## 2020-12-15 VITALS
BODY MASS INDEX: 29.57 KG/M2 | TEMPERATURE: 97.3 F | OXYGEN SATURATION: 97 % | HEART RATE: 67 BPM | DIASTOLIC BLOOD PRESSURE: 68 MMHG | SYSTOLIC BLOOD PRESSURE: 127 MMHG | WEIGHT: 212 LBS

## 2020-12-15 LAB — HBA1C MFR BLD: 9.3 %

## 2020-12-15 PROCEDURE — G8417 CALC BMI ABV UP PARAM F/U: HCPCS | Performed by: INTERNAL MEDICINE

## 2020-12-15 PROCEDURE — 90732 PPSV23 VACC 2 YRS+ SUBQ/IM: CPT | Performed by: INTERNAL MEDICINE

## 2020-12-15 PROCEDURE — 3017F COLORECTAL CA SCREEN DOC REV: CPT | Performed by: INTERNAL MEDICINE

## 2020-12-15 PROCEDURE — 99214 OFFICE O/P EST MOD 30 MIN: CPT | Performed by: INTERNAL MEDICINE

## 2020-12-15 PROCEDURE — 4004F PT TOBACCO SCREEN RCVD TLK: CPT | Performed by: INTERNAL MEDICINE

## 2020-12-15 PROCEDURE — 1123F ACP DISCUSS/DSCN MKR DOCD: CPT | Performed by: INTERNAL MEDICINE

## 2020-12-15 PROCEDURE — G0008 ADMIN INFLUENZA VIRUS VAC: HCPCS | Performed by: INTERNAL MEDICINE

## 2020-12-15 PROCEDURE — G8482 FLU IMMUNIZE ORDER/ADMIN: HCPCS | Performed by: INTERNAL MEDICINE

## 2020-12-15 PROCEDURE — 2022F DILAT RTA XM EVC RTNOPTHY: CPT | Performed by: INTERNAL MEDICINE

## 2020-12-15 PROCEDURE — 4040F PNEUMOC VAC/ADMIN/RCVD: CPT | Performed by: INTERNAL MEDICINE

## 2020-12-15 PROCEDURE — 90686 IIV4 VACC NO PRSV 0.5 ML IM: CPT | Performed by: INTERNAL MEDICINE

## 2020-12-15 PROCEDURE — G8427 DOCREV CUR MEDS BY ELIG CLIN: HCPCS | Performed by: INTERNAL MEDICINE

## 2020-12-15 PROCEDURE — G0009 ADMIN PNEUMOCOCCAL VACCINE: HCPCS | Performed by: INTERNAL MEDICINE

## 2020-12-15 PROCEDURE — 3046F HEMOGLOBIN A1C LEVEL >9.0%: CPT | Performed by: INTERNAL MEDICINE

## 2020-12-15 RX ORDER — SERTRALINE HYDROCHLORIDE 25 MG/1
TABLET, FILM COATED ORAL
Qty: 90 TABLET | Refills: 1 | Status: SHIPPED
Start: 2020-12-15 | End: 2020-12-15 | Stop reason: DRUGHIGH

## 2020-12-15 RX ORDER — CLONIDINE HYDROCHLORIDE 0.1 MG/1
0.1 TABLET ORAL DAILY
Qty: 90 TABLET | Refills: 1 | Status: SHIPPED | OUTPATIENT
Start: 2020-12-15 | End: 2021-05-25

## 2020-12-15 RX ORDER — ROSUVASTATIN CALCIUM 20 MG/1
20 TABLET, COATED ORAL NIGHTLY
Qty: 30 TABLET | Refills: 3 | Status: SHIPPED | OUTPATIENT
Start: 2020-12-15 | End: 2021-03-01 | Stop reason: ALTCHOICE

## 2020-12-15 RX ORDER — GLUCOSAMINE HCL/CHONDROITIN SU 500-400 MG
CAPSULE ORAL
Qty: 200 STRIP | Refills: 1 | Status: SHIPPED | OUTPATIENT
Start: 2020-12-15

## 2020-12-15 RX ORDER — INDOMETHACIN 50 MG/1
50 CAPSULE ORAL 3 TIMES DAILY PRN
Qty: 60 CAPSULE | Refills: 1 | Status: SHIPPED | OUTPATIENT
Start: 2020-12-15 | End: 2021-06-18 | Stop reason: SDUPTHER

## 2020-12-15 RX ORDER — CETIRIZINE HYDROCHLORIDE 10 MG/1
10 TABLET ORAL DAILY
Qty: 30 TABLET | Refills: 5 | Status: SHIPPED | OUTPATIENT
Start: 2020-12-15 | End: 2021-06-18 | Stop reason: SDUPTHER

## 2020-12-15 RX ORDER — INSULIN GLARGINE 100 [IU]/ML
15 INJECTION, SOLUTION SUBCUTANEOUS 2 TIMES DAILY
Qty: 5 PEN | Refills: 3 | Status: SHIPPED | OUTPATIENT
Start: 2020-12-15 | End: 2020-12-15 | Stop reason: SDUPTHER

## 2020-12-15 RX ORDER — FENOFIBRATE 160 MG/1
TABLET ORAL
Qty: 30 TABLET | Refills: 3 | Status: SHIPPED | OUTPATIENT
Start: 2020-12-15 | End: 2021-05-17

## 2020-12-15 RX ORDER — FERROUS SULFATE 325(65) MG
1 TABLET ORAL 2 TIMES DAILY
Qty: 60 TABLET | Refills: 3 | Status: SHIPPED | OUTPATIENT
Start: 2020-12-15 | End: 2021-06-18 | Stop reason: SDUPTHER

## 2020-12-15 RX ORDER — LISINOPRIL 2.5 MG/1
TABLET ORAL
Qty: 90 TABLET | Refills: 1 | Status: SHIPPED | OUTPATIENT
Start: 2020-12-15 | End: 2021-06-18 | Stop reason: SDUPTHER

## 2020-12-15 RX ORDER — FUROSEMIDE 40 MG/1
TABLET ORAL
Qty: 30 TABLET | Refills: 3 | Status: SHIPPED | OUTPATIENT
Start: 2020-12-15 | End: 2021-06-18 | Stop reason: SDUPTHER

## 2020-12-15 RX ORDER — PSEUDOEPHEDRINE HCL 30 MG
100 TABLET ORAL 2 TIMES DAILY
Qty: 60 CAPSULE | Refills: 5 | Status: SHIPPED | OUTPATIENT
Start: 2020-12-15 | End: 2021-06-18 | Stop reason: SDUPTHER

## 2020-12-15 RX ORDER — INSULIN GLARGINE 100 [IU]/ML
20 INJECTION, SOLUTION SUBCUTANEOUS 2 TIMES DAILY
Qty: 5 PEN | Refills: 3 | Status: SHIPPED | OUTPATIENT
Start: 2020-12-15 | End: 2021-03-19 | Stop reason: ALTCHOICE

## 2020-12-15 RX ORDER — SERTRALINE HYDROCHLORIDE 100 MG/1
100 TABLET, FILM COATED ORAL DAILY
Qty: 30 TABLET | Refills: 3 | Status: SHIPPED | OUTPATIENT
Start: 2020-12-15 | End: 2021-06-18 | Stop reason: SDUPTHER

## 2020-12-15 RX ORDER — PREGABALIN 100 MG/1
100 CAPSULE ORAL 3 TIMES DAILY
Qty: 90 CAPSULE | Refills: 1 | Status: SHIPPED | OUTPATIENT
Start: 2020-12-15 | End: 2021-06-18 | Stop reason: SDUPTHER

## 2020-12-15 RX ORDER — GLIPIZIDE 10 MG/1
TABLET, FILM COATED, EXTENDED RELEASE ORAL
Qty: 30 TABLET | Refills: 3 | Status: SHIPPED
Start: 2020-12-15 | End: 2021-06-18 | Stop reason: SINTOL

## 2020-12-15 ASSESSMENT — ENCOUNTER SYMPTOMS
NAUSEA: 0
BLURRED VISION: 0
CONSTIPATION: 0
WHEEZING: 0
SHORTNESS OF BREATH: 0
ALLERGIC/IMMUNOLOGIC NEGATIVE: 1
SORE THROAT: 0
ABDOMINAL PAIN: 0
COUGH: 0
BLOOD IN STOOL: 0

## 2020-12-15 NOTE — PROGRESS NOTES
HPI Notes    Name: Melissa Camara  : 1955         Chief Complaint:     Chief Complaint   Patient presents with    Diabetes     Patient presents today for 3 month check up.  Hypertension    Abdominal Pain     Patient c/o stomach pain, nausea, vomiting unable to eat for the past few months. History of Present Illness:        Mr. Mary Ann Fraga presents to office to follow-up for diabetes, hypertension, hyperlipidemia  He is accompanied by his sister in [facility]. She states that for the past couple of weeks Amelia Stephenson has been feeling more depressed and anxious/stressed due to his younger brother dying of cancer. Amelia Stephenson does not sleep well, has been having anger and frustration feelings. Denies suicide ideations     States last week did not take his water pill and RLE is swollen now. Has no pain or open lesions in the leg. Has a h/o severe PAD. Still smokes close to 1 ppd     His last hemoglobin A1c was 9.8% on 2020. He was advised to take Basaglar 15 units twice daily instead of once a day. [facility] thinks Amelia Stephenson is not taking his Insulin consistently. He is more compliant with oral medications.      Diabetes He presents for his follow-up diabetic visit. He has type 2 diabetes mellitus. Hypoglycemia symptoms include nervousness/anxiousness. Pertinent negatives for hypoglycemia include no dizziness or headaches. Associated symptoms include foot paresthesias. Pertinent negatives for diabetes include no blurred vision, no chest pain, no fatigue, no polydipsia, no polyphagia, no polyuria and no weakness. There are no hypoglycemic complications. Symptoms are stable. Diabetic complications include peripheral neuropathy and PVD. Pertinent negatives for diabetic complications include no CVA, heart disease or nephropathy. Risk factors for coronary artery disease include diabetes mellitus, dyslipidemia, obesity, male sex, hypertension, sedentary lifestyle and tobacco exposure. Current diabetic treatment includes insulin injections and oral agent (dual therapy). He is compliant with treatment most of the time. His weight is stable. He is following a generally unhealthy diet. Home blood sugar record trend: does not check. An ACE inhibitor/angiotensin II receptor blocker is being taken. Hypertension  This is a chronic problem. The current episode started more than 1 year ago. The problem has been gradually improving since onset. The problem is controlled. Pertinent negatives include no blurred vision, chest pain, headaches, palpitations or shortness of breath. There are no associated agents to hypertension. Past treatments include ACE inhibitors, beta blockers, calcium channel blockers, diuretics, direct vasodilators and central alpha agonists. The current treatment provides significant improvement. There are no compliance problems. Hypertensive end-organ damage includes PVD. There is no history of kidney disease, CAD/MI or CVA.    Hyperlipidemia This is a chronic problem. The current episode started more than 1 year ago. The problem is controlled. Exacerbating diseases include diabetes. Associated symptoms include leg pain. Pertinent negatives include no chest pain, myalgias or shortness of breath. Current antihyperlipidemic treatment includes statins and fibric acid derivatives. The current treatment provides significant improvement of lipids. There are no compliance problems. Past Medical History:     Past Medical History:   Diagnosis Date    Deafness 9/16/2011    Diabetes mellitus ([facility] [address] 9/16/2011    Dyslipidemia 9/16/2011    Hypertension 9/16/2011    MRSA infection 03/23/2018    foot and blood    PAD (peripheral artery disease) ([facility] [address] 9/16/2011    Sinusitis, chronic 9/16/2011    Type II or unspecified type diabetes mellitus without mention of complication, not stated as uncontrolled       Reviewed all health maintenance requirements and orderedappropriate tests  Health Maintenance Due   Topic Date Due    Lipid screen  04/04/2020       Past Surgical History:     Past Surgical History:   Procedure Laterality Date    ARTERIOGRAM Right 8/18/2020    RIGHT ARM AORTO FEMORAL DIGITAL SUBTRACTION ARTERIOGRAPHY performed by Bossman Haynes MD at [place] [id number]    INTERTROCHANTERIC HIP FRACTURE SURGERY Right     TOE AMPUTATION Right     All toes on right foot, 4th toe on left foot    VASCULAR SURGERY  10/2015    Right Carotid         Medications:       Prior to Admission medications    Medication Sig Start Date End Date Taking? Authorizing Provider   furosemide (LASIX) 40 MG tablet TAKE ONE TABLET BY MOUTH DAILY 12/15/20  Yes August Talbot MD   fenofibrate (TRIGLIDE) 160 MG tablet TAKE ONE TABLET BY MOUTH DAILY 12/15/20  Yes August Talbot MD   pregabalin (LYRICA) 100 MG capsule Take 1 capsule by mouth 3 times daily for 30 days.  12/15/20 1/14/21 Yes August Talbot MD ferrous sulfate (IRON 325) 325 (65 Fe) MG tablet Take 1 tablet by mouth 2 times daily 12/15/20  Yes Marylee Arias, MD   blood glucose monitor strips Test 3 times a day & as needed for symptoms of irregular blood glucose.  12/15/20  Yes Marylee Arias, MD   cetirizine (ZYRTEC ALLERGY) 10 MG tablet Take 1 tablet by mouth daily 12/15/20  Yes Marylee Arias, MD   cloNIDine (CATAPRES) 0.1 MG tablet Take 1 tablet by mouth daily 12/15/20  Yes Marylee Arias, MD   lisinopril (PRINIVIL;ZESTRIL) 2.5 MG tablet TAKE ONE TABLET BY MOUTH DAILY 12/15/20  Yes Marylee Arias, MD   docusate (COLACE, DULCOLAX) 100 MG CAPS Take 100 mg by mouth 2 times daily 12/15/20  Yes Marylee Arias, MD   glipiZIDE (GLUCOTROL XL) 10 MG extended release tablet TAKE ONE TABLET BY MOUTH DAILY 12/15/20  Yes Marylee Arias, MD   rosuvastatin (CRESTOR) 20 MG tablet Take 1 tablet by mouth nightly 12/15/20  Yes Marylee Arias, MD   indomethacin (INDOCIN) 50 MG capsule Take 1 capsule by mouth 3 times daily as needed for Pain 12/15/20  Yes Marylee Arias, MD   metFORMIN (GLUCOPHAGE) 1000 MG tablet Take 1 tablet by mouth 2 times daily (with meals) 12/15/20  Yes Marylee Arias, MD   sertraline (ZOLOFT) 100 MG tablet Take 1 tablet by mouth daily 12/15/20  Yes Marylee Arias, MD   insulin glargine (BASAGLAR KWIKPEN) 100 UNIT/ML injection pen Inject 20 Units into the skin 2 times daily 12/15/20  Yes Marylee Arias, MD   clopidogrel (PLAVIX) 75 MG tablet TAKE ONE TABLET BY MOUTH DAILY 10/12/20  Yes Marylee Arias, MD   metoprolol tartrate (LOPRESSOR) 25 MG tablet TAKE ONE-HALF TABLET BY MOUTH TWICE A DAY 9/17/20  Yes Marylee Arias, MD   amLODIPine (NORVASC) 10 MG tablet TAKE 1 TABLET BY MOUTH EVERY DAY 9/17/20  Yes Marylee Arias, MD   Lancets MISC 1 each by Does not apply route 4 times daily 5/12/20  Yes Marylee Arias, MD   zinc sulfate (ZINC-220) 220 (50 Zn) MG capsule Take 1 capsule by mouth daily 1/4/19  Yes Marylee Arias, MD aspirin 81 MG tablet Take 81 mg by mouth daily. Yes Historical Provider, MD   blood glucose monitor kit and supplies Test 4-5 times a day & as needed for symptoms of irregular blood glucose. Please dispense supplies that are covered under pt's insurance  Patient not taking: Reported on 12/15/2020 5/12/20   Ovidio Beauchamp MD        Allergies:       Patient has no known allergies. Social History:     Tobacco: reports that he has been smoking cigarettes. He has a 15.00 pack-year smoking history. He has never used smokeless tobacco.  Alcohol:      reports no history of alcohol use. Drug Use:  reports current drug use. Drug: Marijuana. Family History:     Family History   Problem Relation Age of Onset    Diabetes Father        Review of Systems:         Review of Systems   Constitutional: Negative for activity change, appetite change, fatigue and unexpected weight change. HENT: Negative for congestion, ear discharge, ear pain and sore throat. Eyes: Negative for blurred vision and visual disturbance. Respiratory: Negative for cough, shortness of breath and wheezing. Cardiovascular: Positive for leg swelling. Negative for chest pain and palpitations. Gastrointestinal: Negative for abdominal pain, blood in stool, constipation and nausea. Endocrine: Negative for cold intolerance, heat intolerance, polydipsia, polyphagia and polyuria. Genitourinary: Negative for difficulty urinating and dysuria. Musculoskeletal: Positive for arthralgias. Negative for myalgias. Skin: Negative for rash. Allergic/Immunologic: Negative. Neurological: Negative for dizziness, weakness and headaches. Psychiatric/Behavioral: Positive for dysphoric mood and sleep disturbance. Negative for agitation, behavioral problems, self-injury and suicidal ideas. The patient is nervous/anxious.           Physical Exam: Vitals:  /68 (Site: Left Upper Arm, Position: Sitting, Cuff Size: Large Adult)   Pulse 67   Temp 97.3 °F (36.3 °C)   Wt 212 lb (96.2 kg)   SpO2 97%   BMI 29.57 kg/m²       Physical Exam  Vitals signs reviewed. Constitutional:       General: He is not in acute distress. Appearance: Normal appearance. He is well-developed. HENT:      Head: Normocephalic and atraumatic. Right Ear: Tympanic membrane, ear canal and external ear normal.      Left Ear: Tympanic membrane, ear canal and external ear normal.      Nose: No congestion. Mouth/Throat:      Mouth: Mucous membranes are moist.      Pharynx: Oropharynx is clear. Eyes:      General: No scleral icterus. Right eye: No discharge. Left eye: No discharge. Conjunctiva/sclera: Conjunctivae normal.      Pupils: Pupils are equal, round, and reactive to light. Neck:      Thyroid: No thyromegaly. Cardiovascular:      Rate and Rhythm: Normal rate and regular rhythm. Heart sounds: Normal heart sounds. No murmur. Pulmonary:      Effort: Pulmonary effort is normal.      Breath sounds: Normal breath sounds. No wheezing or rales. Abdominal:      General: Bowel sounds are normal. There is no distension. Palpations: Abdomen is soft. There is no mass. Tenderness: There is no abdominal tenderness. Musculoskeletal: Normal range of motion. Right lower leg: Edema present. Left lower leg: Edema present. Comments: Right foot transmetatarsal amputation   Lymphadenopathy:      Cervical: No cervical adenopathy. Skin:     General: Skin is warm and dry. Coloration: Skin is not pale. Findings: No rash. Neurological:      General: No focal deficit present. Mental Status: He is alert and oriented to person, place, and time. Mental status is at baseline. Sensory: No sensory deficit.       Gait: Gait abnormal.   Psychiatric:         Mood and Affect: Mood normal. Behavior: Behavior normal.         Thought Content: Thought content normal.               Data:     Lab Results   Component Value Date     08/05/2020    K 5.0 08/05/2020     08/05/2020    CO2 22 08/05/2020    BUN 25 08/20/2020    CREATININE 0.95 08/20/2020    GLUCOSE 352 08/05/2020    PROT 6.9 08/05/2020    LABALBU 4.1 08/05/2020    BILITOT <0.2 08/05/2020    ALKPHOS 69 08/05/2020    AST 16 08/05/2020    ALT 16 08/05/2020     Lab Results   Component Value Date    WBC 6.5 08/05/2020    RBC 4.15 08/05/2020    HGB 12.3 08/05/2020    HCT 37.5 08/05/2020    MCV 90.5 08/05/2020    MCH 29.8 08/05/2020    MCHC 32.9 08/05/2020    RDW 14.2 08/05/2020     08/05/2020    MPV NOT REPORTED 05/22/2020     No results found for: TSH  Lab Results   Component Value Date    CHOL 141 04/04/2019    HDL 43 04/04/2019    LABA1C 9.3 12/15/2020          Assessment & Plan        Diagnosis Orders   1. Type 2 diabetes mellitus with other circulatory complication, unspecified whether long term insulin use (HCC)   Patient's HbA1C still high at 9.3% today. Difficult situation as he lives alone and it is not clear if he is compliant with Basaglar. According to Amelia Stephenson he takes it bid almost every day. Will increase Basaglar dose to 20 u bid. Reminded not to skip Insulin injections. Will follow up in 3 months. POCT glycosylated hemoglobin (Hb A1C)    blood glucose monitor strips    glipiZIDE (GLUCOTROL XL) 10 MG extended release tablet    metFORMIN (GLUCOPHAGE) 1000 MG tablet    insulin glargine (BASAGLAR KWIKPEN) 100 UNIT/ML injection pen    DISCONTINUED: insulin glargine (BASAGLAR KWIKPEN) 100 UNIT/ML injection pen   2. Essential hypertension   BP stable, continue on current medical therapy, check BMP. Basic Metabolic Panel    furosemide (LASIX) 40 MG tablet    cloNIDine (CATAPRES) 0.1 MG tablet    lisinopril (PRINIVIL;ZESTRIL) 2.5 MG tablet   3.  Mixed hyperlipidemia Sig: Take 1 tablet by mouth 2 times daily (with meals)    sertraline (ZOLOFT) 100 MG tablet 30 tablet 3     Sig: Take 1 tablet by mouth daily    insulin glargine (BASAGLAR KWIKPEN) 100 UNIT/ML injection pen 5 pen 3     Sig: Inject 20 Units into the skin 2 times daily     Return in about 3 months (around 3/15/2021) for HTN, diabetes, depression. Orders Placed This Encounter   Medications    furosemide (LASIX) 40 MG tablet     Sig: TAKE ONE TABLET BY MOUTH DAILY     Dispense:  30 tablet     Refill:  3    fenofibrate (TRIGLIDE) 160 MG tablet     Sig: TAKE ONE TABLET BY MOUTH DAILY     Dispense:  30 tablet     Refill:  3    pregabalin (LYRICA) 100 MG capsule     Sig: Take 1 capsule by mouth 3 times daily for 30 days. Dispense:  90 capsule     Refill:  1    DISCONTD: insulin glargine (BASAGLAR KWIKPEN) 100 UNIT/ML injection pen     Sig: Inject 15 Units into the skin 2 times daily     Dispense:  5 pen     Refill:  3    ferrous sulfate (IRON 325) 325 (65 Fe) MG tablet     Sig: Take 1 tablet by mouth 2 times daily     Dispense:  60 tablet     Refill:  3    blood glucose monitor strips     Sig: Test 3 times a day & as needed for symptoms of irregular blood glucose.      Dispense:  200 strip     Refill:  1     Please dispense same as glucometer    cetirizine (ZYRTEC ALLERGY) 10 MG tablet     Sig: Take 1 tablet by mouth daily     Dispense:  30 tablet     Refill:  5    cloNIDine (CATAPRES) 0.1 MG tablet     Sig: Take 1 tablet by mouth daily     Dispense:  90 tablet     Refill:  1    DISCONTD: sertraline (ZOLOFT) 25 MG tablet     Sig: TAKE 1 TABLET BY MOUTH EVERY DAY     Dispense:  90 tablet     Refill:  1    lisinopril (PRINIVIL;ZESTRIL) 2.5 MG tablet     Sig: TAKE ONE TABLET BY MOUTH DAILY     Dispense:  90 tablet     Refill:  1    docusate (COLACE, DULCOLAX) 100 MG CAPS     Sig: Take 100 mg by mouth 2 times daily     Dispense:  60 capsule     Refill:  5 You may be receiving a survey from [employer] regarding your visit today. You may get this in the mail, through your MyChart or in your email. Please complete the survey to enable us to provide the highest quality of care to you and your family. If you cannot score us as very good ( 5 Stars) on any question, please feel free to call the office to discuss how we could have made your experience exceptional.     Thank You! MD Gonzalez Uribe, [address] , [facility]'      Electronically signed by Dagoberto Ortiz MD on 12/16/2020 at 11:35 AM           Completed Refills      Requested Prescriptions     Signed Prescriptions Disp Refills    furosemide (LASIX) 40 MG tablet 30 tablet 3     Sig: TAKE ONE TABLET BY MOUTH DAILY    fenofibrate (TRIGLIDE) 160 MG tablet 30 tablet 3     Sig: TAKE ONE TABLET BY MOUTH DAILY    pregabalin (LYRICA) 100 MG capsule 90 capsule 1     Sig: Take 1 capsule by mouth 3 times daily for 30 days.  ferrous sulfate (IRON 325) 325 (65 Fe) MG tablet 60 tablet 3     Sig: Take 1 tablet by mouth 2 times daily    blood glucose monitor strips 200 strip 1     Sig: Test 3 times a day & as needed for symptoms of irregular blood glucose.     cetirizine (ZYRTEC ALLERGY) 10 MG tablet 30 tablet 5     Sig: Take 1 tablet by mouth daily    cloNIDine (CATAPRES) 0.1 MG tablet 90 tablet 1     Sig: Take 1 tablet by mouth daily    lisinopril (PRINIVIL;ZESTRIL) 2.5 MG tablet 90 tablet 1     Sig: TAKE ONE TABLET BY MOUTH DAILY    docusate (COLACE, DULCOLAX) 100 MG CAPS 60 capsule 5     Sig: Take 100 mg by mouth 2 times daily    glipiZIDE (GLUCOTROL XL) 10 MG extended release tablet 30 tablet 3     Sig: TAKE ONE TABLET BY MOUTH DAILY    rosuvastatin (CRESTOR) 20 MG tablet 30 tablet 3     Sig: Take 1 tablet by mouth nightly    indomethacin (INDOCIN) 50 MG capsule 60 capsule 1     Sig: Take 1 capsule by mouth 3 times daily as needed for Pain  metFORMIN (GLUCOPHAGE) 1000 MG tablet 60 tablet 5     Sig: Take 1 tablet by mouth 2 times daily (with meals)    sertraline (ZOLOFT) 100 MG tablet 30 tablet 3     Sig: Take 1 tablet by mouth daily    insulin glargine (BASAGLAR KWIKPEN) 100 UNIT/ML injection pen 5 pen 3     Sig: Inject 20 Units into the skin 2 times daily

## 2020-12-17 RX ORDER — FENOFIBRATE 160 MG/1
TABLET ORAL
Qty: 21 TABLET | Refills: 0 | OUTPATIENT
Start: 2020-12-17

## 2020-12-17 RX ORDER — FUROSEMIDE 40 MG/1
TABLET ORAL
Qty: 21 TABLET | Refills: 0 | OUTPATIENT
Start: 2020-12-17

## 2020-12-21 RX ORDER — LOVASTATIN 20 MG/1
TABLET ORAL
COMMUNITY
Start: 2020-12-15 | End: 2021-03-01

## 2020-12-21 RX ORDER — GLIPIZIDE 10 MG/1
TABLET, FILM COATED, EXTENDED RELEASE ORAL
Qty: 30 TABLET | Refills: 2 | OUTPATIENT
Start: 2020-12-21

## 2020-12-21 RX ORDER — SERTRALINE HYDROCHLORIDE 25 MG/1
TABLET, FILM COATED ORAL
COMMUNITY
Start: 2020-12-15 | End: 2021-10-15

## 2021-01-15 ENCOUNTER — OFFICE VISIT (OUTPATIENT)
Dept: FAMILY MEDICINE CLINIC | Age: 66
End: 2021-01-15
Payer: MEDICARE

## 2021-01-15 VITALS
SYSTOLIC BLOOD PRESSURE: 138 MMHG | HEART RATE: 78 BPM | BODY MASS INDEX: 30.82 KG/M2 | OXYGEN SATURATION: 97 % | TEMPERATURE: 97 F | DIASTOLIC BLOOD PRESSURE: 75 MMHG | WEIGHT: 221 LBS

## 2021-01-15 DIAGNOSIS — L03.115 BILATERAL LOWER LEG CELLULITIS: Primary | ICD-10-CM

## 2021-01-15 DIAGNOSIS — L03.116 BILATERAL LOWER LEG CELLULITIS: Primary | ICD-10-CM

## 2021-01-15 DIAGNOSIS — Z79.899 LONG TERM CURRENT USE OF DIURETIC: ICD-10-CM

## 2021-01-15 PROCEDURE — 3017F COLORECTAL CA SCREEN DOC REV: CPT | Performed by: INTERNAL MEDICINE

## 2021-01-15 PROCEDURE — 99213 OFFICE O/P EST LOW 20 MIN: CPT | Performed by: INTERNAL MEDICINE

## 2021-01-15 PROCEDURE — G8510 SCR DEP NEG, NO PLAN REQD: HCPCS | Performed by: INTERNAL MEDICINE

## 2021-01-15 PROCEDURE — G8427 DOCREV CUR MEDS BY ELIG CLIN: HCPCS | Performed by: INTERNAL MEDICINE

## 2021-01-15 PROCEDURE — 4040F PNEUMOC VAC/ADMIN/RCVD: CPT | Performed by: INTERNAL MEDICINE

## 2021-01-15 PROCEDURE — G8482 FLU IMMUNIZE ORDER/ADMIN: HCPCS | Performed by: INTERNAL MEDICINE

## 2021-01-15 PROCEDURE — G8417 CALC BMI ABV UP PARAM F/U: HCPCS | Performed by: INTERNAL MEDICINE

## 2021-01-15 PROCEDURE — 1123F ACP DISCUSS/DSCN MKR DOCD: CPT | Performed by: INTERNAL MEDICINE

## 2021-01-15 PROCEDURE — 4004F PT TOBACCO SCREEN RCVD TLK: CPT | Performed by: INTERNAL MEDICINE

## 2021-01-15 RX ORDER — DOXYCYCLINE HYCLATE 100 MG
100 TABLET ORAL DAILY
Qty: 10 TABLET | Refills: 0 | Status: SHIPPED | OUTPATIENT
Start: 2021-01-15 | End: 2021-02-08

## 2021-01-15 RX ORDER — METOLAZONE 2.5 MG/1
2.5 TABLET ORAL DAILY
Qty: 30 TABLET | Refills: 3 | Status: SHIPPED | OUTPATIENT
Start: 2021-01-15 | End: 2021-06-18 | Stop reason: SDUPTHER

## 2021-01-15 ASSESSMENT — ENCOUNTER SYMPTOMS
NAUSEA: 0
COUGH: 0
SHORTNESS OF BREATH: 0
WHEEZING: 0
CONSTIPATION: 0
SORE THROAT: 0
ALLERGIC/IMMUNOLOGIC NEGATIVE: 1
CHEST TIGHTNESS: 0
ABDOMINAL PAIN: 0
BLOOD IN STOOL: 0

## 2021-01-15 ASSESSMENT — PATIENT HEALTH QUESTIONNAIRE - PHQ9
1. LITTLE INTEREST OR PLEASURE IN DOING THINGS: 0
SUM OF ALL RESPONSES TO PHQ QUESTIONS 1-9: 0

## 2021-01-15 NOTE — PATIENT INSTRUCTIONS
SURVEY:    You may be receiving a survey from [employer] regarding your visit today. Please complete the survey to enable us to provide the highest quality of care to you and your family. If you cannot score us a very good on any question, please call the office to discuss how we could of made your experience a very good one. Thank you. You may be receiving a survey from [employer] regarding your visit today. You may get this in the mail, through your MyChart or in your email. Please complete the survey to enable us to provide the highest quality of care to you and your family. If you cannot score us as very good ( 5 Stars) on any question, please feel free to call the office to discuss how we could have made your experience exceptional.     Thank You!       MD Herman Shelton, AMOS Ruiz, PCA

## 2021-01-15 NOTE — PROGRESS NOTES
HPI Notes    Name: Faizan Herron  : 1955         Chief Complaint:     Chief Complaint   Patient presents with    Leg Swelling     Patient presents today with leg swelling. Patient states the leg excerises haven't helped. History of Present Illness:        Mohinder Metzger presents to office for evaluation of swelling in lower extremities. States has chronic swelling in lower extremities but it has been getting worse for the past 2 weeks. Was trying to exercise but it did not help. Reports no fevers, no chills  Try to take Lasix twice a day and it still did not help to reduce the swelling. Reports no shortness of breath, no cough, no chest pain, no wheezing            Past Medical History:     Past Medical History:   Diagnosis Date    Deafness 2011    Diabetes mellitus ([facility] [address] 2011    Dyslipidemia 2011    Hypertension 2011    MRSA infection 2018    foot and blood    PAD (peripheral artery disease) ([facility] [address] 2011    Sinusitis, chronic 2011    Type II or unspecified type diabetes mellitus without mention of complication, not stated as uncontrolled       Reviewed all health maintenance requirements and orderedappropriate tests  Health Maintenance Due   Topic Date Due    Lipid screen  2020       Past Surgical History:     Past Surgical History:   Procedure Laterality Date    ARTERIOGRAM Right 2020    RIGHT ARM AORTO FEMORAL DIGITAL SUBTRACTION ARTERIOGRAPHY performed by Gemma Nye MD at [place] [id number]    INTERTROCHANTERIC HIP FRACTURE SURGERY Right     TOE AMPUTATION Right     All toes on right foot, 4th toe on left foot    VASCULAR SURGERY  10/2015    Right Carotid         Medications:       Prior to Admission medications    Medication Sig Start Date End Date Taking?  Authorizing Provider   doxycycline hyclate (VIBRA-TABS) 100 MG tablet Take 1 tablet by mouth daily for 10 days 1/15/21 1/25/21 Yes MD Jack Cheney (ZAROXOLYN) 2.5 MG tablet Take 1 tablet by mouth daily 1/15/21  Yes Geraldine Medrano MD   sertraline (ZOLOFT) 25 MG tablet  12/15/20  Yes Historical Provider, MD   lovastatin (MEVACOR) 20 MG tablet  12/15/20  Yes Historical Provider, MD   furosemide (LASIX) 40 MG tablet TAKE ONE TABLET BY MOUTH DAILY 12/15/20  Yes Geraldine Medrano MD   fenofibrate (TRIGLIDE) 160 MG tablet TAKE ONE TABLET BY MOUTH DAILY 12/15/20  Yes Geraldine Medrano MD   ferrous sulfate (IRON 325) 325 (65 Fe) MG tablet Take 1 tablet by mouth 2 times daily 12/15/20  Yes Geraldine Medrano MD   blood glucose monitor strips Test 3 times a day & as needed for symptoms of irregular blood glucose.  12/15/20  Yes Geraldine Medrano MD   cetirizine (ZYRTEC ALLERGY) 10 MG tablet Take 1 tablet by mouth daily 12/15/20  Yes Geraldine Medrano MD   cloNIDine (CATAPRES) 0.1 MG tablet Take 1 tablet by mouth daily 12/15/20  Yes Geraldine Medrano MD   lisinopril (PRINIVIL;ZESTRIL) 2.5 MG tablet TAKE ONE TABLET BY MOUTH DAILY 12/15/20  Yes Geraldine Medrano MD   docusate (COLACE, DULCOLAX) 100 MG CAPS Take 100 mg by mouth 2 times daily 12/15/20  Yes Geraldine Medrano MD   glipiZIDE (GLUCOTROL XL) 10 MG extended release tablet TAKE ONE TABLET BY MOUTH DAILY 12/15/20  Yes Geraldine Medrano MD   rosuvastatin (CRESTOR) 20 MG tablet Take 1 tablet by mouth nightly 12/15/20  Yes Geraldine Medrano MD   indomethacin (INDOCIN) 50 MG capsule Take 1 capsule by mouth 3 times daily as needed for Pain 12/15/20  Yes Geraldine Medrano MD   metFORMIN (GLUCOPHAGE) 1000 MG tablet Take 1 tablet by mouth 2 times daily (with meals) 12/15/20  Yes Geraldine Medrano MD   sertraline (ZOLOFT) 100 MG tablet Take 1 tablet by mouth daily 12/15/20  Yes Geraldine Medrano MD   insulin glargine (BASAGLAR KWIKPEN) 100 UNIT/ML injection pen Inject 20 Units into the skin 2 times daily 12/15/20  Yes Geraldine Medrano MD   clopidogrel (PLAVIX) 75 MG tablet TAKE ONE TABLET BY MOUTH DAILY 10/12/20  Yes Geraldine Medrano MD metoprolol tartrate (LOPRESSOR) 25 MG tablet TAKE ONE-HALF TABLET BY MOUTH TWICE A DAY 9/17/20  Yes Nicho Chong MD   amLODIPine (NORVASC) 10 MG tablet TAKE 1 TABLET BY MOUTH EVERY DAY 9/17/20  Yes Nicho Chong MD   Lancets MISC 1 each by Does not apply route 4 times daily 5/12/20  Yes Nicho Chong MD   zinc sulfate (ZINC-220) 220 (50 Zn) MG capsule Take 1 capsule by mouth daily 1/4/19  Yes Nicho Chong MD   aspirin 81 MG tablet Take 81 mg by mouth daily. Yes Historical Provider, MD   pregabalin (LYRICA) 100 MG capsule Take 1 capsule by mouth 3 times daily for 30 days. 12/15/20 1/14/21  Nicho Chong MD   blood glucose monitor kit and supplies Test 4-5 times a day & as needed for symptoms of irregular blood glucose. Please dispense supplies that are covered under pt's insurance  Patient not taking: Reported on 12/15/2020 5/12/20   Nicho Chong MD        Allergies:       Patient has no known allergies. Social History:     Tobacco: reports that he has been smoking cigarettes. He has a 15.00 pack-year smoking history. He has never used smokeless tobacco.  Alcohol:      reports no history of alcohol use. Drug Use:  reports current drug use. Drug: Marijuana. Family History:     Family History   Problem Relation Age of Onset    Diabetes Father        Review of Systems:         Review of Systems   Constitutional: Negative for activity change, appetite change, fever and unexpected weight change. HENT: Negative for congestion, ear discharge, ear pain and sore throat. Eyes: Negative for visual disturbance. Respiratory: Negative for cough, chest tightness, shortness of breath and wheezing. Cardiovascular: Positive for leg swelling. Negative for chest pain and palpitations. Gastrointestinal: Negative for abdominal pain, blood in stool, constipation and nausea. Endocrine: Negative for cold intolerance, heat intolerance, polydipsia and polyuria.    Genitourinary: Negative for difficulty urinating and dysuria. Musculoskeletal: Negative. Skin: Negative for rash. Allergic/Immunologic: Negative. Neurological: Negative for dizziness, weakness and headaches. Psychiatric/Behavioral: Negative for behavioral problems and dysphoric mood. The patient is not nervous/anxious. Physical Exam:     Vitals:  /75 (Site: Right Upper Arm, Position: Sitting, Cuff Size: Medium Adult)   Pulse 78   Temp 97 °F (36.1 °C)   Wt 221 lb (100.2 kg)   SpO2 97%   BMI 30.82 kg/m²       Physical Exam  Vitals signs reviewed. Constitutional:       General: He is not in acute distress. Appearance: He is well-developed. HENT:      Head: Normocephalic and atraumatic. Neck:      Thyroid: No thyromegaly. Cardiovascular:      Rate and Rhythm: Normal rate and regular rhythm. Heart sounds: Normal heart sounds. No murmur. Pulmonary:      Effort: Pulmonary effort is normal.      Breath sounds: Normal breath sounds. No wheezing or rales. Abdominal:      General: Bowel sounds are normal. There is no distension. Palpations: Abdomen is soft. There is no mass. Tenderness: There is no abdominal tenderness. Musculoskeletal: Normal range of motion. Right lower leg: Edema present. Left lower leg: Edema present. Comments: 3+ edema in both lower extremities. Lower extremities with multiple healed scars from previous interventions, has few superficial lesions with surrounding redness involving both lower extremities from knees down. Nontender to touch, no draining wounds   Lymphadenopathy:      Cervical: No cervical adenopathy. Skin:     General: Skin is warm and dry. Findings: No rash. Neurological:      Mental Status: He is alert and oriented to person, place, and time.    Psychiatric:         Behavior: Behavior normal.         Judgment: Judgment normal.               Data:     Lab Results   Component Value Date     08/05/2020    K 5.0 08/05/2020  08/05/2020    CO2 22 08/05/2020    BUN 25 08/20/2020    CREATININE 0.95 08/20/2020    GLUCOSE 352 08/05/2020    PROT 6.9 08/05/2020    LABALBU 4.1 08/05/2020    BILITOT <0.2 08/05/2020    ALKPHOS 69 08/05/2020    AST 16 08/05/2020    ALT 16 08/05/2020     Lab Results   Component Value Date    WBC 6.5 08/05/2020    RBC 4.15 08/05/2020    HGB 12.3 08/05/2020    HCT 37.5 08/05/2020    MCV 90.5 08/05/2020    MCH 29.8 08/05/2020    MCHC 32.9 08/05/2020    RDW 14.2 08/05/2020     08/05/2020    MPV NOT REPORTED 05/22/2020     No results found for: TSH  Lab Results   Component Value Date    CHOL 141 04/04/2019    HDL 43 04/04/2019    LABA1C 9.3 12/15/2020          Assessment & Plan        Diagnosis Orders   1. Bilateral lower leg cellulitis   Prescribed doxycycline course. Lower extremities with worsening swelling. He has been trying Lasix 40 mg twice daily with no alleviation. Advised to continue Lasix 40 mg daily and added Zaroxolyn 2.5 mg daily. Reminded to have BMP done in about 1 week. Follow-up as needed if no improvement or worsening in symptoms doxycycline hyclate (VIBRA-TABS) 100 MG tablet    metOLazone (ZAROXOLYN) 2.5 MG tablet   2. Long term current use of diuretic   Check BMP Basic Metabolic Panel                   Completed Refills   Requested Prescriptions     Signed Prescriptions Disp Refills    doxycycline hyclate (VIBRA-TABS) 100 MG tablet 10 tablet 0     Sig: Take 1 tablet by mouth daily for 10 days    metOLazone (ZAROXOLYN) 2.5 MG tablet 30 tablet 3     Sig: Take 1 tablet by mouth daily     No follow-ups on file.      Orders Placed This Encounter   Medications    doxycycline hyclate (VIBRA-TABS) 100 MG tablet     Sig: Take 1 tablet by mouth daily for 10 days     Dispense:  10 tablet     Refill:  0    metOLazone (ZAROXOLYN) 2.5 MG tablet     Sig: Take 1 tablet by mouth daily     Dispense:  30 tablet     Refill:  3     Orders Placed This Encounter   Procedures    Basic Metabolic Panel Standing Status:   Future     Standing Expiration Date:   1/15/2022         Patient Instructions   SURVEY:    You may be receiving a survey from [employer] regarding your visit today. Please complete the survey to enable us to provide the highest quality of care to you and your family. If you cannot score us a very good on any question, please call the office to discuss how we could of made your experience a very good one. Thank you. You may be receiving a survey from [employer] regarding your visit today. You may get this in the mail, through your MyChart or in your email. Please complete the survey to enable us to provide the highest quality of care to you and your family. If you cannot score us as very good ( 5 Stars) on any question, please feel free to call the office to discuss how we could have made your experience exceptional.     Thank You!       MD Francisco Javier Soriano, NICK Marquez, PCA        Electronically signed by Daniella Malave MD on 1/15/2021 at 2:50 PM           Completed Refills      Requested Prescriptions     Signed Prescriptions Disp Refills    doxycycline hyclate (VIBRA-TABS) 100 MG tablet 10 tablet 0     Sig: Take 1 tablet by mouth daily for 10 days    metOLazone (ZAROXOLYN) 2.5 MG tablet 30 tablet 3     Sig: Take 1 tablet by mouth daily

## 2021-02-06 DIAGNOSIS — L03.115 BILATERAL LOWER LEG CELLULITIS: ICD-10-CM

## 2021-02-06 DIAGNOSIS — L03.116 BILATERAL LOWER LEG CELLULITIS: ICD-10-CM

## 2021-02-08 RX ORDER — DOXYCYCLINE HYCLATE 100 MG
TABLET ORAL
Qty: 10 TABLET | Refills: 0 | Status: SHIPPED | OUTPATIENT
Start: 2021-02-08 | End: 2021-03-19 | Stop reason: ALTCHOICE

## 2021-03-01 RX ORDER — LOVASTATIN 20 MG/1
TABLET ORAL
Qty: 16 TABLET | Refills: 4 | Status: SHIPPED | OUTPATIENT
Start: 2021-03-01 | End: 2021-07-23

## 2021-03-02 DIAGNOSIS — I73.9 PAD (PERIPHERAL ARTERY DISEASE) (HCC): ICD-10-CM

## 2021-03-02 DIAGNOSIS — E78.2 MIXED HYPERLIPIDEMIA: ICD-10-CM

## 2021-03-02 DIAGNOSIS — Z79.899 LONG TERM CURRENT USE OF DIURETIC: ICD-10-CM

## 2021-03-02 LAB
A/G RATIO: NORMAL
ALBUMIN SERPL-MCNC: 4.1 G/DL
ALP BLD-CCNC: 80 U/L
ALT SERPL-CCNC: 21 U/L
AST SERPL-CCNC: 14 U/L
BILIRUB SERPL-MCNC: 0.3 MG/DL (ref 0.1–1.4)
BILIRUBIN DIRECT: 0.1 MG/DL
BILIRUBIN, INDIRECT: NORMAL
BUN BLDV-MCNC: 53 MG/DL
CALCIUM SERPL-MCNC: 9.4 MG/DL
CHLORIDE BLD-SCNC: 103 MMOL/L
CHOLESTEROL, TOTAL: 220 MG/DL
CHOLESTEROL/HDL RATIO: 5
CO2: 27 MMOL/L
CREAT SERPL-MCNC: 1.5 MG/DL
GFR CALCULATED: 47
GLOBULIN: NORMAL
GLUCOSE BLD-MCNC: 328 MG/DL
HDLC SERPL-MCNC: 44 MG/DL (ref 35–70)
LDL CHOLESTEROL CALCULATED: 126 MG/DL (ref 0–160)
NONHDLC SERPL-MCNC: 176 MG/DL
POTASSIUM SERPL-SCNC: 4.9 MMOL/L
PROTEIN TOTAL: 7.8 G/DL
SODIUM BLD-SCNC: 135 MMOL/L
TRIGL SERPL-MCNC: 251 MG/DL
VITAMIN D 25-HYDROXY: 10
VITAMIN D2, 25 HYDROXY: NORMAL
VITAMIN D3,25 HYDROXY: NORMAL
VLDLC SERPL CALC-MCNC: NORMAL MG/DL

## 2021-03-02 RX ORDER — FAMOTIDINE 20 MG
1 TABLET ORAL DAILY
Qty: 30 CAPSULE | Refills: 5 | Status: SHIPPED | OUTPATIENT
Start: 2021-03-02 | End: 2021-11-12 | Stop reason: SDUPTHER

## 2021-03-02 RX ORDER — CLOPIDOGREL BISULFATE 75 MG/1
TABLET ORAL
Qty: 28 TABLET | Refills: 3 | Status: SHIPPED | OUTPATIENT
Start: 2021-03-02 | End: 2021-07-23

## 2021-03-15 PROBLEM — Z89.431 ACQUIRED ABSENCE OF RIGHT FOOT (HCC): Status: ACTIVE | Noted: 2021-03-15

## 2021-03-19 ENCOUNTER — OFFICE VISIT (OUTPATIENT)
Dept: FAMILY MEDICINE CLINIC | Age: 66
End: 2021-03-19
Payer: MEDICARE

## 2021-03-19 VITALS
OXYGEN SATURATION: 96 % | HEART RATE: 66 BPM | TEMPERATURE: 97 F | SYSTOLIC BLOOD PRESSURE: 110 MMHG | DIASTOLIC BLOOD PRESSURE: 68 MMHG | BODY MASS INDEX: 29.57 KG/M2 | WEIGHT: 212 LBS

## 2021-03-19 DIAGNOSIS — I73.9 PAD (PERIPHERAL ARTERY DISEASE) (HCC): ICD-10-CM

## 2021-03-19 DIAGNOSIS — Z89.431 ACQUIRED ABSENCE OF RIGHT FOOT (HCC): ICD-10-CM

## 2021-03-19 DIAGNOSIS — E11.59 TYPE 2 DIABETES MELLITUS WITH OTHER CIRCULATORY COMPLICATION, UNSPECIFIED WHETHER LONG TERM INSULIN USE (HCC): Primary | ICD-10-CM

## 2021-03-19 DIAGNOSIS — F32.5 MAJOR DEPRESSIVE DISORDER IN REMISSION, UNSPECIFIED WHETHER RECURRENT (HCC): ICD-10-CM

## 2021-03-19 DIAGNOSIS — I10 ESSENTIAL HYPERTENSION: ICD-10-CM

## 2021-03-19 DIAGNOSIS — E78.2 MIXED HYPERLIPIDEMIA: ICD-10-CM

## 2021-03-19 DIAGNOSIS — M79.605 CHRONIC PAIN OF BOTH LOWER EXTREMITIES: ICD-10-CM

## 2021-03-19 DIAGNOSIS — M79.604 CHRONIC PAIN OF BOTH LOWER EXTREMITIES: ICD-10-CM

## 2021-03-19 DIAGNOSIS — G89.29 CHRONIC PAIN OF BOTH LOWER EXTREMITIES: ICD-10-CM

## 2021-03-19 PROBLEM — I96 GANGRENE OF FOOT (HCC): Status: RESOLVED | Noted: 2018-03-22 | Resolved: 2021-03-19

## 2021-03-19 LAB — HBA1C MFR BLD: 11.4 %

## 2021-03-19 PROCEDURE — G8417 CALC BMI ABV UP PARAM F/U: HCPCS | Performed by: INTERNAL MEDICINE

## 2021-03-19 PROCEDURE — 4040F PNEUMOC VAC/ADMIN/RCVD: CPT | Performed by: INTERNAL MEDICINE

## 2021-03-19 PROCEDURE — 1123F ACP DISCUSS/DSCN MKR DOCD: CPT | Performed by: INTERNAL MEDICINE

## 2021-03-19 PROCEDURE — 3046F HEMOGLOBIN A1C LEVEL >9.0%: CPT | Performed by: INTERNAL MEDICINE

## 2021-03-19 PROCEDURE — 3017F COLORECTAL CA SCREEN DOC REV: CPT | Performed by: INTERNAL MEDICINE

## 2021-03-19 PROCEDURE — 99214 OFFICE O/P EST MOD 30 MIN: CPT | Performed by: INTERNAL MEDICINE

## 2021-03-19 PROCEDURE — G8427 DOCREV CUR MEDS BY ELIG CLIN: HCPCS | Performed by: INTERNAL MEDICINE

## 2021-03-19 PROCEDURE — 2022F DILAT RTA XM EVC RTNOPTHY: CPT | Performed by: INTERNAL MEDICINE

## 2021-03-19 PROCEDURE — 4004F PT TOBACCO SCREEN RCVD TLK: CPT | Performed by: INTERNAL MEDICINE

## 2021-03-19 PROCEDURE — 83036 HEMOGLOBIN GLYCOSYLATED A1C: CPT | Performed by: INTERNAL MEDICINE

## 2021-03-19 PROCEDURE — G8482 FLU IMMUNIZE ORDER/ADMIN: HCPCS | Performed by: INTERNAL MEDICINE

## 2021-03-19 RX ORDER — AMLODIPINE BESYLATE 10 MG/1
TABLET ORAL
Qty: 30 TABLET | Refills: 5 | Status: SHIPPED | OUTPATIENT
Start: 2021-03-19 | End: 2021-11-08

## 2021-03-19 RX ORDER — INSULIN DEGLUDEC 200 U/ML
40 INJECTION, SOLUTION SUBCUTANEOUS DAILY
Qty: 1 PEN | Refills: 5 | Status: SHIPPED | OUTPATIENT
Start: 2021-03-19 | End: 2021-06-18 | Stop reason: SDUPTHER

## 2021-03-19 ASSESSMENT — ENCOUNTER SYMPTOMS
SORE THROAT: 0
CHEST TIGHTNESS: 0
SHORTNESS OF BREATH: 0
WHEEZING: 0
COUGH: 0
NAUSEA: 0
ABDOMINAL PAIN: 0
BLOOD IN STOOL: 0
ALLERGIC/IMMUNOLOGIC NEGATIVE: 1
CONSTIPATION: 0

## 2021-03-19 NOTE — PATIENT INSTRUCTIONS
SURVEY:    You may be receiving a survey from [employer] regarding your visit today. Please complete the survey to enable us to provide the highest quality of care to you and your family. If you cannot score us a very good on any question, please call the office to discuss how we could of made your experience a very good one. Thank you. You may be receiving a survey from [employer] regarding your visit today. You may get this in the mail, through your MyChart or in your email. Please complete the survey to enable us to provide the highest quality of care to you and your family. If you cannot score us as very good ( 5 Stars) on any question, please feel free to call the office to discuss how we could have made your experience exceptional.     Thank You!       MD Vasu Hill, AMOS Harris, PCA

## 2021-03-19 NOTE — PROGRESS NOTES
HPI Notes    Name: Day Welsh  : 1955         Chief Complaint:     Chief Complaint   Patient presents with    Diabetes     Patient presents today for a 3 month check-up    Hypertension    Depression    Edema     Patient c/o bilateral leg swelling and pain. He also c/o hand pain. History of Present Illness:        Bill Ji presents to office to follow-up for diabetes, hypertension, hyperlipidemia, depression, PAD    He is accompanied by his sister Eleuterio Corbin. She states that the patient's living conditions are poor. Apparently his house is infested with bedbugs and they do not have money to hire professional services. She states she is not sure if the patient is taking his medications on time. In the past they tried to convince him to go to assisted living, however Bill Ji declined. She is concerned that Bill Ji gets  in his truck anytime to drive around. States that he has been falling getting in and out of the truck. Patient declines any help. His last hemoglobin A1c was 9.3% on 12/15/2020. Last office visit was 1/15/2021. He complained of increased swelling in lower extremities. Was felt to be secondary to cellulitis. Was treated with doxycycline course. We also added Zaroxolyn to Lasix to help with leg edema. Edema improved, however he complains of chronic pain in his hands and his legs. Has a history of severe PAD, status post multiple interventions. Does not follow-up with vascular surgeon. Sister states that he is not interested in seeing any doctors at this time. Bill Ji presents as a follow up on his depression. Current medication for depression includes  Zoloft. The medication is  being tolerated well. Since starting the antidepressant the symptoms of depression have improved. Bill Ji  is not in counciling. Bill Ji denies suicidal ideation. Diabetes  He presents for his follow-up diabetic visit. He has type 2 diabetes mellitus.  There are no hypoglycemic associated symptoms. Pertinent negatives for hypoglycemia include no dizziness, headaches or nervousness/anxiousness. Associated symptoms include foot paresthesias. Pertinent negatives for diabetes include no chest pain, no fatigue, no polydipsia, no polyuria and no weakness. There are no hypoglycemic complications. Diabetic complications include peripheral neuropathy and PVD. Risk factors for coronary artery disease include dyslipidemia, diabetes mellitus, family history, male sex, hypertension, sedentary lifestyle and tobacco exposure. Current diabetic treatment includes insulin injections and oral agent (dual therapy). He is compliant with treatment some of the time. He is following a generally unhealthy diet. When asked about meal planning, he reported none. His overall blood glucose range is >200 mg/dl. An ACE inhibitor/angiotensin II receptor blocker is being taken. He does not see a podiatrist.Eye exam is not current. Hypertension  This is a chronic problem. The current episode started more than 1 year ago. The problem is unchanged. The problem is controlled. Pertinent negatives include no chest pain, headaches, palpitations or shortness of breath. There are no associated agents to hypertension. Past treatments include ACE inhibitors, diuretics, central alpha agonists, calcium channel blockers and beta blockers. The current treatment provides significant improvement. There are no compliance problems. Hypertensive end-organ damage includes PVD. Hyperlipidemia  This is a chronic problem. The current episode started more than 1 year ago. The problem is uncontrolled. Recent lipid tests were reviewed and are variable. Exacerbating diseases include diabetes. Associated symptoms include leg pain and myalgias. Pertinent negatives include no chest pain or shortness of breath. Current antihyperlipidemic treatment includes fibric acid derivatives and statins.            Past Medical History:     Past Medical History: Diagnosis Date    Deafness 9/16/2011    Diabetes mellitus ([facility] 9/16/2011    Dyslipidemia 9/16/2011    Hypertension 9/16/2011    MRSA infection 03/23/2018    foot and blood    PAD (peripheral artery disease) ([facility] 9/16/2011    Sinusitis, chronic 9/16/2011    Type II or unspecified type diabetes mellitus without mention of complication, not stated as uncontrolled       Reviewed all health maintenance requirements and orderedappropriate tests  Health Maintenance Due   Topic Date Due    COVID-19 Vaccine (1) Never done       Past Surgical History:     Past Surgical History:   Procedure Laterality Date    ARTERIOGRAM Right 8/18/2020    RIGHT ARM AORTO FEMORAL DIGITAL SUBTRACTION ARTERIOGRAPHY performed by Christiano Reid MD at [facility].  11-98    INTERTROCHANTERIC HIP FRACTURE SURGERY Right     TOE AMPUTATION Right     All toes on right foot, 4th toe on left foot    VASCULAR SURGERY  10/2015    Right Carotid         Medications:       Prior to Admission medications    Medication Sig Start Date End Date Taking?  Authorizing Provider   metoprolol tartrate (LOPRESSOR) 25 MG tablet TAKE ONE-HALF TABLET BY MOUTH TWICE A DAY 3/19/21  Yes Kati Almanzar MD   amLODIPine (NORVASC) 10 MG tablet TAKE 1 TABLET BY MOUTH EVERY DAY 3/19/21  Yes Kati Almanzar MD   Insulin Degludec (TRESIBA FLEXTOUCH) 200 UNIT/ML SOPN Inject 40 Units into the skin daily 3/19/21  Yes Kati Almanzar MD   glucose [facility] GLUCOSE) 4 g chewable tablet Take 4 tablets by mouth as needed for Low blood sugar 3/19/21  Yes Kati Almanzar MD   clopidogrel (PLAVIX) 75 MG tablet TAKE ONE TABLET BY MOUTH DAILY 3/2/21  Yes Kati Almanzar MD   Vitamin D, Cholecalciferol, 25 MCG (1000 UT) CAPS Take 1,000 Units by mouth daily 3/2/21  Yes Kati Almanzar MD   lovastatin (MEVACOR) 20 MG tablet TAKE ONE TABLET BY MOUTH DAILY 3/1/21  Yes Kati Almanzar MD   metOLazone (ZAROXOLYN) 2.5 MG tablet Take 1 tablet by mouth daily 1/15/21 Yes Marc Mauricio MD   sertraline (ZOLOFT) 25 MG tablet  12/15/20  Yes Francisco Tan MD   furosemide (LASIX) 40 MG tablet TAKE ONE TABLET BY MOUTH DAILY 12/15/20  Yes Marc Mauricio MD   fenofibrate (TRIGLIDE) 160 MG tablet TAKE ONE TABLET BY MOUTH DAILY 12/15/20  Yes Marc Mauricio MD   ferrous sulfate (IRON 325) 325 (65 Fe) MG tablet Take 1 tablet by mouth 2 times daily 12/15/20  Yes Marc Mauricio MD   blood glucose monitor strips Test 3 times a day & as needed for symptoms of irregular blood glucose. 12/15/20  Yes Marc Mauricio MD   cetirizine (ZYRTEC ALLERGY) 10 MG tablet Take 1 tablet by mouth daily 12/15/20  Yes Marc Mauricio MD   cloNIDine (CATAPRES) 0.1 MG tablet Take 1 tablet by mouth daily 12/15/20  Yes Marc Mauricio MD   lisinopril (PRINIVIL;ZESTRIL) 2.5 MG tablet TAKE ONE TABLET BY MOUTH DAILY 12/15/20  Yes Marc Mauricio MD   docusate (COLACE, DULCOLAX) 100 MG CAPS Take 100 mg by mouth 2 times daily 12/15/20  Yes Marc Mauricio MD   glipiZIDE (GLUCOTROL XL) 10 MG extended release tablet TAKE ONE TABLET BY MOUTH DAILY 12/15/20  Yes Marc Mauricio MD   indomethacin (INDOCIN) 50 MG capsule Take 1 capsule by mouth 3 times daily as needed for Pain 12/15/20  Yes Marc Mauricio MD   metFORMIN (GLUCOPHAGE) 1000 MG tablet Take 1 tablet by mouth 2 times daily (with meals) 12/15/20  Yes Marc Mauricio MD   sertraline (ZOLOFT) 100 MG tablet Take 1 tablet by mouth daily 12/15/20  Yes Marc Mauricio MD   blood glucose monitor kit and supplies Test 4-5 times a day & as needed for symptoms of irregular blood glucose. Please dispense supplies that are covered under pt's insurance 5/12/20  Yes Marc Mauricio MD   Lancets MISC 1 each by Does not apply route 4 times daily 5/12/20  Yes Marc Mauricio MD   zinc sulfate (ZINC-220) 220 (50 Zn) MG capsule Take 1 capsule by mouth daily 1/4/19  Yes Marc Mauricio MD   aspirin 81 MG tablet Take 81 mg by mouth daily.    Yes Historical Provider, MD   pregabalin (LYRICA) 100 MG capsule Take 1 capsule by mouth 3 times daily for 30 days. 12/15/20 1/14/21  Gates Baumgarten, MD        Allergies:       Patient has no known allergies. Social History:     Tobacco: reports that he has been smoking cigarettes. He has a 15.00 pack-year smoking history. He has never used smokeless tobacco.  Alcohol:      reports no history of alcohol use. Drug Use:  reports current drug use. Drug: Marijuana. Family History:     Family History   Problem Relation Age of Onset    Diabetes Father        Review of Systems:         Review of Systems   Constitutional: Negative for activity change, appetite change, fatigue and unexpected weight change. HENT: Positive for hearing loss. Negative for congestion, ear discharge, ear pain and sore throat. Eyes: Negative for visual disturbance. Respiratory: Negative for cough, chest tightness, shortness of breath and wheezing. Cardiovascular: Negative for chest pain, palpitations and leg swelling. Gastrointestinal: Negative for abdominal pain, blood in stool, constipation and nausea. Endocrine: Negative for cold intolerance, heat intolerance, polydipsia and polyuria. Genitourinary: Negative for difficulty urinating and dysuria. Musculoskeletal: Positive for arthralgias, gait problem and myalgias. Skin: Negative for rash. Allergic/Immunologic: Negative. Neurological: Positive for numbness (feet). Negative for dizziness, weakness and headaches. Psychiatric/Behavioral: Positive for dysphoric mood. Negative for behavioral problems and suicidal ideas. The patient is not nervous/anxious. Physical Exam:     Vitals:  /68 (Site: Left Upper Arm, Position: Sitting, Cuff Size: Medium Adult)   Pulse 66   Temp 97 °F (36.1 °C)   Wt 212 lb (96.2 kg)   SpO2 96%   BMI 29.57 kg/m²       Physical Exam  Vitals signs reviewed. Constitutional:       General: He is not in acute distress.      Appearance: He is well-developed. Comments: Disheveled man, hard of hearing   HENT:      Head: Normocephalic and atraumatic. Right Ear: External ear normal.      Left Ear: External ear normal.      Nose: No congestion. Mouth/Throat:      Pharynx: Oropharynx is clear. Eyes:      General: No scleral icterus. Right eye: No discharge. Left eye: No discharge. Conjunctiva/sclera: Conjunctivae normal.   Neck:      Thyroid: No thyromegaly. Cardiovascular:      Rate and Rhythm: Normal rate and regular rhythm. Heart sounds: Normal heart sounds. No murmur. Pulmonary:      Effort: Pulmonary effort is normal.      Breath sounds: Normal breath sounds. No wheezing or rales. Abdominal:      General: Bowel sounds are normal. There is no distension. Palpations: Abdomen is soft. There is no mass. Tenderness: There is no abdominal tenderness. Musculoskeletal: Normal range of motion. Right lower leg: No edema. Left lower leg: No edema. Lymphadenopathy:      Cervical: No cervical adenopathy. Skin:     General: Skin is warm and dry. Coloration: Skin is not pale. Findings: No rash. Neurological:      Mental Status: He is alert and oriented to person, place, and time.       Coordination: Coordination abnormal.      Gait: Gait abnormal.   Psychiatric:         Mood and Affect: Mood normal.         Behavior: Behavior normal.               Data:     Lab Results   Component Value Date     03/02/2021    K 4.9 03/02/2021     03/02/2021    CO2 27 03/02/2021    BUN 53 03/02/2021    CREATININE 1.50 03/02/2021    GLUCOSE 328 03/02/2021    PROT 7.8 03/02/2021    LABALBU 4.1 03/02/2021    BILITOT 0.3 03/02/2021    ALKPHOS 80 03/02/2021    AST 14 03/02/2021    ALT 21 03/02/2021     Lab Results   Component Value Date    WBC 6.5 08/05/2020    RBC 4.15 08/05/2020    HGB 12.3 08/05/2020    HCT 37.5 08/05/2020    MCV 90.5 08/05/2020    MCH 29.8 08/05/2020    MCHC 32.9 08/05/2020 RDW 14.2 08/05/2020     08/05/2020    MPV NOT REPORTED 05/22/2020     No results found for: TSH  Lab Results   Component Value Date    CHOL 220 03/02/2021    HDL 44 03/02/2021    LABA1C 11.4 03/19/2021          Assessment & Plan        Diagnosis Orders   1. Type 2 diabetes mellitus with other circulatory complication, unspecified whether long term insulin use ([facility]   Patient reports being noncompliant with his diet and insulin therapy. He is supposed to take Basaglar insulin 20 units twice daily but apparently has been taking only once a day and not every day. I am not sure about his overall compliance with medications either. Difficult situation as the patient lives by himself and declined assisted living placement, declined home health care nurse assistance. We decided to switch his insulin to [place] 40 units daily to improve his compliance. Encouraged the patient to be more compliant. I believe that he will be safer at assisted living and expressed my concern to his sister Nithin Ricks. She states that she will talk to him and his brother to make a decision. POCT glycosylated hemoglobin (Hb A1C)    Insulin Degludec (TRESIBA FLEXTOUCH) 200 UNIT/ML SOPN   2. Essential hypertension   Blood pressure is controlled, continue on current medications metoprolol tartrate (LOPRESSOR) 25 MG tablet    amLODIPine (NORVASC) 10 MG tablet   3. Mixed hyperlipidemia   Lipid panel uncontrolled. Suspect due to noncompliance with medications. Continue on fenofibrate and lovastatin    4. PAD (peripheral artery disease) ([facility]   Continue medical management. Unfortunately continues to smoke and his diabetes is uncontrolled. Does not follow-up with his vascular surgeon regularly for checkup by his choice    5. Acquired absence of right foot ([facility]     6. Major depressive disorder in remission, unspecified whether recurrent ([facility]   Continue on Zoloft    7.  Chronic pain of both lower extremities   Most likely multifactorial including due to PAD and neuropathy. Currently on Lyrica but pain is not well controlled. Sister is asking for pain medications, however I do not feel comfortable prescribing narcotic pain meds due to his poor compliance with medications, high risk for falls, poor judgment etc    Recommended referral to pain management. Sister said that she will talk to him and try to make a decision                    Completed Refills   Requested Prescriptions     Signed Prescriptions Disp Refills    metoprolol tartrate (LOPRESSOR) 25 MG tablet 90 tablet 5     Sig: TAKE ONE-HALF TABLET BY MOUTH TWICE A DAY    amLODIPine (NORVASC) 10 MG tablet 30 tablet 5     Sig: TAKE 1 TABLET BY MOUTH EVERY DAY    Insulin Degludec (TRESIBA FLEXTOUCH) 200 UNIT/ML SOPN 1 pen 5     Sig: Inject 40 Units into the skin daily    glucose ([facility] GLUCOSE) 4 g chewable tablet 60 tablet 3     Sig: Take 4 tablets by mouth as needed for Low blood sugar     Return in about 3 months (around 6/19/2021) for diabetes. Orders Placed This Encounter   Medications    metoprolol tartrate (LOPRESSOR) 25 MG tablet     Sig: TAKE ONE-HALF TABLET BY MOUTH TWICE A DAY     Dispense:  90 tablet     Refill:  5    amLODIPine (NORVASC) 10 MG tablet     Sig: TAKE 1 TABLET BY MOUTH EVERY DAY     Dispense:  30 tablet     Refill:  5    Insulin Degludec (TRESIBA FLEXTOUCH) 200 UNIT/ML SOPN     Sig: Inject 40 Units into the skin daily     Dispense:  1 pen     Refill:  5    glucose ([facility] GLUCOSE) 4 g chewable tablet     Sig: Take 4 tablets by mouth as needed for Low blood sugar     Dispense:  60 tablet     Refill:  3     Orders Placed This Encounter   Procedures    POCT glycosylated hemoglobin (Hb A1C)         Patient Instructions   SURVEY:    You may be receiving a survey from [facility] regarding your visit today. Please complete the survey to enable us to provide the highest quality of care to you and your family.     If you cannot score us a very good on any question, please call the office to discuss how we could of made your experience a very good one. Thank you. You may be receiving a survey from [employer] regarding your visit today. You may get this in the mail, through your MyChart or in your email. Please complete the survey to enable us to provide the highest quality of care to you and your family. If you cannot score us as very good ( 5 Stars) on any question, please feel free to call the office to discuss how we could have made your experience exceptional.     Thank You!       MD Alison Baltazar, AMOS Ashton, PCA        Electronically signed by Parul Mackenzie MD on 3/19/2021 at 1:35 PM           Completed Refills      Requested Prescriptions     Signed Prescriptions Disp Refills    metoprolol tartrate (LOPRESSOR) 25 MG tablet 90 tablet 5     Sig: TAKE ONE-HALF TABLET BY MOUTH TWICE A DAY    amLODIPine (NORVASC) 10 MG tablet 30 tablet 5     Sig: TAKE 1 TABLET BY MOUTH EVERY DAY    Insulin Degludec (TRESIBA FLEXTOUCH) 200 UNIT/ML SOPN 1 pen 5     Sig: Inject 40 Units into the skin daily    glucose ([facility] GLUCOSE) 4 g chewable tablet 60 tablet 3     Sig: Take 4 tablets by mouth as needed for Low blood sugar

## 2021-04-16 ENCOUNTER — TELEPHONE (OUTPATIENT)
Dept: FAMILY MEDICINE CLINIC | Age: 66
End: 2021-04-16

## 2021-04-16 NOTE — TELEPHONE ENCOUNTER
Patient called requesting a letter sent to [address] [place] [address] explaining why he needs gas on. Patient stated he has med hx/ disability. Patient is aware provider will be informed and someone will  be reaching out to him. Patient voice understanding. Contact number # [phone]      # [phone]      Please advise,thank you       Last OV: 3/19/2021  Last RX:   Next scheduled apt: 6/18/2021      Health Maintenance   Topic Date Due    COVID-19 Vaccine (1) Never done    Shingles Vaccine (1 of 2) 05/22/2021 (Originally 2/22/2003)    AAA screen  05/22/2021 (Originally 1955)    Diabetic microalbuminuria test  06/03/2021 (Originally 3/4/2017)    Diabetic retinal exam  06/04/2021    A1C test (Diabetic or Prediabetic)  06/19/2021    Diabetic foot exam  09/17/2021    Annual Wellness Visit (AWV)  09/29/2021    Lipid screen  03/02/2022    Potassium monitoring  03/02/2022    Creatinine monitoring  03/02/2022    DTaP/Tdap/Td vaccine (2 - Td) 08/19/2025    Colon cancer screen colonoscopy  11/09/2026    Flu vaccine  Completed    Pneumococcal 65+ years Vaccine  Completed    Hepatitis C screen  Completed    Hepatitis A vaccine  Aged Out    Hib vaccine  Aged Out    Meningococcal (ACWY) vaccine  Aged Out             (applicable per patient's age: Cancer Screenings, Depression Screening, Fall Risk Screening, Immunizations)    Hemoglobin A1C (%)   Date Value   03/19/2021 11.4   12/15/2020 9.3   09/18/2020 9.8     Microalb/Crt.  Ratio (mcg/mg creat)   Date Value   03/04/2016 17 (H)     LDL Cholesterol (mg/dL)   Date Value   04/04/2019 54     LDL Calculated (mg/dL)   Date Value   03/02/2021 126     AST (U/L)   Date Value   03/02/2021 14     ALT (U/L)   Date Value   03/02/2021 21     BUN (mg/dL)   Date Value   03/02/2021 53      (goal A1C is < 7)   (goal LDL is <100) need 30-50% reduction from baseline     BP Readings from Last 3 Encounters:   03/19/21 110/68   01/15/21 138/75   12/15/20 127/68    (goal BP 120/80)      All Future Testing planned in [facility]:  Lab Frequency Next Occurrence       Next Visit Date:  Future Appointments   Date Time Provider Shira Mariam   6/18/2021 10:30 AM Dl Hardwick MD [facility]            Patient Active Problem List:     Deafness     PAD (peripheral artery disease) ([address]     Hypertension     Type 2 diabetes mellitus with circulatory disorder (HCC)     Closed nondisplaced basicervical fracture of right femur (HCC)     Diabetic peripheral neuropathy (HCC)     Mixed hyperlipidemia     Benign prostatic hyperplasia     Bilateral carotid artery occlusion     Osteomyelitis, multiple sites ([address]     Acquired absence of right foot (HCC)     Major depressive disorder in remission ([address]     Chronic pain of both lower extremities

## 2021-04-19 ENCOUNTER — TELEPHONE (OUTPATIENT)
Dept: FAMILY MEDICINE CLINIC | Age: 66
End: 2021-04-19

## 2021-04-19 NOTE — TELEPHONE ENCOUNTER
Pt is asking if you will do a letter stating he is disabled so that his [address] will turn his gas back on which controls the heat in his home? Health Maintenance   Topic Date Due    COVID-19 Vaccine (1) Never done    Shingles Vaccine (1 of 2) 05/22/2021 (Originally 2/22/2003)    AAA screen  05/22/2021 (Originally 1955)    Diabetic microalbuminuria test  06/03/2021 (Originally 3/4/2017)    Diabetic retinal exam  06/04/2021    A1C test (Diabetic or Prediabetic)  06/19/2021    Diabetic foot exam  09/17/2021    Annual Wellness Visit (AWV)  09/29/2021    Lipid screen  03/02/2022    Potassium monitoring  03/02/2022    Creatinine monitoring  03/02/2022    DTaP/Tdap/Td vaccine (2 - Td) 08/19/2025    Colon cancer screen colonoscopy  11/09/2026    Flu vaccine  Completed    Pneumococcal 65+ years Vaccine  Completed    Hepatitis C screen  Completed    Hepatitis A vaccine  Aged Out    Hib vaccine  Aged Out    Meningococcal (ACWY) vaccine  Aged Out             (applicable per patient's age: Cancer Screenings, Depression Screening, Fall Risk Screening, Immunizations)    Hemoglobin A1C (%)   Date Value   03/19/2021 11.4   12/15/2020 9.3   09/18/2020 9.8     Microalb/Crt.  Ratio (mcg/mg creat)   Date Value   03/04/2016 17 (H)     LDL Cholesterol (mg/dL)   Date Value   04/04/2019 54     LDL Calculated (mg/dL)   Date Value   03/02/2021 126     AST (U/L)   Date Value   03/02/2021 14     ALT (U/L)   Date Value   03/02/2021 21     BUN (mg/dL)   Date Value   03/02/2021 53      (goal A1C is < 7)   (goal LDL is <100) need 30-50% reduction from baseline     BP Readings from Last 3 Encounters:   03/19/21 110/68   01/15/21 138/75   12/15/20 127/68    (goal /80)      All Future Testing planned in [facility]:  Lab Frequency Next Occurrence       Next Visit Date:  Future Appointments   Date Time Provider Shira Becerra   6/18/2021 10:30 AM MD Anum Song [facility]            Patient Active Problem List:     Deafness     PAD (peripheral artery disease) ([facility]     Hypertension     Type 2 diabetes mellitus with circulatory disorder (HCC)     Closed nondisplaced basicervical fracture of right femur (HCC)     Diabetic peripheral neuropathy (HCC)     Mixed hyperlipidemia     Benign prostatic hyperplasia     Bilateral carotid artery occlusion     [facility]el[facility] sites [facility]     Acquired absence of right foot ([facility])     Major depressive disorder in remission ([facility])     Chronic pain of both lower extremities

## 2021-05-16 DIAGNOSIS — E78.2 MIXED HYPERLIPIDEMIA: ICD-10-CM

## 2021-05-17 RX ORDER — FENOFIBRATE 160 MG/1
TABLET ORAL
Qty: 30 TABLET | Refills: 2 | Status: SHIPPED | OUTPATIENT
Start: 2021-05-17 | End: 2021-09-17

## 2021-05-17 NOTE — TELEPHONE ENCOUNTER
Refill request  Last OV 3/19/2021  Last date RX filled 12/15/20  Next scheduled appt 6/18/2021  Medication pended    Health Maintenance   Topic Date Due    COVID-19 Vaccine (1) Never done    Shingles Vaccine (1 of 2) 05/22/2021 (Originally 2/22/2003)    AAA screen  05/22/2021 (Originally 1955)    Diabetic microalbuminuria test  06/03/2021 (Originally 3/4/2017)    Diabetic retinal exam  06/04/2021    A1C test (Diabetic or Prediabetic)  06/19/2021    Diabetic foot exam  09/17/2021    Annual Wellness Visit (AWV)  09/29/2021    Lipid screen  03/02/2022    Potassium monitoring  03/02/2022    Creatinine monitoring  03/02/2022    DTaP/Tdap/Td vaccine (2 - Td) 08/19/2025    Colon cancer screen colonoscopy  11/09/2026    Flu vaccine  Completed    Pneumococcal 65+ years Vaccine  Completed    Hepatitis C screen  Completed    Hepatitis A vaccine  Aged Out    Hib vaccine  Aged Out    Meningococcal (ACWY) vaccine  Aged Out             (applicable per patient's age: Cancer Screenings, Depression Screening, Fall Risk Screening, Immunizations)    Hemoglobin A1C (%)   Date Value   03/19/2021 11.4   12/15/2020 9.3   09/18/2020 9.8     Microalb/Crt.  Ratio (mcg/mg creat)   Date Value   03/04/2016 17 (H)     LDL Cholesterol (mg/dL)   Date Value   04/04/2019 54     LDL Calculated (mg/dL)   Date Value   03/02/2021 126     AST (U/L)   Date Value   03/02/2021 14     ALT (U/L)   Date Value   03/02/2021 21     BUN (mg/dL)   Date Value   03/02/2021 53      (goal A1C is < 7)   (goal LDL is <100) need 30-50% reduction from baseline     BP Readings from Last 3 Encounters:   03/19/21 110/68   01/15/21 138/75   12/15/20 127/68    (goal /80)      All Future Testing planned in [facility]:  Lab Frequency Next Occurrence       Next Visit Date:  Future Appointments   Date Time Provider Shira Becerra   6/18/2021 10:30 AM MD Jim Marquez [facility]            Patient Active Problem List:     Deafness     PAD (peripheral artery disease) ([facility] [address]     Hypertension     Type 2 diabetes mellitus with circulatory disorder (HCC)     Closed nondisplaced basicervical fracture of right femur (HCC)     Diabetic peripheral neuropathy (HCC)     Mixed hyperlipidemia     Benign prostatic hyperplasia     Bilateral carotid artery occlusion     Osteomyelitis, multiple sites ([facility] [address]     Acquired absence of right foot (HCC)     Major depressive disorder in remission ([facility])     Chronic pain of both lower extremities

## 2021-05-21 ENCOUNTER — TELEPHONE (OUTPATIENT)
Dept: FAMILY MEDICINE CLINIC | Age: 66
End: 2021-05-21

## 2021-05-25 DIAGNOSIS — I10 ESSENTIAL HYPERTENSION: ICD-10-CM

## 2021-05-25 RX ORDER — CLONIDINE HYDROCHLORIDE 0.1 MG/1
TABLET ORAL
Qty: 23 TABLET | Refills: 0 | Status: SHIPPED | OUTPATIENT
Start: 2021-05-25 | End: 2021-06-18 | Stop reason: SDUPTHER

## 2021-05-25 NOTE — TELEPHONE ENCOUNTER
Refill request  Last OV 3/19/2021  Last date RX filled 12/15/20  Next scheduled appt 6/18/2021  Medication pended    Health Maintenance   Topic Date Due    AAA screen  Never done    COVID-19 Vaccine (1) Never done    Shingles Vaccine (1 of 2) Never done    Diabetic retinal exam  06/04/2021    Diabetic microalbuminuria test  06/03/2021 (Originally 3/4/2017)    A1C test (Diabetic or Prediabetic)  06/19/2021    Diabetic foot exam  09/17/2021    Annual Wellness Visit (AWV)  09/29/2021    Lipid screen  03/02/2022    Potassium monitoring  03/02/2022    Creatinine monitoring  03/02/2022    DTaP/Tdap/Td vaccine (2 - Td) 08/19/2025    Colon cancer screen colonoscopy  11/09/2026    Flu vaccine  Completed    Pneumococcal 65+ years Vaccine  Completed    Hepatitis C screen  Completed    Hepatitis A vaccine  Aged Out    Hib vaccine  Aged Out    Meningococcal (ACWY) vaccine  Aged Out             (applicable per patient's age: Cancer Screenings, Depression Screening, Fall Risk Screening, Immunizations)    Hemoglobin A1C (%)   Date Value   03/19/2021 11.4   12/15/2020 9.3   09/18/2020 9.8     Microalb/Crt.  Ratio (mcg/mg creat)   Date Value   03/04/2016 17 (H)     LDL Cholesterol (mg/dL)   Date Value   04/04/2019 54     LDL Calculated (mg/dL)   Date Value   03/02/2021 126     AST (U/L)   Date Value   03/02/2021 14     ALT (U/L)   Date Value   03/02/2021 21     BUN (mg/dL)   Date Value   03/02/2021 53      (goal A1C is < 7)   (goal LDL is <100) need 30-50% reduction from baseline     BP Readings from Last 3 Encounters:   03/19/21 110/68   01/15/21 138/75   12/15/20 127/68    (goal /80)      All Future Testing planned in [facility]:  Lab Frequency Next Occurrence       Next Visit Date:  Future Appointments   Date Time Provider Shira Becerra   6/18/2021 10:30 AM Aisha Gardiner MD [facility]            Patient Active Problem List:     Deafness     PAD (peripheral artery disease) ([facility] [address] Hypertension     Type 2 diabetes mellitus with circulatory disorder (HCC)     Closed nondisplaced basicervical fracture of right femur (HCC)     Diabetic peripheral neuropathy (HCC)     Mixed hyperlipidemia     Benign prostatic hyperplasia     Bilateral carotid artery occlusion     Osteomyelitis, multiple sites ([facility] [address]     Acquired absence of right foot (HCC)     Major depressive disorder in remission (HCC)     Chronic pain of both lower extremities

## 2021-06-17 NOTE — PATIENT INSTRUCTIONS
SURVEY:    You may be receiving a survey from [employer] regarding your visit today. Please complete the survey to enable us to provide the highest quality of care to you and your family. If you cannot score us a very good on any question, please call the office to discuss how we could of made your experience a very good one. Thank you. You may be receiving a survey from [employer] regarding your visit today. You may get this in the mail, through your MyChart or in your email. Please complete the survey to enable us to provide the highest quality of care to you and your family. If you cannot score us as very good ( 5 Stars) on any question, please feel free to call the office to discuss how we could have made your experience exceptional.     Thank You!       MD Kolby Roblero

## 2021-06-18 ENCOUNTER — OFFICE VISIT (OUTPATIENT)
Dept: FAMILY MEDICINE CLINIC | Age: 66
End: 2021-06-18
Payer: MEDICARE

## 2021-06-18 VITALS
HEART RATE: 77 BPM | TEMPERATURE: 97.3 F | OXYGEN SATURATION: 95 % | BODY MASS INDEX: 28.15 KG/M2 | WEIGHT: 201.8 LBS | SYSTOLIC BLOOD PRESSURE: 120 MMHG | DIASTOLIC BLOOD PRESSURE: 70 MMHG

## 2021-06-18 DIAGNOSIS — M79.605 BILATERAL LEG PAIN: ICD-10-CM

## 2021-06-18 DIAGNOSIS — M79.604 BILATERAL LEG PAIN: ICD-10-CM

## 2021-06-18 DIAGNOSIS — F32.5 MAJOR DEPRESSIVE DISORDER IN REMISSION, UNSPECIFIED WHETHER RECURRENT (HCC): ICD-10-CM

## 2021-06-18 DIAGNOSIS — L97.911 ULCER OF RIGHT LOWER EXTREMITY, LIMITED TO BREAKDOWN OF SKIN (HCC): ICD-10-CM

## 2021-06-18 DIAGNOSIS — I10 ESSENTIAL HYPERTENSION: ICD-10-CM

## 2021-06-18 DIAGNOSIS — M79.89 LEG SWELLING: ICD-10-CM

## 2021-06-18 DIAGNOSIS — E11.42 DIABETIC PERIPHERAL NEUROPATHY (HCC): ICD-10-CM

## 2021-06-18 DIAGNOSIS — R10.13 EPIGASTRIC PAIN: ICD-10-CM

## 2021-06-18 DIAGNOSIS — E11.59 TYPE 2 DIABETES MELLITUS WITH OTHER CIRCULATORY COMPLICATION, UNSPECIFIED WHETHER LONG TERM INSULIN USE (HCC): Primary | ICD-10-CM

## 2021-06-18 PROBLEM — G89.18 POSTOPERATIVE PAIN: Status: ACTIVE | Noted: 2021-06-18

## 2021-06-18 PROBLEM — R10.9 ABDOMINAL PAIN: Status: ACTIVE | Noted: 2021-06-17

## 2021-06-18 PROBLEM — R63.4 WEIGHT LOSS: Status: ACTIVE | Noted: 2021-06-17

## 2021-06-18 PROBLEM — R11.2 NAUSEA AND VOMITING: Status: ACTIVE | Noted: 2021-06-17

## 2021-06-18 PROBLEM — M86.30 CHRONIC MULTIFOCAL OSTEOMYELITIS (HCC): Status: ACTIVE | Noted: 2018-07-23

## 2021-06-18 PROBLEM — S72.044A: Status: RESOLVED | Noted: 2017-03-31 | Resolved: 2021-06-18

## 2021-06-18 LAB — HBA1C MFR BLD: 8.2 %

## 2021-06-18 PROCEDURE — 1123F ACP DISCUSS/DSCN MKR DOCD: CPT | Performed by: INTERNAL MEDICINE

## 2021-06-18 PROCEDURE — 99214 OFFICE O/P EST MOD 30 MIN: CPT | Performed by: INTERNAL MEDICINE

## 2021-06-18 PROCEDURE — 2022F DILAT RTA XM EVC RTNOPTHY: CPT | Performed by: INTERNAL MEDICINE

## 2021-06-18 PROCEDURE — 83036 HEMOGLOBIN GLYCOSYLATED A1C: CPT | Performed by: INTERNAL MEDICINE

## 2021-06-18 PROCEDURE — 4004F PT TOBACCO SCREEN RCVD TLK: CPT | Performed by: INTERNAL MEDICINE

## 2021-06-18 PROCEDURE — 3017F COLORECTAL CA SCREEN DOC REV: CPT | Performed by: INTERNAL MEDICINE

## 2021-06-18 PROCEDURE — 3052F HG A1C>EQUAL 8.0%<EQUAL 9.0%: CPT | Performed by: INTERNAL MEDICINE

## 2021-06-18 PROCEDURE — 4040F PNEUMOC VAC/ADMIN/RCVD: CPT | Performed by: INTERNAL MEDICINE

## 2021-06-18 PROCEDURE — G8427 DOCREV CUR MEDS BY ELIG CLIN: HCPCS | Performed by: INTERNAL MEDICINE

## 2021-06-18 PROCEDURE — G8417 CALC BMI ABV UP PARAM F/U: HCPCS | Performed by: INTERNAL MEDICINE

## 2021-06-18 RX ORDER — CETIRIZINE HYDROCHLORIDE 10 MG/1
10 TABLET ORAL DAILY
Qty: 30 TABLET | Refills: 5 | Status: SHIPPED | OUTPATIENT
Start: 2021-06-18 | End: 2021-11-12 | Stop reason: SDUPTHER

## 2021-06-18 RX ORDER — FERROUS SULFATE 325(65) MG
1 TABLET ORAL 2 TIMES DAILY
Qty: 60 TABLET | Refills: 3 | Status: SHIPPED | OUTPATIENT
Start: 2021-06-18 | End: 2021-11-12 | Stop reason: SDUPTHER

## 2021-06-18 RX ORDER — SERTRALINE HYDROCHLORIDE 100 MG/1
100 TABLET, FILM COATED ORAL DAILY
Qty: 30 TABLET | Refills: 3 | Status: SHIPPED | OUTPATIENT
Start: 2021-06-18 | End: 2021-11-12 | Stop reason: SDUPTHER

## 2021-06-18 RX ORDER — PEN NEEDLE, DIABETIC 31 G X1/4"
NEEDLE, DISPOSABLE MISCELLANEOUS
COMMUNITY
Start: 2021-05-17

## 2021-06-18 RX ORDER — ZINC SULFATE 50(220)MG
220 CAPSULE ORAL DAILY
Qty: 30 CAPSULE | Refills: 0 | COMMUNITY
Start: 2021-06-18 | End: 2021-11-12 | Stop reason: SDUPTHER

## 2021-06-18 RX ORDER — PREGABALIN 100 MG/1
100 CAPSULE ORAL 3 TIMES DAILY
Qty: 90 CAPSULE | Refills: 1 | Status: SHIPPED | OUTPATIENT
Start: 2021-06-18 | End: 2021-07-18

## 2021-06-18 RX ORDER — OMEPRAZOLE 20 MG/1
CAPSULE, DELAYED RELEASE ORAL
COMMUNITY
Start: 2021-06-17 | End: 2021-07-26 | Stop reason: SDUPTHER

## 2021-06-18 RX ORDER — ONDANSETRON 4 MG/1
TABLET, FILM COATED ORAL
COMMUNITY
Start: 2021-06-17

## 2021-06-18 RX ORDER — INDOMETHACIN 50 MG/1
50 CAPSULE ORAL 3 TIMES DAILY PRN
Qty: 60 CAPSULE | Refills: 1 | Status: SHIPPED | OUTPATIENT
Start: 2021-06-18 | End: 2021-10-15 | Stop reason: SDUPTHER

## 2021-06-18 RX ORDER — FUROSEMIDE 40 MG/1
TABLET ORAL
Qty: 30 TABLET | Refills: 3 | Status: SHIPPED | OUTPATIENT
Start: 2021-06-18 | End: 2021-11-08

## 2021-06-18 RX ORDER — METOLAZONE 2.5 MG/1
2.5 TABLET ORAL DAILY
Qty: 30 TABLET | Refills: 3 | Status: SHIPPED | OUTPATIENT
Start: 2021-06-18 | End: 2021-11-08

## 2021-06-18 RX ORDER — DICYCLOMINE HCL 20 MG
TABLET ORAL
COMMUNITY
Start: 2021-05-23

## 2021-06-18 RX ORDER — PSEUDOEPHEDRINE HCL 30 MG
100 TABLET ORAL 2 TIMES DAILY
Qty: 60 CAPSULE | Refills: 5 | Status: SHIPPED | OUTPATIENT
Start: 2021-06-18

## 2021-06-18 RX ORDER — INSULIN DEGLUDEC 200 U/ML
45 INJECTION, SOLUTION SUBCUTANEOUS DAILY
Qty: 1 PEN | Refills: 5 | Status: SHIPPED | OUTPATIENT
Start: 2021-06-18 | End: 2021-11-12 | Stop reason: SDUPTHER

## 2021-06-18 RX ORDER — CLONIDINE HYDROCHLORIDE 0.1 MG/1
TABLET ORAL
Qty: 23 TABLET | Refills: 0 | Status: SHIPPED | OUTPATIENT
Start: 2021-06-18 | End: 2021-08-04

## 2021-06-18 RX ORDER — GLIPIZIDE 10 MG/1
TABLET, FILM COATED, EXTENDED RELEASE ORAL
Qty: 30 TABLET | Refills: 3 | Status: SHIPPED | OUTPATIENT
Start: 2021-06-18 | End: 2021-11-08

## 2021-06-18 RX ORDER — LISINOPRIL 2.5 MG/1
TABLET ORAL
Qty: 90 TABLET | Refills: 1 | Status: SHIPPED | OUTPATIENT
Start: 2021-06-18 | End: 2021-09-17

## 2021-06-18 RX ORDER — FAMOTIDINE 20 MG/1
TABLET, FILM COATED ORAL
COMMUNITY
Start: 2021-05-23 | End: 2021-07-26 | Stop reason: SDUPTHER

## 2021-06-18 SDOH — ECONOMIC STABILITY: FOOD INSECURITY: WITHIN THE PAST 12 MONTHS, YOU WORRIED THAT YOUR FOOD WOULD RUN OUT BEFORE YOU GOT MONEY TO BUY MORE.: NEVER TRUE

## 2021-06-18 SDOH — ECONOMIC STABILITY: FOOD INSECURITY: WITHIN THE PAST 12 MONTHS, THE FOOD YOU BOUGHT JUST DIDN'T LAST AND YOU DIDN'T HAVE MONEY TO GET MORE.: NEVER TRUE

## 2021-06-18 ASSESSMENT — ENCOUNTER SYMPTOMS
SINUS PRESSURE: 0
HEARTBURN: 0
SORE THROAT: 0
CHOKING: 0
RHINORRHEA: 0
NAUSEA: 1
ABDOMINAL PAIN: 1
ALLERGIC/IMMUNOLOGIC NEGATIVE: 1
BELCHING: 0
BLOOD IN STOOL: 0
COUGH: 1
DIARRHEA: 0
SHORTNESS OF BREATH: 0
CONSTIPATION: 0
WHEEZING: 0
VOMITING: 1

## 2021-06-18 ASSESSMENT — SOCIAL DETERMINANTS OF HEALTH (SDOH): HOW HARD IS IT FOR YOU TO PAY FOR THE VERY BASICS LIKE FOOD, HOUSING, MEDICAL CARE, AND HEATING?: SOMEWHAT HARD

## 2021-06-18 NOTE — PROGRESS NOTES
HPI Notes    Name: Jaya Carlin  : 1955         Chief Complaint:     Chief Complaint   Patient presents with    Diabetes     Patient presents today for 3 month check up    Cough     Patient has a cough that produces blood for the past couple days.  Other     Mouth and hands have been burning.  Gastroesophageal Reflux       History of Present Illness:        Yokasta Ignacio presents to office to follow-up for diabetes, hypertension,  GERD. He is accompanied by his sister-in-law Eugenia Mayer. Last HbA1C was 11.4% on 3/19/21. He was started on Tresiba 40 units . States has been taking every day  Stopped taking glipizide. Not sure if still taking Metformin. Does not check glucose readings at home. Has been having abdominal pain for the past 3-4 months. Had associated nausea and vomiting episodes. States stomach was burning. He went to [facility] emergency room and apparently was referred to GI for evaluation. He is supposed to have EGD by Dr. Tayla Cho on . Taking Pepcid and omeprazole. C/o burning in hands , feet, legs, fingertips. He thinks that symptoms are related to [place]. After reviewing his chart it became apparent that he ran out of Lyrica last February and did not call us for a refill so he has not been taking it. Patient has a history of diabetic neuropathy. Patient still lives by himself and his living conditions are poor. He continues to decline any help. Today he needs refills on multiple medications. States has some cough sometimes blood-tinged but not efraín blood. Reports no increased shortness of breath, wheezing, chest pain. Had no fevers or chills. Diabetes  He presents for his follow-up diabetic visit. He has type 2 diabetes mellitus. His disease course has been stable. Hypoglycemia symptoms include tremors. Pertinent negatives for hypoglycemia include no dizziness or nervousness/anxiousness.  Pertinent negatives for diabetes include no chest pain, no fatigue, no polydipsia, no polyuria, no weakness and no weight loss. There are no hypoglycemic complications. Diabetic complications include PVD. Pertinent negatives for diabetic complications include no CVA. Risk factors for coronary artery disease include diabetes mellitus, dyslipidemia, male sex and hypertension. Current diabetic treatment includes oral agent (monotherapy) and insulin injections. He is compliant with treatment most of the time. He is following a generally unhealthy diet. Home blood sugar record trend: Does not monitor glucose readings. An ACE inhibitor/angiotensin II receptor blocker is being taken. He does not see a podiatrist.Eye exam is not current. Gastroesophageal Reflux  He complains of abdominal pain, coughing and nausea. He reports no belching, no chest pain, no choking, no dysphagia, no heartburn, no sore throat or no wheezing. This is a new problem. The current episode started more than 1 month ago. The problem occurs frequently. The problem has been unchanged. Nothing aggravates the symptoms. Pertinent negatives include no fatigue, melena or weight loss. He has tried a PPI and a histamine-2 antagonist for the symptoms. The treatment provided mild relief. Hypertension  This is a chronic problem. The current episode started more than 1 year ago. The problem is controlled. Pertinent negatives include no chest pain, palpitations or shortness of breath. There are no associated agents to hypertension. Risk factors for coronary artery disease include diabetes mellitus, dyslipidemia, male gender, smoking/tobacco exposure and sedentary lifestyle. Past treatments include ACE inhibitors, calcium channel blockers, beta blockers, diuretics and alpha 1 blockers. The current treatment provides significant improvement. There are no compliance problems. Hypertensive end-organ damage includes PVD. There is no history of kidney disease, CAD/MI, CVA or heart failure.            Past Medical History: Past Medical History:   Diagnosis Date    Deafness 9/16/2011    Diabetes mellitus ([facility] [address] 9/16/2011    Dyslipidemia 9/16/2011    Hypertension 9/16/2011    MRSA infection 03/23/2018    foot and blood    PAD (peripheral artery disease) ([facility] [address] 9/16/2011    Sinusitis, chronic 9/16/2011    Type II or unspecified type diabetes mellitus without mention of complication, not stated as uncontrolled       Reviewed all health maintenance requirements and orderedappropriate tests  Health Maintenance Due   Topic Date Due    AAA screen  Never done    COVID-19 Vaccine (1) Never done    Shingles Vaccine (1 of 2) Never done    Diabetic microalbuminuria test  03/04/2017    Diabetic retinal exam  06/04/2021       Past Surgical History:     Past Surgical History:   Procedure Laterality Date    ARTERIOGRAM Right 8/18/2020    RIGHT ARM AORTO FEMORAL DIGITAL SUBTRACTION ARTERIOGRAPHY performed by Frank Barnes MD at [place] [id number]    INTERTROCHANTERIC HIP FRACTURE SURGERY Right     TOE AMPUTATION Right     All toes on right foot, 4th toe on left foot    VASCULAR SURGERY  10/2015    Right Carotid         Medications:       Prior to Admission medications    Medication Sig Start Date End Date Taking?  Authorizing Provider   dicyclomine (BENTYL) 20 MG tablet  5/23/21  Yes Historical Provider, MD   famotidine (PEPCID) 20 MG tablet  5/23/21  Yes Historical Provider, MD CRUZ PEN NEEDLES 31G X 6 MM [id number]  5/17/21  Yes Historical Provider, MD   omeprazole (PRILOSEC) 20 MG delayed release capsule  6/17/21  Yes Historical Provider, MD   ondansetron (ZOFRAN) 4 MG tablet  6/17/21  Yes Historical Provider, MD   cloNIDine (CATAPRES) 0.1 MG tablet TAKE ONE TABLET BY MOUTH DAILY 6/18/21  Yes Julito Nayak MD   metOLazone (ZAROXOLYN) 2.5 MG tablet Take 1 tablet by mouth daily 6/18/21  Yes Julito Nayak MD   ferrous sulfate (IRON 325) 325 (65 Fe) MG tablet Take 1 tablet by mouth 2 times daily 6/18/21  Yes Tenzin Ndiaye Anai Dewey MD   furosemide (LASIX) 40 MG tablet TAKE ONE TABLET BY MOUTH DAILY 6/18/21  Yes Lilia Pollard MD   cetirizine (ZYRTEC ALLERGY) 10 MG tablet Take 1 tablet by mouth daily 6/18/21  Yes Lilia Pollard MD   lisinopril (PRINIVIL;ZESTRIL) 2.5 MG tablet TAKE ONE TABLET BY MOUTH DAILY 6/18/21  Yes Lilia Pollard MD   docusate (COLACE, DULCOLAX) 100 MG CAPS Take 100 mg by mouth 2 times daily 6/18/21  Yes Lilia Pollard MD   glipiZIDE (GLUCOTROL XL) 10 MG extended release tablet TAKE ONE TABLET BY MOUTH DAILY 6/18/21  Yes Lilia Pollard MD   indomethacin (INDOCIN) 50 MG capsule Take 1 capsule by mouth 3 times daily as needed for Pain 6/18/21  Yes Lilia Pollard MD   metFORMIN (GLUCOPHAGE) 1000 MG tablet Take 1 tablet by mouth 2 times daily (with meals) 6/18/21  Yes Lilia Pollard MD   sertraline (ZOLOFT) 100 MG tablet Take 1 tablet by mouth daily 6/18/21  Yes Lilia Pollard MD   pregabalin (LYRICA) 100 MG capsule Take 1 capsule by mouth 3 times daily for 30 days.  6/18/21 7/18/21 Yes Lilia Pollard MD   zinc sulfate (ZINC-220) 220 (50 Zn) MG capsule Take 4 capsules by mouth daily 6/18/21  Yes Lilia Pollard MD   Insulin Degludec (TRESIBA FLEXTOUCH) 200 UNIT/ML SOPN Inject 45 Units into the skin daily 6/18/21  Yes Lilia Pollard MD   fenofibrate (TRIGLIDE) 160 MG tablet TAKE ONE TABLET BY MOUTH DAILY 5/17/21  Yes Lilia Pollard MD   metoprolol tartrate (LOPRESSOR) 25 MG tablet TAKE ONE-HALF TABLET BY MOUTH TWICE A DAY 3/19/21  Yes Lilia Pollard MD   amLODIPine (NORVASC) 10 MG tablet TAKE 1 TABLET BY MOUTH EVERY DAY 3/19/21  Yes Lilia Pollard MD   glucose [facility] GLUCOSE) 4 g chewable tablet Take 4 tablets by mouth as needed for Low blood sugar 3/19/21  Yes Lilia Pollard MD   clopidogrel (PLAVIX) 75 MG tablet TAKE ONE TABLET BY MOUTH DAILY 3/2/21  Yes Lilia Pollard MD   Vitamin D, Cholecalciferol, 25 MCG (1000 UT) CAPS Take 1,000 Units by mouth daily 3/2/21  Yes Lilia Pollard, MD   lovastatin (MEVACOR) 20 MG tablet TAKE ONE TABLET BY MOUTH DAILY 3/1/21  Yes Walter Durham MD   sertraline (ZOLOFT) 25 MG tablet  12/15/20  Yes Historical Provider, MD   blood glucose monitor strips Test 3 times a day & as needed for symptoms of irregular blood glucose. 12/15/20  Yes Walter Durham MD   blood glucose monitor kit and supplies Test 4-5 times a day & as needed for symptoms of irregular blood glucose. Please dispense supplies that are covered under pt's insurance 5/12/20  Yes Walter Durham MD   Lancets MISC 1 each by Does not apply route 4 times daily 5/12/20  Yes Walter Durham MD   aspirin 81 MG tablet Take 81 mg by mouth daily. Yes Historical Provider, MD        Allergies:       Patient has no known allergies. Social History:     Tobacco: reports that he has been smoking cigarettes. He has a 15.00 pack-year smoking history. He has never used smokeless tobacco.  Alcohol:      reports no history of alcohol use. Drug Use:  reports current drug use. Drug: Marijuana. Family History:     Family History   Problem Relation Age of Onset    Diabetes Father        Review of Systems:         Review of Systems   Constitutional: Negative for activity change, appetite change, chills, diaphoresis, fatigue, unexpected weight change and weight loss. HENT: Positive for hearing loss. Negative for ear discharge, mouth sores, rhinorrhea, sinus pressure and sore throat. Eyes: Negative for visual disturbance. Respiratory: Positive for cough. Negative for choking, shortness of breath and wheezing. Cardiovascular: Negative for chest pain, palpitations and leg swelling. Gastrointestinal: Positive for abdominal pain, nausea and vomiting. Negative for blood in stool, constipation, diarrhea, dysphagia, heartburn and melena. Endocrine: Negative for cold intolerance, heat intolerance, polydipsia and polyuria. Genitourinary: Negative for difficulty urinating and dysuria.    Musculoskeletal: Negative. Allergic/Immunologic: Negative. Neurological: Positive for tremors. Negative for dizziness, syncope and weakness. Psychiatric/Behavioral: Negative for behavioral problems, dysphoric mood and sleep disturbance. The patient is not nervous/anxious. Physical Exam:     Vitals:  /70   Pulse 77   Temp 97.3 °F (36.3 °C)   Wt 201 lb 12.8 oz (91.5 kg)   SpO2 95%   BMI 28.15 kg/m²       Physical Exam  Vitals reviewed. Constitutional:       General: He is not in acute distress. Appearance: He is well-developed. HENT:      Head: Normocephalic and atraumatic. Neck:      Thyroid: No thyromegaly. Cardiovascular:      Rate and Rhythm: Normal rate and regular rhythm. Heart sounds: Normal heart sounds. No murmur heard. Pulmonary:      Effort: Pulmonary effort is normal.      Breath sounds: Normal breath sounds. No wheezing or rales. Abdominal:      General: Bowel sounds are normal. There is no distension. Palpations: Abdomen is soft. There is no mass. Tenderness: There is no abdominal tenderness. Musculoskeletal:         General: Normal range of motion. Lymphadenopathy:      Cervical: No cervical adenopathy. Skin:     General: Skin is warm and dry. Findings: No rash. Neurological:      Mental Status: He is alert and oriented to person, place, and time.       Coordination: Coordination abnormal.      Gait: Gait abnormal.   Psychiatric:         Behavior: Behavior normal.         Judgment: Judgment normal.               Data:     Lab Results   Component Value Date     03/02/2021    K 4.9 03/02/2021     03/02/2021    CO2 27 03/02/2021    BUN 53 03/02/2021    CREATININE 1.50 03/02/2021    GLUCOSE 328 03/02/2021    PROT 7.8 03/02/2021    LABALBU 4.1 03/02/2021    BILITOT 0.3 03/02/2021    ALKPHOS 80 03/02/2021    AST 14 03/02/2021    ALT 21 03/02/2021     Lab Results   Component Value Date    WBC 6.5 08/05/2020    RBC 4.15 08/05/2020    HGB 12.3 08/05/2020    HCT 37.5 08/05/2020    MCV 90.5 08/05/2020    MCH 29.8 08/05/2020    MCHC 32.9 08/05/2020    RDW 14.2 08/05/2020     08/05/2020    MPV NOT REPORTED 05/22/2020     No results found for: TSH  Lab Results   Component Value Date    CHOL 220 03/02/2021    HDL 44 03/02/2021    LABA1C 8.2 06/18/2021          Assessment & Plan        Diagnosis Orders   1. Type 2 diabetes mellitus with other circulatory complication, unspecified whether long term insulin use (HCC)   Course is improving with hemoglobin A1c 8.2% today. Will increase Tresiba to 45 units daily, he is to continue on Metformin and I also advised that he continues on glipizide. Encouraged the patient to consider getting eye examination done. We will follow-up in 3 months glipiZIDE (GLUCOTROL XL) 10 MG extended release tablet    metFORMIN (GLUCOPHAGE) 1000 MG tablet    POCT glycosylated hemoglobin (Hb A1C)    TSH with Reflex    Insulin Degludec (TRESIBA FLEXTOUCH) 200 UNIT/ML SOPN   2. Essential hypertension   Blood pressure stable, continue on current meds. Will check magnesium and BNP level since he is on diuretics cloNIDine (CATAPRES) 0.1 MG tablet    furosemide (LASIX) 40 MG tablet    lisinopril (PRINIVIL;ZESTRIL) 2.5 MG tablet    Basic Metabolic Panel    Magnesium   3. Epigastric pain   Patient was evaluated by GI and expected to have EGD by Dr. Lisseth Baldwin on 6/22. Most likely his symptoms are related to GERD    4. Leg swelling   On diuretics metOLazone (ZAROXOLYN) 2.5 MG tablet    furosemide (LASIX) 40 MG tablet   5. Diabetic peripheral neuropathy ([facility] [address]   Patient ran out of Lyrica and now having burning pain in upper and lower extremities. Will resume her Lyrica. pregabalin (LYRICA) 100 MG capsule   6. Bilateral leg pain  indomethacin (INDOCIN) 50 MG capsule   7. Major depressive disorder in remission, unspecified whether recurrent (HCC)  sertraline (ZOLOFT) 100 MG tablet   8.  Ulcer of right lower extremity, limited to breakdown of skin (HCC)  zinc sulfate (ZINC-220) 220 (50 Zn) MG capsule                   Completed Refills   Requested Prescriptions     Signed Prescriptions Disp Refills    cloNIDine (CATAPRES) 0.1 MG tablet 23 tablet 0     Sig: TAKE ONE TABLET BY MOUTH DAILY    metOLazone (ZAROXOLYN) 2.5 MG tablet 30 tablet 3     Sig: Take 1 tablet by mouth daily    ferrous sulfate (IRON 325) 325 (65 Fe) MG tablet 60 tablet 3     Sig: Take 1 tablet by mouth 2 times daily    furosemide (LASIX) 40 MG tablet 30 tablet 3     Sig: TAKE ONE TABLET BY MOUTH DAILY    cetirizine (ZYRTEC ALLERGY) 10 MG tablet 30 tablet 5     Sig: Take 1 tablet by mouth daily    lisinopril (PRINIVIL;ZESTRIL) 2.5 MG tablet 90 tablet 1     Sig: TAKE ONE TABLET BY MOUTH DAILY    docusate (COLACE, DULCOLAX) 100 MG CAPS 60 capsule 5     Sig: Take 100 mg by mouth 2 times daily    glipiZIDE (GLUCOTROL XL) 10 MG extended release tablet 30 tablet 3     Sig: TAKE ONE TABLET BY MOUTH DAILY    indomethacin (INDOCIN) 50 MG capsule 60 capsule 1     Sig: Take 1 capsule by mouth 3 times daily as needed for Pain    metFORMIN (GLUCOPHAGE) 1000 MG tablet 60 tablet 5     Sig: Take 1 tablet by mouth 2 times daily (with meals)    sertraline (ZOLOFT) 100 MG tablet 30 tablet 3     Sig: Take 1 tablet by mouth daily    pregabalin (LYRICA) 100 MG capsule 90 capsule 1     Sig: Take 1 capsule by mouth 3 times daily for 30 days.  zinc sulfate (ZINC-220) 220 (50 Zn) MG capsule 30 capsule 0     Sig: Take 4 capsules by mouth daily    Insulin Degludec (TRESIBA FLEXTOUCH) 200 UNIT/ML SOPN 1 pen 5     Sig: Inject 45 Units into the skin daily     Return in about 3 months (around 9/18/2021) for diabetes, HTN, copd.      Orders Placed This Encounter   Medications    cloNIDine (CATAPRES) 0.1 MG tablet     Sig: TAKE ONE TABLET BY MOUTH DAILY     Dispense:  23 tablet     Refill:  0    metOLazone (ZAROXOLYN) 2.5 MG tablet     Sig: Take 1 tablet by mouth daily     Dispense:  30 tablet     Refill: 3    ferrous sulfate (IRON 325) 325 (65 Fe) MG tablet     Sig: Take 1 tablet by mouth 2 times daily     Dispense:  60 tablet     Refill:  3    furosemide (LASIX) 40 MG tablet     Sig: TAKE ONE TABLET BY MOUTH DAILY     Dispense:  30 tablet     Refill:  3    cetirizine (ZYRTEC ALLERGY) 10 MG tablet     Sig: Take 1 tablet by mouth daily     Dispense:  30 tablet     Refill:  5    lisinopril (PRINIVIL;ZESTRIL) 2.5 MG tablet     Sig: TAKE ONE TABLET BY MOUTH DAILY     Dispense:  90 tablet     Refill:  1    docusate (COLACE, DULCOLAX) 100 MG CAPS     Sig: Take 100 mg by mouth 2 times daily     Dispense:  60 capsule     Refill:  5    glipiZIDE (GLUCOTROL XL) 10 MG extended release tablet     Sig: TAKE ONE TABLET BY MOUTH DAILY     Dispense:  30 tablet     Refill:  3    indomethacin (INDOCIN) 50 MG capsule     Sig: Take 1 capsule by mouth 3 times daily as needed for Pain     Dispense:  60 capsule     Refill:  1    metFORMIN (GLUCOPHAGE) 1000 MG tablet     Sig: Take 1 tablet by mouth 2 times daily (with meals)     Dispense:  60 tablet     Refill:  5    sertraline (ZOLOFT) 100 MG tablet     Sig: Take 1 tablet by mouth daily     Dispense:  30 tablet     Refill:  3    pregabalin (LYRICA) 100 MG capsule     Sig: Take 1 capsule by mouth 3 times daily for 30 days.      Dispense:  90 capsule     Refill:  1    zinc sulfate (ZINC-220) 220 (50 Zn) MG capsule     Sig: Take 4 capsules by mouth daily     Dispense:  30 capsule     Refill:  0    Insulin Degludec (TRESIBA FLEXTOUCH) 200 UNIT/ML SOPN     Sig: Inject 45 Units into the skin daily     Dispense:  1 pen     Refill:  5     Orders Placed This Encounter   Procedures    Basic Metabolic Panel     Standing Status:   Future     Standing Expiration Date:   6/18/2022    Magnesium     Standing Status:   Future     Standing Expiration Date:   6/18/2022    TSH with Reflex     Standing Status:   Future     Standing Expiration Date:   6/18/2022    POCT glycosylated hemoglobin (Hb A1C)         Patient Instructions   SURVEY:    You may be receiving a survey from [employer] regarding your visit today. Please complete the survey to enable us to provide the highest quality of care to you and your family. If you cannot score us a very good on any question, please call the office to discuss how we could of made your experience a very good one. Thank you. You may be receiving a survey from [employer] regarding your visit today. You may get this in the mail, through your MyChart or in your email. Please complete the survey to enable us to provide the highest quality of care to you and your family. If you cannot score us as very good ( 5 Stars) on any question, please feel free to call the office to discuss how we could have made your experience exceptional.     Thank You!       MD Rosas Fay'      Electronically signed by Seymour Calabrese MD on 6/18/2021 at 12:54 PM           Completed Refills      Requested Prescriptions     Signed Prescriptions Disp Refills    cloNIDine (CATAPRES) 0.1 MG tablet 23 tablet 0     Sig: TAKE ONE TABLET BY MOUTH DAILY    metOLazone (ZAROXOLYN) 2.5 MG tablet 30 tablet 3     Sig: Take 1 tablet by mouth daily    ferrous sulfate (IRON 325) 325 (65 Fe) MG tablet 60 tablet 3     Sig: Take 1 tablet by mouth 2 times daily    furosemide (LASIX) 40 MG tablet 30 tablet 3     Sig: TAKE ONE TABLET BY MOUTH DAILY    cetirizine (ZYRTEC ALLERGY) 10 MG tablet 30 tablet 5     Sig: Take 1 tablet by mouth daily    lisinopril (PRINIVIL;ZESTRIL) 2.5 MG tablet 90 tablet 1     Sig: TAKE ONE TABLET BY MOUTH DAILY    docusate (COLACE, DULCOLAX) 100 MG CAPS 60 capsule 5     Sig: Take 100 mg by mouth 2 times daily    glipiZIDE (GLUCOTROL XL) 10 MG extended release tablet 30 tablet 3     Sig: TAKE ONE TABLET BY MOUTH DAILY    indomethacin (INDOCIN) 50 MG capsule 60 capsule 1     Sig: Take 1 capsule by mouth 3 times daily as needed for Pain    metFORMIN (GLUCOPHAGE) 1000 MG tablet 60 tablet 5     Sig: Take 1 tablet by mouth 2 times daily (with meals)    sertraline (ZOLOFT) 100 MG tablet 30 tablet 3     Sig: Take 1 tablet by mouth daily    pregabalin (LYRICA) 100 MG capsule 90 capsule 1     Sig: Take 1 capsule by mouth 3 times daily for 30 days.     zinc sulfate (ZINC-220) 220 (50 Zn) MG capsule 30 capsule 0     Sig: Take 4 capsules by mouth daily    Insulin Degludec (TRESIBA FLEXTOUCH) 200 UNIT/ML SOPN 1 pen 5     Sig: Inject 45 Units into the skin daily

## 2021-07-23 DIAGNOSIS — I73.9 PAD (PERIPHERAL ARTERY DISEASE) (HCC): ICD-10-CM

## 2021-07-23 RX ORDER — CLOPIDOGREL BISULFATE 75 MG/1
TABLET ORAL
Qty: 28 TABLET | Refills: 3 | Status: SHIPPED | OUTPATIENT
Start: 2021-07-23 | End: 2021-08-04

## 2021-07-23 RX ORDER — LOVASTATIN 20 MG/1
TABLET ORAL
Qty: 34 TABLET | Refills: 3 | Status: SHIPPED | OUTPATIENT
Start: 2021-07-23 | End: 2021-11-12 | Stop reason: SDUPTHER

## 2021-07-26 ENCOUNTER — OFFICE VISIT (OUTPATIENT)
Dept: FAMILY MEDICINE CLINIC | Age: 66
End: 2021-07-26
Payer: MEDICARE

## 2021-07-26 VITALS
TEMPERATURE: 97.3 F | OXYGEN SATURATION: 95 % | SYSTOLIC BLOOD PRESSURE: 130 MMHG | DIASTOLIC BLOOD PRESSURE: 82 MMHG | HEART RATE: 57 BPM | BODY MASS INDEX: 29.99 KG/M2 | WEIGHT: 215 LBS

## 2021-07-26 DIAGNOSIS — L03.119 CELLULITIS OF LOWER EXTREMITY, UNSPECIFIED LATERALITY: Primary | ICD-10-CM

## 2021-07-26 DIAGNOSIS — T14.8XXA MULTIPLE WOUNDS OF SKIN: ICD-10-CM

## 2021-07-26 DIAGNOSIS — K21.9 GASTROESOPHAGEAL REFLUX DISEASE WITHOUT ESOPHAGITIS: ICD-10-CM

## 2021-07-26 PROBLEM — J12.81: Status: ACTIVE | Noted: 2021-07-26

## 2021-07-26 PROCEDURE — G8417 CALC BMI ABV UP PARAM F/U: HCPCS | Performed by: INTERNAL MEDICINE

## 2021-07-26 PROCEDURE — 3017F COLORECTAL CA SCREEN DOC REV: CPT | Performed by: INTERNAL MEDICINE

## 2021-07-26 PROCEDURE — 99213 OFFICE O/P EST LOW 20 MIN: CPT | Performed by: INTERNAL MEDICINE

## 2021-07-26 PROCEDURE — 4040F PNEUMOC VAC/ADMIN/RCVD: CPT | Performed by: INTERNAL MEDICINE

## 2021-07-26 PROCEDURE — 1123F ACP DISCUSS/DSCN MKR DOCD: CPT | Performed by: INTERNAL MEDICINE

## 2021-07-26 PROCEDURE — G8427 DOCREV CUR MEDS BY ELIG CLIN: HCPCS | Performed by: INTERNAL MEDICINE

## 2021-07-26 PROCEDURE — 4004F PT TOBACCO SCREEN RCVD TLK: CPT | Performed by: INTERNAL MEDICINE

## 2021-07-26 RX ORDER — OMEPRAZOLE 20 MG/1
20 CAPSULE, DELAYED RELEASE ORAL DAILY
Qty: 30 CAPSULE | Refills: 5 | Status: SHIPPED | OUTPATIENT
Start: 2021-07-26 | End: 2021-11-12 | Stop reason: SDUPTHER

## 2021-07-26 RX ORDER — FAMOTIDINE 20 MG/1
20 TABLET, FILM COATED ORAL 2 TIMES DAILY
Qty: 60 TABLET | Refills: 5 | Status: SHIPPED | OUTPATIENT
Start: 2021-07-26 | End: 2021-11-12 | Stop reason: SDUPTHER

## 2021-07-26 RX ORDER — DOXYCYCLINE HYCLATE 100 MG/1
100 CAPSULE ORAL 2 TIMES DAILY
Qty: 14 CAPSULE | Refills: 0 | Status: SHIPPED | OUTPATIENT
Start: 2021-07-26 | End: 2021-08-02

## 2021-07-26 ASSESSMENT — ENCOUNTER SYMPTOMS
SHORTNESS OF BREATH: 0
ABDOMINAL PAIN: 0
NAUSEA: 0
SORE THROAT: 0
COUGH: 0
CONSTIPATION: 0
ALLERGIC/IMMUNOLOGIC NEGATIVE: 1
WHEEZING: 0
BLOOD IN STOOL: 0

## 2021-07-26 NOTE — PATIENT INSTRUCTIONS
SURVEY:    You may be receiving a survey from [employer] regarding your visit today. Please complete the survey to enable us to provide the highest quality of care to you and your family. If you cannot score us a very good on any question, please call the office to discuss how we could of made your experience a very good one. Thank you. You may be receiving a survey from [employer] regarding your visit today. You may get this in the mail, through your MyChart or in your email. Please complete the survey to enable us to provide the highest quality of care to you and your family. If you cannot score us as very good ( 5 Stars) on any question, please feel free to call the office to discuss how we could have made your experience exceptional.     Thank You!       MD Jake Wick [facility]

## 2021-07-26 NOTE — PROGRESS NOTES
HPI Notes    Name: Frankey Drones  : 1955         Chief Complaint:     Chief Complaint   Patient presents with    Cellulitis     Patient present today with sores and pus on both legs. Sometimes both legs swell and produce \"bubbles\" which burst and cause the sores. Not sure how long they've been present, they were discovered 2 weeks ago after he fell and cut a toe. History of Present Illness:        Mr. Srinivas Montalvo presents to office for evaluation of redness in lower extremities and multiple superficial wounds . He is accompanied by his sister-in -law Ba Turner who states that she noticed multiple superficial skin lesion, blisters and redness on pt's lower extremities. According to Ba Turner, the problem started about one week ago. Blisters ruptured and turned into shallow wounds. Then skin around wounds turned red. Pt had no associated fever, chills, nausea, vomiting  They attempted to treat with topical OTC anbx but it did not work. Pt with h/o uncontrolled DM and severe PAD.            Past Medical History:     Past Medical History:   Diagnosis Date    Deafness 2011    Diabetes mellitus ([facility] [address] 2011    Dyslipidemia 2011    Hypertension 2011    MRSA infection 2018    foot and blood    PAD (peripheral artery disease) ([facility] [address] 2011    Sinusitis, chronic 2011    Type II or unspecified type diabetes mellitus without mention of complication, not stated as uncontrolled       Reviewed all health maintenance requirements and orderedappropriate tests  Health Maintenance Due   Topic Date Due    AAA screen  Never done    COVID-19 Vaccine (1) Never done    Shingles Vaccine (1 of 2) Never done    Diabetic microalbuminuria test  2017    Diabetic retinal exam  2021       Past Surgical History:     Past Surgical History:   Procedure Laterality Date    ARTERIOGRAM Right 2020    RIGHT ARM AORTO FEMORAL DIGITAL SUBTRACTION ARTERIOGRAPHY performed by Lizzie Sarah Raven Asher MD at [facility]  11-98    INTERTROCHANTERIC HIP FRACTURE SURGERY Right     TOE AMPUTATION Right     All toes on right foot, 4th toe on left foot    VASCULAR SURGERY  10/2015    Right Carotid         Medications:       Prior to Admission medications    Medication Sig Start Date End Date Taking?  Authorizing Provider   insulin lispro (HUMALOG) 100 UNIT/ML injection vial    Yes Historical Provider, MD   doxycycline hyclate (VIBRAMYCIN) 100 MG capsule Take 1 capsule by mouth 2 times daily for 7 days 7/26/21 8/2/21 Yes Cynthia Garcia MD   famotidine (PEPCID) 20 MG tablet Take 1 tablet by mouth 2 times daily 7/26/21  Yes Cynthia Garcia MD   omeprazole (PRILOSEC) 20 MG delayed release capsule Take 1 capsule by mouth Daily 7/26/21  Yes Cynthia Garcia MD   lovastatin (MEVACOR) 20 MG tablet TAKE ONE TABLET BY MOUTH DAILY 7/23/21  Yes Cynthia Garcia MD   clopidogrel (PLAVIX) 75 MG tablet TAKE ONE TABLET BY MOUTH DAILY 7/23/21  Yes Cynthia Garcia MD   dicyclomine (BENTYL) 20 MG tablet  5/23/21  Yes Historical Provider, MD Ashlee Chang PEN NEEDLES 31G X 6 MM [address] [facility]  5/17/21  Yes Historical Provider, MD   ondansetron (ZOFRAN) 4 MG tablet  6/17/21  Yes Historical Provider, MD   cloNIDine (CATAPRES) 0.1 MG tablet TAKE ONE TABLET BY MOUTH DAILY 6/18/21  Yes Cynthia Garcia MD   metOLazone (ZAROXOLYN) 2.5 MG tablet Take 1 tablet by mouth daily 6/18/21  Yes Cynthia Garcia MD   ferrous sulfate (IRON 325) 325 (65 Fe) MG tablet Take 1 tablet by mouth 2 times daily 6/18/21  Yes Cynthia Garcia MD   furosemide (LASIX) 40 MG tablet TAKE ONE TABLET BY MOUTH DAILY 6/18/21  Yes Cynthia Garcia MD   cetirizine (ZYRTEC ALLERGY) 10 MG tablet Take 1 tablet by mouth daily 6/18/21  Yes Cynthia Garcia MD   lisinopril (PRINIVIL;ZESTRIL) 2.5 MG tablet TAKE ONE TABLET BY MOUTH DAILY 6/18/21  Yes Cynthia Garcia MD   docusate (COLACE, DULCOLAX) 100 MG CAPS Take 100 mg by mouth 2 times daily 6/18/21  Yes Taras De Santiago capsule by mouth 3 times daily for 30 days. 6/18/21 7/18/21  Cynthia Garcia MD        Allergies:       Patient has no known allergies. Social History:     Tobacco: reports that he has been smoking cigarettes. He has a 15.00 pack-year smoking history. He has never used smokeless tobacco.  Alcohol:      reports no history of alcohol use. Drug Use:  reports current drug use. Drug: Marijuana. Family History:     Family History   Problem Relation Age of Onset    Diabetes Father        Review of Systems:         Review of Systems   Constitutional: Negative for activity change, appetite change and unexpected weight change. HENT: Negative for congestion, ear discharge, ear pain and sore throat. Eyes: Negative for visual disturbance. Respiratory: Negative for cough, shortness of breath and wheezing. Cardiovascular: Negative for chest pain, palpitations and leg swelling. Gastrointestinal: Negative for abdominal pain, blood in stool, constipation and nausea. Endocrine: Negative for cold intolerance, heat intolerance, polydipsia and polyuria. Genitourinary: Negative for difficulty urinating and dysuria. Musculoskeletal: Negative. Skin: Positive for rash and wound. Allergic/Immunologic: Negative. Neurological: Negative for weakness and headaches. Psychiatric/Behavioral: Negative for behavioral problems and dysphoric mood. The patient is not nervous/anxious. Physical Exam:     Vitals:  /82   Pulse 57   Temp 97.3 °F (36.3 °C)   Wt 215 lb (97.5 kg)   SpO2 95%   BMI 29.99 kg/m²       Physical Exam  Vitals reviewed. Constitutional:       General: He is not in acute distress. Appearance: He is well-developed. HENT:      Head: Normocephalic and atraumatic. Neck:      Thyroid: No thyromegaly. Cardiovascular:      Rate and Rhythm: Normal rate and regular rhythm. Heart sounds: Normal heart sounds. No murmur heard.      Pulmonary:      Effort: Pulmonary effort is normal.      Breath sounds: Normal breath sounds. No wheezing or rales. Abdominal:      General: Bowel sounds are normal. There is no distension. Palpations: Abdomen is soft. There is no mass. Tenderness: There is no abdominal tenderness. Musculoskeletal:         General: No tenderness. Normal range of motion. Right lower leg: Edema present. Left lower leg: Edema present. Lymphadenopathy:      Cervical: No cervical adenopathy. Skin:     General: Skin is warm and dry. Findings: No rash. Comments: LE with multiple shallow ulcer type lesions, some with yellowish base. Skin is erythematous on lower third of both legs, no draining areas. Neurological:      Mental Status: He is alert. Psychiatric:         Mood and Affect: Mood normal.         Behavior: Behavior normal.         Thought Content: Thought content normal.               Data:     Lab Results   Component Value Date     03/02/2021    K 4.9 03/02/2021     03/02/2021    CO2 27 03/02/2021    BUN 53 03/02/2021    CREATININE 1.50 03/02/2021    GLUCOSE 328 03/02/2021    PROT 7.8 03/02/2021    LABALBU 4.1 03/02/2021    BILITOT 0.3 03/02/2021    ALKPHOS 80 03/02/2021    AST 14 03/02/2021    ALT 21 03/02/2021     Lab Results   Component Value Date    WBC 6.5 08/05/2020    RBC 4.15 08/05/2020    HGB 12.3 08/05/2020    HCT 37.5 08/05/2020    MCV 90.5 08/05/2020    MCH 29.8 08/05/2020    MCHC 32.9 08/05/2020    RDW 14.2 08/05/2020     08/05/2020    MPV NOT REPORTED 05/22/2020     No results found for: TSH  Lab Results   Component Value Date    CHOL 220 03/02/2021    HDL 44 03/02/2021    LABA1C 8.2 06/18/2021          Assessment & Plan        Diagnosis Orders   1. Cellulitis of lower extremity, unspecified laterality  doxycycline hyclate (VIBRAMYCIN) 100 MG capsule   2. Multiple wounds of skin  doxycycline hyclate (VIBRAMYCIN) 100 MG capsule   3.  Gastroesophageal reflux disease without esophagitis  famotidine (PEPCID) 20 MG tablet    omeprazole (PRILOSEC) 20 MG delayed release capsule      Prescribed Doxycycline course, clean wounds with mild soap and warm water, keep dry. Advised to go to ER if worse. Follow up in 2 weeks . Completed Refills   Requested Prescriptions     Signed Prescriptions Disp Refills    doxycycline hyclate (VIBRAMYCIN) 100 MG capsule 14 capsule 0     Sig: Take 1 capsule by mouth 2 times daily for 7 days    famotidine (PEPCID) 20 MG tablet 60 tablet 5     Sig: Take 1 tablet by mouth 2 times daily    omeprazole (PRILOSEC) 20 MG delayed release capsule 30 capsule 5     Sig: Take 1 capsule by mouth Daily     Return in about 2 weeks (around 8/9/2021). Orders Placed This Encounter   Medications    doxycycline hyclate (VIBRAMYCIN) 100 MG capsule     Sig: Take 1 capsule by mouth 2 times daily for 7 days     Dispense:  14 capsule     Refill:  0    famotidine (PEPCID) 20 MG tablet     Sig: Take 1 tablet by mouth 2 times daily     Dispense:  60 tablet     Refill:  5    omeprazole (PRILOSEC) 20 MG delayed release capsule     Sig: Take 1 capsule by mouth Daily     Dispense:  30 capsule     Refill:  5     No orders of the defined types were placed in this encounter. Patient Instructions   SURVEY:    You may be receiving a survey from [employer] regarding your visit today. Please complete the survey to enable us to provide the highest quality of care to you and your family. If you cannot score us a very good on any question, please call the office to discuss how we could of made your experience a very good one. Thank you. You may be receiving a survey from [employer] regarding your visit today. You may get this in the mail, through your MyChart or in your email. Please complete the survey to enable us to provide the highest quality of care to you and your family.     If you cannot score us as very good ( 5 Stars) on any question, please feel free to call the office to discuss how we could have made your experience exceptional.     Thank You!       Marylee Arias, MD Maryelizabeth Lose RMA Corbin Dama RMA'      Electronically signed by Marylee Arias, MD on 7/26/2021 at 8:05 PM           Completed Refills      Requested Prescriptions     Signed Prescriptions Disp Refills    doxycycline hyclate (VIBRAMYCIN) 100 MG capsule 14 capsule 0     Sig: Take 1 capsule by mouth 2 times daily for 7 days    famotidine (PEPCID) 20 MG tablet 60 tablet 5     Sig: Take 1 tablet by mouth 2 times daily    omeprazole (PRILOSEC) 20 MG delayed release capsule 30 capsule 5     Sig: Take 1 capsule by mouth Daily

## 2021-08-04 DIAGNOSIS — I73.9 PAD (PERIPHERAL ARTERY DISEASE) (HCC): ICD-10-CM

## 2021-08-04 DIAGNOSIS — I10 ESSENTIAL HYPERTENSION: ICD-10-CM

## 2021-08-04 RX ORDER — CLONIDINE HYDROCHLORIDE 0.1 MG/1
TABLET ORAL
Qty: 23 TABLET | Refills: 0 | Status: SHIPPED | OUTPATIENT
Start: 2021-08-04 | End: 2021-09-07

## 2021-08-04 RX ORDER — CLOPIDOGREL BISULFATE 75 MG/1
TABLET ORAL
Qty: 28 TABLET | Refills: 3 | Status: SHIPPED | OUTPATIENT
Start: 2021-08-04

## 2021-08-04 NOTE — TELEPHONE ENCOUNTER
Refill request  Last OV 7/26/2021  Last date RX written 6/18/21  Next scheduled appt 8/9/2021  Medication pended    Health Maintenance   Topic Date Due    AAA screen  Never done    COVID-19 Vaccine (1) Never done    Shingles Vaccine (1 of 2) Never done    Diabetic microalbuminuria test  03/04/2017    Diabetic retinal exam  06/04/2021    Flu vaccine (1) 09/01/2021    Diabetic foot exam  09/17/2021    Annual Wellness Visit (AWV)  09/29/2021    Lipid screen  03/02/2022    Potassium monitoring  03/02/2022    Creatinine monitoring  03/02/2022    A1C test (Diabetic or Prediabetic)  06/18/2022    DTaP/Tdap/Td vaccine (2 - Td or Tdap) 08/19/2025    Colon cancer screen colonoscopy  11/09/2026    Pneumococcal 65+ years Vaccine  Completed    Hepatitis C screen  Completed    Hepatitis A vaccine  Aged Out    Hib vaccine  Aged Out    Meningococcal (ACWY) vaccine  Aged Out             (applicable per patient's age: Cancer Screenings, Depression Screening, Fall Risk Screening, Immunizations)    Hemoglobin A1C (%)   Date Value   06/18/2021 8.2   03/19/2021 11.4   12/15/2020 9.3     Microalb/Crt.  Ratio (mcg/mg creat)   Date Value   03/04/2016 17 (H)     LDL Cholesterol (mg/dL)   Date Value   04/04/2019 54     LDL Calculated (mg/dL)   Date Value   03/02/2021 126     AST (U/L)   Date Value   03/02/2021 14     ALT (U/L)   Date Value   03/02/2021 21     BUN (mg/dL)   Date Value   03/02/2021 53      (goal A1C is < 7)   (goal LDL is <100) need 30-50% reduction from baseline     BP Readings from Last 3 Encounters:   07/26/21 130/82   06/18/21 120/70   03/19/21 110/68    (goal /80)      All Future Testing planned in [facility]:  Lab Frequency Next Occurrence   Basic Metabolic Panel Once 63/13/0773   Magnesium Once 06/18/2022   TSH with Reflex Once 06/18/2022       Next Visit Date:  Future Appointments   Date Time Provider Shira Becerra   8/9/2021  1:00 PM MD [facility]   9/20/2021 11:00 AM Alka Obregon MD [facility]            Patient Active Problem List:     Deafness     PAD (peripheral artery disease) ([facility] [address]     Hypertension     Type 2 diabetes mellitus with circulatory disorder (HCC)     Diabetic peripheral neuropathy (HCC)     Mixed hyperlipidemia     Benign prostatic hyperplasia     Bilateral carotid artery occlusion     Osteomyelitis, multiple sites (HCC)     Acquired absence of right foot (HCC)     Major depressive disorder in remission (HCC)     Chronic pain of both lower extremities     Abdominal pain     Anemia     Chronic multifocal osteomyelitis (HCC)     Nausea and vomiting     DVT prophylaxis     Postoperative pain     Protein-calorie malnutrition (HCC)     Weight loss     Severe acute respiratory syndrome (SARS)

## 2021-09-04 DIAGNOSIS — I10 ESSENTIAL HYPERTENSION: ICD-10-CM

## 2021-09-07 RX ORDER — CLONIDINE HYDROCHLORIDE 0.1 MG/1
TABLET ORAL
Qty: 23 TABLET | Refills: 0 | Status: SHIPPED | OUTPATIENT
Start: 2021-09-07 | End: 2021-09-17

## 2021-09-17 DIAGNOSIS — E78.2 MIXED HYPERLIPIDEMIA: ICD-10-CM

## 2021-09-17 DIAGNOSIS — I10 ESSENTIAL HYPERTENSION: ICD-10-CM

## 2021-09-17 RX ORDER — CLONIDINE HYDROCHLORIDE 0.1 MG/1
TABLET ORAL
Qty: 23 TABLET | Refills: 0 | Status: SHIPPED
Start: 2021-09-17 | End: 2021-10-15 | Stop reason: SINTOL

## 2021-09-17 RX ORDER — LISINOPRIL 2.5 MG/1
TABLET ORAL
Qty: 90 TABLET | Refills: 1 | Status: SHIPPED | OUTPATIENT
Start: 2021-09-17

## 2021-09-17 RX ORDER — FENOFIBRATE 160 MG/1
TABLET ORAL
Qty: 90 TABLET | Refills: 1 | Status: SHIPPED | OUTPATIENT
Start: 2021-09-17

## 2021-09-17 NOTE — TELEPHONE ENCOUNTER
Active Problem List:     Deafness     PAD (peripheral artery disease) (HCC)     Hypertension     Type 2 diabetes mellitus with circulatory disorder (HCC)     Diabetic peripheral neuropathy (HCC)     Mixed hyperlipidemia     Benign prostatic hyperplasia     Bilateral carotid artery occlusion     Osteomyelitis, multiple sites (HCC)     Acquired absence of right foot (HCC)     Major depressive disorder in remission (HCC)     Chronic pain of both lower extremities     Abdominal pain     Anemia     Chronic multifocal osteomyelitis (HCC)     Nausea and vomiting     DVT prophylaxis     Postoperative pain     Protein-calorie malnutrition (HCC)     Weight loss     Severe acute respiratory syndrome (SARS)

## 2021-10-15 ENCOUNTER — OFFICE VISIT (OUTPATIENT)
Dept: FAMILY MEDICINE CLINIC | Age: 66
End: 2021-10-15
Payer: MEDICARE

## 2021-10-15 VITALS
HEIGHT: 71 IN | OXYGEN SATURATION: 97 % | SYSTOLIC BLOOD PRESSURE: 132 MMHG | HEART RATE: 70 BPM | RESPIRATION RATE: 18 BRPM | DIASTOLIC BLOOD PRESSURE: 78 MMHG | BODY MASS INDEX: 27.89 KG/M2 | WEIGHT: 199.2 LBS | TEMPERATURE: 98.6 F

## 2021-10-15 DIAGNOSIS — M79.604 BILATERAL LEG PAIN: ICD-10-CM

## 2021-10-15 DIAGNOSIS — E11.42 DIABETIC PERIPHERAL NEUROPATHY (HCC): Primary | ICD-10-CM

## 2021-10-15 DIAGNOSIS — R29.6 RECURRENT FALLS WHILE WALKING: ICD-10-CM

## 2021-10-15 DIAGNOSIS — Z89.431 ACQUIRED ABSENCE OF RIGHT FOOT (HCC): ICD-10-CM

## 2021-10-15 DIAGNOSIS — M79.605 BILATERAL LEG PAIN: ICD-10-CM

## 2021-10-15 DIAGNOSIS — I73.9 PAD (PERIPHERAL ARTERY DISEASE) (HCC): ICD-10-CM

## 2021-10-15 PROBLEM — R11.2 NAUSEA AND VOMITING: Status: RESOLVED | Noted: 2021-06-17 | Resolved: 2021-10-15

## 2021-10-15 PROBLEM — R10.9 ABDOMINAL PAIN: Status: RESOLVED | Noted: 2021-06-17 | Resolved: 2021-10-15

## 2021-10-15 PROBLEM — J12.81: Status: RESOLVED | Noted: 2021-07-26 | Resolved: 2021-10-15

## 2021-10-15 PROCEDURE — 3052F HG A1C>EQUAL 8.0%<EQUAL 9.0%: CPT | Performed by: INTERNAL MEDICINE

## 2021-10-15 PROCEDURE — G8484 FLU IMMUNIZE NO ADMIN: HCPCS | Performed by: INTERNAL MEDICINE

## 2021-10-15 PROCEDURE — 3017F COLORECTAL CA SCREEN DOC REV: CPT | Performed by: INTERNAL MEDICINE

## 2021-10-15 PROCEDURE — G8417 CALC BMI ABV UP PARAM F/U: HCPCS | Performed by: INTERNAL MEDICINE

## 2021-10-15 PROCEDURE — 4040F PNEUMOC VAC/ADMIN/RCVD: CPT | Performed by: INTERNAL MEDICINE

## 2021-10-15 PROCEDURE — 99213 OFFICE O/P EST LOW 20 MIN: CPT | Performed by: INTERNAL MEDICINE

## 2021-10-15 PROCEDURE — G8427 DOCREV CUR MEDS BY ELIG CLIN: HCPCS | Performed by: INTERNAL MEDICINE

## 2021-10-15 PROCEDURE — 1123F ACP DISCUSS/DSCN MKR DOCD: CPT | Performed by: INTERNAL MEDICINE

## 2021-10-15 PROCEDURE — 2022F DILAT RTA XM EVC RTNOPTHY: CPT | Performed by: INTERNAL MEDICINE

## 2021-10-15 PROCEDURE — 4004F PT TOBACCO SCREEN RCVD TLK: CPT | Performed by: INTERNAL MEDICINE

## 2021-10-15 RX ORDER — INDOMETHACIN 50 MG/1
50 CAPSULE ORAL 3 TIMES DAILY PRN
Qty: 60 CAPSULE | Refills: 1 | Status: SHIPPED | OUTPATIENT
Start: 2021-10-15

## 2021-10-15 ASSESSMENT — ENCOUNTER SYMPTOMS
ABDOMINAL PAIN: 0
ALLERGIC/IMMUNOLOGIC NEGATIVE: 1
NAUSEA: 0
CHEST TIGHTNESS: 0
CONSTIPATION: 0
SHORTNESS OF BREATH: 0
WHEEZING: 0
SORE THROAT: 0
BLOOD IN STOOL: 0
COUGH: 0

## 2021-10-15 NOTE — PROGRESS NOTES
Jamshid Notes    Name: Brennan Knowles  : 1955         Chief Complaint:     Chief Complaint   Patient presents with    Other     Face to Face for wheelchair. Not taking Clonidine due to making him \"whoozy\"        History of Present Illness:        Ajit Valentin presents to office for face-to-face evaluation for a power wheelchair. He also c/o pain in legs and would like to be referred to a pain doctor. He is accompanied by his sister- in- law Caridad Craven who is concerned about his functional status and  safety at home. She states that Kamar Frye has been falling a lot at home. He \" lives constantly in pain\" due to severe neuropathy in BL LEs and PAD. Kamar Frye has a h/o severe PAD, s/p partial amputation right foot, s/p 4th toe amputation left foot. S/p Left common femoral to posterior tibial artery bypass with reverse saphenous vein and left common femoral endarterectomy and profundaplasty on 9/22/15. Since then had recurrent RLE nonhealing ulcers, cellulitis resulting in need for hospitalizations. Kamar Frye has a h/o severe neuropathy in feet/legs. Tried Gabapentin, NSAIDs without improvement in pain. Presently on Lyrica and pain still not under desired control. Reports frequent falls at home due to the pain and problems with mobility. Using a walker but his gait is still unsteady resulting in falls. States the walker is \" old and wobbly\" and needs Rx for a new walker. Lives alone in a one story house. Brother and sister-in - law are very involved with his care. Needs help with MRADLs in house including bathing, feeding, grooming, cooking and cleaning, grocery shopping.                   Past Medical History:     Past Medical History:   Diagnosis Date    Deafness 2011    Diabetes mellitus ([facility] [address] 2011    Dyslipidemia 2011    Hypertension 2011    MRSA infection 2018    foot and blood    PAD (peripheral artery disease) ([facility] [address] 2011    Sinusitis, chronic 2011    Type II or unspecified type diabetes mellitus without mention of complication, not stated as uncontrolled       Reviewed all health maintenance requirements and orderedappropriate tests  Health Maintenance Due   Topic Date Due    AAA screen  Never done    COVID-19 Vaccine (1) Never done    Shingles Vaccine (1 of 2) Never done    Diabetic microalbuminuria test  03/04/2017    Diabetic retinal exam  06/04/2021    Flu vaccine (1) 09/01/2021    Diabetic foot exam  09/17/2021    Annual Wellness Visit (AWV)  09/29/2021       Past Surgical History:     Past Surgical History:   Procedure Laterality Date    ARTERIOGRAM Right 8/18/2020    RIGHT ARM AORTO FEMORAL DIGITAL SUBTRACTION ARTERIOGRAPHY performed by Shantell Castellano MD at [address] Right     TOE AMPUTATION Right     All toes on right foot, 4th toe on left foot    VASCULAR SURGERY  10/2015    Right Carotid         Medications:       Prior to Admission medications    Medication Sig Start Date End Date Taking?  Authorizing Provider   indomethacin (INDOCIN) 50 MG capsule Take 1 capsule by mouth 3 times daily as needed for Pain 10/15/21  Yes Bette Ewing MD   lisinopril (PRINIVIL;ZESTRIL) 2.5 MG tablet TAKE ONE TABLET BY MOUTH DAILY 9/17/21  Yes Bette Ewing MD   fenofibrate (TRIGLIDE) 160 MG tablet TAKE ONE TABLET BY MOUTH DAILY 9/17/21  Yes Bette Ewing MD   clopidogrel (PLAVIX) 75 MG tablet TAKE ONE TABLET BY MOUTH DAILY 8/4/21  Yes Bette Ewing MD   insulin lispro (HUMALOG) 100 UNIT/ML injection vial    Yes Historical Provider, MD   famotidine (PEPCID) 20 MG tablet Take 1 tablet by mouth 2 times daily 7/26/21  Yes Bette Ewing MD   omeprazole (PRILOSEC) 20 MG delayed release capsule Take 1 capsule by mouth Daily 7/26/21  Yes Bette Ewing MD   lovastatin (MEVACOR) 20 MG tablet TAKE ONE TABLET BY MOUTH DAILY 7/23/21  Yes Bette Ewing MD   dicyclomine (BENTYL) 20 MG tablet  5/23/21  Yes Historical Provider, MD CRUZ PEN NEEDLES 31G X 6 MM MISC  5/17/21  Yes Historical Provider, MD   ondansetron (ZOFRAN) 4 MG tablet  6/17/21  Yes Historical Provider, MD   metOLazone (ZAROXOLYN) 2.5 MG tablet Take 1 tablet by mouth daily 6/18/21  Yes Rupal Cueva MD   ferrous sulfate (IRON 325) 325 (65 Fe) MG tablet Take 1 tablet by mouth 2 times daily 6/18/21  Yes Rupal Cueva MD   furosemide (LASIX) 40 MG tablet TAKE ONE TABLET BY MOUTH DAILY 6/18/21  Yes Rupal Cueva MD   cetirizine (ZYRTEC ALLERGY) 10 MG tablet Take 1 tablet by mouth daily 6/18/21  Yes Rupal Cueva MD   docusate (COLACE, DULCOLAX) 100 MG CAPS Take 100 mg by mouth 2 times daily 6/18/21  Yes Rupal Cueva MD   glipiZIDE (GLUCOTROL XL) 10 MG extended release tablet TAKE ONE TABLET BY MOUTH DAILY 6/18/21  Yes Rupal Cueva MD   metFORMIN (GLUCOPHAGE) 1000 MG tablet Take 1 tablet by mouth 2 times daily (with meals) 6/18/21  Yes Rupal Cueva MD   sertraline (ZOLOFT) 100 MG tablet Take 1 tablet by mouth daily 6/18/21  Yes Rupal Cueva MD   zinc sulfate (ZINC-220) 220 (50 Zn) MG capsule Take 4 capsules by mouth daily 6/18/21  Yes Rupal Cueva MD   Insulin Degludec (TRESIBA FLEXTOUCH) 200 UNIT/ML SOPN Inject 45 Units into the skin daily 6/18/21  Yes Rupal Cueva MD   metoprolol tartrate (LOPRESSOR) 25 MG tablet TAKE ONE-HALF TABLET BY MOUTH TWICE A DAY 3/19/21  Yes Rupal Cueva MD   amLODIPine (NORVASC) 10 MG tablet TAKE 1 TABLET BY MOUTH EVERY DAY 3/19/21  Yes Rupal Cueva MD   glucose ([facility] GLUCOSE) 4 g chewable tablet Take 4 tablets by mouth as needed for Low blood sugar 3/19/21  Yes Rupal Cueva MD   Vitamin D, Cholecalciferol, 25 MCG (1000 UT) CAPS Take 1,000 Units by mouth daily 3/2/21  Yes Rupal Cueva MD   blood glucose monitor strips Test 3 times a day & as needed for symptoms of irregular blood glucose.  12/15/20  Yes Rupal Cueva MD   blood glucose monitor kit and supplies Test 4-5 times a day & as needed for symptoms of irregular blood glucose. Please dispense supplies that are covered under pt's insurance 5/12/20  Yes Bette Ewing MD   Lancets MISC 1 each by Does not apply route 4 times daily 5/12/20  Yes Bette Ewing MD   aspirin 81 MG tablet Take 81 mg by mouth daily. Yes Historical Provider, MD   pregabalin (LYRICA) 100 MG capsule Take 1 capsule by mouth 3 times daily for 30 days. 6/18/21 7/18/21  Bette Ewing MD        Allergies:       Patient has no known allergies. Social History:     Tobacco: reports that he has been smoking cigarettes. He has a 15.00 pack-year smoking history. He has never used smokeless tobacco.  Alcohol:      reports no history of alcohol use. Drug Use:  reports current drug use. Drug: Marijuana. Family History:     Family History   Problem Relation Age of Onset    Diabetes Father        Review of Systems:         Review of Systems   Constitutional: Positive for fatigue. Negative for activity change, appetite change, diaphoresis and unexpected weight change. HENT: Positive for hearing loss. Negative for congestion, ear discharge, ear pain and sore throat. Eyes: Negative for visual disturbance. Respiratory: Negative for cough, chest tightness, shortness of breath and wheezing. Cardiovascular: Negative for chest pain, palpitations and leg swelling. Gastrointestinal: Negative for abdominal pain, blood in stool, constipation and nausea. Endocrine: Negative for cold intolerance, heat intolerance, polydipsia and polyuria. Genitourinary: Negative for difficulty urinating and dysuria. Musculoskeletal: Positive for arthralgias, gait problem and myalgias. Negative for back pain and joint swelling. Skin: Negative for rash. Allergic/Immunologic: Negative. Neurological: Positive for tremors (hands) and numbness (hands, feet). Negative for dizziness, weakness and headaches.    Psychiatric/Behavioral: Negative for behavioral problems and dysphoric mood. The patient is not nervous/anxious. Physical Exam:     Vitals:  /78 (Site: Right Upper Arm, Position: Sitting, Cuff Size: Large Adult)   Pulse 70   Temp 98.6 °F (37 °C) (Temporal)   Resp 18   Ht 5' 11\" (1.803 m)   Wt 199 lb 3.2 oz (90.4 kg)   SpO2 97%   BMI 27.78 kg/m²       Physical Exam  Vitals reviewed. Constitutional:       General: He is not in acute distress. Appearance: Normal appearance. He is well-developed. HENT:      Head: Normocephalic and atraumatic. Right Ear: Tympanic membrane, ear canal and external ear normal.      Left Ear: Tympanic membrane, ear canal and external ear normal.      Mouth/Throat:      Mouth: Mucous membranes are moist.   Eyes:      General: No scleral icterus. Right eye: No discharge. Left eye: No discharge. Conjunctiva/sclera: Conjunctivae normal.   Neck:      Thyroid: No thyromegaly. Cardiovascular:      Rate and Rhythm: Normal rate and regular rhythm. Heart sounds: Normal heart sounds. No murmur heard. Pulmonary:      Effort: Pulmonary effort is normal.      Breath sounds: Normal breath sounds. No wheezing or rales. Abdominal:      General: Bowel sounds are normal. There is no distension. Palpations: Abdomen is soft. There is no mass. Tenderness: There is no abdominal tenderness. Musculoskeletal:         General: Normal range of motion. Right lower leg: Edema (right foot partially amputated) present. Left lower leg: Edema present. Lymphadenopathy:      Cervical: No cervical adenopathy. Skin:     General: Skin is warm and dry. Coloration: Skin is not pale. Findings: No rash. Neurological:      General: No focal deficit present. Mental Status: He is alert and oriented to person, place, and time. Mental status is at baseline. Cranial Nerves: No cranial nerve deficit.       Sensory: Sensory deficit (impaired sensation in feet, ankle areas) present. Coordination: Coordination abnormal.      Gait: Gait abnormal.   Psychiatric:         Mood and Affect: Mood normal.         Behavior: Behavior normal.               Data:     Lab Results   Component Value Date     03/02/2021    K 4.9 03/02/2021     03/02/2021    CO2 27 03/02/2021    BUN 53 03/02/2021    CREATININE 1.50 03/02/2021    GLUCOSE 328 03/02/2021    PROT 7.8 03/02/2021    LABALBU 4.1 03/02/2021    BILITOT 0.3 03/02/2021    ALKPHOS 80 03/02/2021    AST 14 03/02/2021    ALT 21 03/02/2021     Lab Results   Component Value Date    WBC 6.5 08/05/2020    RBC 4.15 08/05/2020    HGB 12.3 08/05/2020    HCT 37.5 08/05/2020    MCV 90.5 08/05/2020    MCH 29.8 08/05/2020    MCHC 32.9 08/05/2020    RDW 14.2 08/05/2020     08/05/2020    MPV NOT REPORTED 05/22/2020     No results found for: TSH  Lab Results   Component Value Date    CHOL 220 03/02/2021    HDL 44 03/02/2021    LABA1C 8.2 06/18/2021          Assessment & Plan        Diagnosis Orders   1. Diabetic peripheral neuropathy [facility])  External Referral To Pain Clinic    Walker rolling   2. Bilateral leg pain   Continue on Lyrica, prn Indocin. Refer to pain management Dr. Emily Muhammad in [facility]  indomethacin (INDOCIN) 50 MG capsule    External Referral To Pain Clinic    Richard Au rolling    Wheelchair   3. PAD (peripheral artery disease) [facility])  External Referral To Pain Clinic    Wheelchair   4. Acquired absence of right foot ([address]  Walker rolling    Wheelchair   5. Recurrent falls while walking  Wheelchair       In my opinion, Maria Victoria Sewell will benefit from  a  wheelchair to successfully complete daily living tasks such as: toileting, bathing, dressing, and grooming; or any other daily living task in the home. A  wheelchair is necessary due to the patient's impaired ambulation and mobility restrictions. The patient would not be able to resolve these daily living tasks using a cane or walker.      The patient cannot self-propel in a standard wheelchair due to developing weakness in arms and SOB. The patient has not expressed an unwillingness to use the wheelchair. Completed Refills   Requested Prescriptions     Signed Prescriptions Disp Refills    indomethacin (INDOCIN) 50 MG capsule 60 capsule 1     Sig: Take 1 capsule by mouth 3 times daily as needed for Pain     Return in about 4 weeks (around 11/12/2021) for HTN, diabetes. Orders Placed This Encounter   Medications    indomethacin (INDOCIN) 50 MG capsule     Sig: Take 1 capsule by mouth 3 times daily as needed for Pain     Dispense:  60 capsule     Refill:  1     Orders Placed This Encounter   Procedures    External Referral To Pain Clinic     Referral Priority:   Routine     Referral Type:   Eval and Treat     Referral Reason:   Specialty Services Required     Referred to Provider:   Silver Braswell MD     Requested Specialty:   Pain Management     Number of Visits Requested:   1    Walker rolling    Wheelchair         There are no Patient Instructions on file for this visit.     Electronically signed by Marquez Gonzalez MD on 10/17/2021 at 1:57 PM           Completed Refills      Requested Prescriptions     Signed Prescriptions Disp Refills    indomethacin (INDOCIN) 50 MG capsule 60 capsule 1     Sig: Take 1 capsule by mouth 3 times daily as needed for Pain

## 2021-10-17 ASSESSMENT — ENCOUNTER SYMPTOMS: BACK PAIN: 0

## 2021-11-08 DIAGNOSIS — I10 ESSENTIAL HYPERTENSION: ICD-10-CM

## 2021-11-08 DIAGNOSIS — E11.59 TYPE 2 DIABETES MELLITUS WITH OTHER CIRCULATORY COMPLICATION, UNSPECIFIED WHETHER LONG TERM INSULIN USE (HCC): ICD-10-CM

## 2021-11-08 DIAGNOSIS — M79.89 LEG SWELLING: ICD-10-CM

## 2021-11-08 RX ORDER — METOLAZONE 2.5 MG/1
TABLET ORAL
Qty: 30 TABLET | Refills: 5 | Status: SHIPPED | OUTPATIENT
Start: 2021-11-08

## 2021-11-08 RX ORDER — GLIPIZIDE 10 MG/1
TABLET, FILM COATED, EXTENDED RELEASE ORAL
Qty: 3 TABLET | Refills: 5 | Status: SHIPPED | OUTPATIENT
Start: 2021-11-08

## 2021-11-08 RX ORDER — AMLODIPINE BESYLATE 10 MG/1
TABLET ORAL
Qty: 90 TABLET | Refills: 1 | Status: SHIPPED | OUTPATIENT
Start: 2021-11-08

## 2021-11-08 RX ORDER — FUROSEMIDE 40 MG/1
TABLET ORAL
Qty: 30 TABLET | Refills: 3 | Status: SHIPPED | OUTPATIENT
Start: 2021-11-08

## 2021-11-08 NOTE — TELEPHONE ENCOUNTER
Pending medication. Health Maintenance   Topic Date Due    AAA screen  Never done    COVID-19 Vaccine (1) Never done    Shingles Vaccine (1 of 2) Never done    Diabetic microalbuminuria test  03/04/2017    Diabetic retinal exam  06/04/2021    Flu vaccine (1) 09/01/2021    Diabetic foot exam  09/17/2021    Annual Wellness Visit (AWV)  09/29/2021    Lipid screen  03/02/2022    Potassium monitoring  03/02/2022    Creatinine monitoring  03/02/2022    A1C test (Diabetic or Prediabetic)  06/18/2022    DTaP/Tdap/Td vaccine (2 - Td or Tdap) 08/19/2025    Colon cancer screen colonoscopy  11/09/2026    Pneumococcal 65+ years Vaccine  Completed    Hepatitis C screen  Completed    Hepatitis A vaccine  Aged Out    Hib vaccine  Aged Out    Meningococcal (ACWY) vaccine  Aged Out             (applicable per patient's age: Cancer Screenings, Depression Screening, Fall Risk Screening, Immunizations)    Hemoglobin A1C (%)   Date Value   06/18/2021 8.2   03/19/2021 11.4   12/15/2020 9.3     Microalb/Crt.  Ratio (mcg/mg creat)   Date Value   03/04/2016 17 (H)     LDL Cholesterol (mg/dL)   Date Value   04/04/2019 54     LDL Calculated (mg/dL)   Date Value   03/02/2021 126     AST (U/L)   Date Value   03/02/2021 14     ALT (U/L)   Date Value   03/02/2021 21     BUN (mg/dL)   Date Value   03/02/2021 53      (goal A1C is < 7)   (goal LDL is <100) need 30-50% reduction from baseline     BP Readings from Last 3 Encounters:   10/15/21 132/78   07/26/21 130/82   06/18/21 120/70    (goal /80)      All Future Testing planned in [facility]:  Lab Frequency Next Occurrence   Basic Metabolic Panel Once 40/51/7716   Magnesium Once 06/18/2022   TSH with Reflex Once 06/18/2022       Next Visit Date:  Future Appointments   Date Time Provider Shira Becerra   11/11/2021  4:00 PM Zoila Kumar MD [facility] [address] [facility]            Patient Active Problem List:     Deafness     PAD (peripheral artery disease) ([address]     Hypertension Type 2 diabetes mellitus with circulatory disorder (HCC)     Diabetic peripheral neuropathy (HCC)     Mixed hyperlipidemia     Benign prostatic hyperplasia     Bilateral carotid artery occlusion     Osteomyelitis, multiple sites (HCC)     Acquired absence of right foot (HCC)     Major depressive disorder in remission (HCC)     Chronic pain of both lower extremities     Anemia     Chronic multifocal osteomyelitis (HCC)     Postoperative pain     Protein-calorie malnutrition ([facility] [address]     Weight loss

## 2021-11-12 ENCOUNTER — OFFICE VISIT (OUTPATIENT)
Dept: FAMILY MEDICINE CLINIC | Age: 66
End: 2021-11-12
Payer: MEDICARE

## 2021-11-12 VITALS
BODY MASS INDEX: 27.86 KG/M2 | RESPIRATION RATE: 18 BRPM | SYSTOLIC BLOOD PRESSURE: 130 MMHG | DIASTOLIC BLOOD PRESSURE: 62 MMHG | OXYGEN SATURATION: 95 % | HEART RATE: 60 BPM | TEMPERATURE: 97.8 F | WEIGHT: 199 LBS | HEIGHT: 71 IN

## 2021-11-12 DIAGNOSIS — Z13.31 POSITIVE DEPRESSION SCREENING: ICD-10-CM

## 2021-11-12 DIAGNOSIS — K21.9 GASTROESOPHAGEAL REFLUX DISEASE WITHOUT ESOPHAGITIS: ICD-10-CM

## 2021-11-12 DIAGNOSIS — E11.59 TYPE 2 DIABETES MELLITUS WITH OTHER CIRCULATORY COMPLICATION, UNSPECIFIED WHETHER LONG TERM INSULIN USE (HCC): Primary | ICD-10-CM

## 2021-11-12 DIAGNOSIS — I10 PRIMARY HYPERTENSION: ICD-10-CM

## 2021-11-12 DIAGNOSIS — F32.5 MAJOR DEPRESSIVE DISORDER IN REMISSION, UNSPECIFIED WHETHER RECURRENT (HCC): ICD-10-CM

## 2021-11-12 DIAGNOSIS — Z00.00 ROUTINE GENERAL MEDICAL EXAMINATION AT A HEALTH CARE FACILITY: ICD-10-CM

## 2021-11-12 DIAGNOSIS — L97.911 ULCER OF RIGHT LOWER EXTREMITY, LIMITED TO BREAKDOWN OF SKIN (HCC): ICD-10-CM

## 2021-11-12 DIAGNOSIS — E78.2 MIXED HYPERLIPIDEMIA: ICD-10-CM

## 2021-11-12 DIAGNOSIS — L89.151 PRESSURE INJURY OF SACRAL REGION, STAGE 1: ICD-10-CM

## 2021-11-12 DIAGNOSIS — I73.9 PAD (PERIPHERAL ARTERY DISEASE) (HCC): ICD-10-CM

## 2021-11-12 DIAGNOSIS — E11.42 DIABETIC PERIPHERAL NEUROPATHY (HCC): ICD-10-CM

## 2021-11-12 PROBLEM — R63.4 WEIGHT LOSS: Status: RESOLVED | Noted: 2021-06-17 | Resolved: 2021-11-12

## 2021-11-12 PROCEDURE — 3052F HG A1C>EQUAL 8.0%<EQUAL 9.0%: CPT | Performed by: INTERNAL MEDICINE

## 2021-11-12 PROCEDURE — 4040F PNEUMOC VAC/ADMIN/RCVD: CPT | Performed by: INTERNAL MEDICINE

## 2021-11-12 PROCEDURE — G8484 FLU IMMUNIZE NO ADMIN: HCPCS | Performed by: INTERNAL MEDICINE

## 2021-11-12 PROCEDURE — 3017F COLORECTAL CA SCREEN DOC REV: CPT | Performed by: INTERNAL MEDICINE

## 2021-11-12 PROCEDURE — G8427 DOCREV CUR MEDS BY ELIG CLIN: HCPCS | Performed by: INTERNAL MEDICINE

## 2021-11-12 PROCEDURE — 2022F DILAT RTA XM EVC RTNOPTHY: CPT | Performed by: INTERNAL MEDICINE

## 2021-11-12 PROCEDURE — G8417 CALC BMI ABV UP PARAM F/U: HCPCS | Performed by: INTERNAL MEDICINE

## 2021-11-12 PROCEDURE — 99214 OFFICE O/P EST MOD 30 MIN: CPT | Performed by: INTERNAL MEDICINE

## 2021-11-12 PROCEDURE — G0439 PPPS, SUBSEQ VISIT: HCPCS | Performed by: INTERNAL MEDICINE

## 2021-11-12 PROCEDURE — 4004F PT TOBACCO SCREEN RCVD TLK: CPT | Performed by: INTERNAL MEDICINE

## 2021-11-12 PROCEDURE — G8431 POS CLIN DEPRES SCRN F/U DOC: HCPCS | Performed by: INTERNAL MEDICINE

## 2021-11-12 PROCEDURE — 1123F ACP DISCUSS/DSCN MKR DOCD: CPT | Performed by: INTERNAL MEDICINE

## 2021-11-12 RX ORDER — FAMOTIDINE 20 MG/1
20 TABLET, FILM COATED ORAL 2 TIMES DAILY
Qty: 60 TABLET | Refills: 5 | Status: SHIPPED | OUTPATIENT
Start: 2021-11-12

## 2021-11-12 RX ORDER — OMEPRAZOLE 20 MG/1
20 CAPSULE, DELAYED RELEASE ORAL DAILY
Qty: 30 CAPSULE | Refills: 5 | Status: SHIPPED | OUTPATIENT
Start: 2021-11-12

## 2021-11-12 RX ORDER — LOVASTATIN 20 MG/1
20 TABLET ORAL NIGHTLY
Qty: 30 TABLET | Refills: 5 | Status: SHIPPED | OUTPATIENT
Start: 2021-11-12

## 2021-11-12 RX ORDER — FERROUS SULFATE 325(65) MG
1 TABLET ORAL 2 TIMES DAILY
Qty: 60 TABLET | Refills: 3 | Status: SHIPPED | OUTPATIENT
Start: 2021-11-12

## 2021-11-12 RX ORDER — SERTRALINE HYDROCHLORIDE 100 MG/1
100 TABLET, FILM COATED ORAL DAILY
Qty: 30 TABLET | Refills: 3 | Status: SHIPPED | OUTPATIENT
Start: 2021-11-12

## 2021-11-12 RX ORDER — OSTOMY SUPPLY 1"
1 EACH MISCELLANEOUS
Qty: 5 G | Refills: 1 | Status: SHIPPED | OUTPATIENT
Start: 2021-11-12

## 2021-11-12 RX ORDER — CETIRIZINE HYDROCHLORIDE 10 MG/1
10 TABLET ORAL DAILY
Qty: 30 TABLET | Refills: 5 | Status: SHIPPED | OUTPATIENT
Start: 2021-11-12

## 2021-11-12 RX ORDER — INSULIN DEGLUDEC 200 U/ML
45 INJECTION, SOLUTION SUBCUTANEOUS DAILY
Qty: 1 PEN | Refills: 5 | Status: SHIPPED | OUTPATIENT
Start: 2021-11-12

## 2021-11-12 RX ORDER — DOXYCYCLINE HYCLATE 100 MG/1
100 CAPSULE ORAL 2 TIMES DAILY
Qty: 14 CAPSULE | Refills: 0 | Status: SHIPPED | OUTPATIENT
Start: 2021-11-12 | End: 2021-11-19

## 2021-11-12 RX ORDER — FAMOTIDINE 20 MG
1 TABLET ORAL DAILY
Qty: 30 CAPSULE | Refills: 5 | Status: SHIPPED | OUTPATIENT
Start: 2021-11-12

## 2021-11-12 RX ORDER — ZINC SULFATE 50(220)MG
220 CAPSULE ORAL DAILY
Qty: 30 CAPSULE | Refills: 0 | Status: SHIPPED | COMMUNITY
Start: 2021-11-12

## 2021-11-12 ASSESSMENT — ENCOUNTER SYMPTOMS
BACK PAIN: 1
SHORTNESS OF BREATH: 0
NAUSEA: 1
CONSTIPATION: 0
ABDOMINAL PAIN: 0
ALLERGIC/IMMUNOLOGIC NEGATIVE: 1
SINUS PRESSURE: 0
BLOOD IN STOOL: 0
SORE THROAT: 0
WHEEZING: 0
ORTHOPNEA: 0
HEARTBURN: 1
BLURRED VISION: 0
COUGH: 0

## 2021-11-12 ASSESSMENT — PATIENT HEALTH QUESTIONNAIRE - PHQ9
9. THOUGHTS THAT YOU WOULD BE BETTER OFF DEAD, OR OF HURTING YOURSELF: 0
SUM OF ALL RESPONSES TO PHQ QUESTIONS 1-9: 12
4. FEELING TIRED OR HAVING LITTLE ENERGY: 3
SUM OF ALL RESPONSES TO PHQ QUESTIONS 1-9: 12
1. LITTLE INTEREST OR PLEASURE IN DOING THINGS: 2
3. TROUBLE FALLING OR STAYING ASLEEP: 2
8. MOVING OR SPEAKING SO SLOWLY THAT OTHER PEOPLE COULD HAVE NOTICED. OR THE OPPOSITE, BEING SO FIGETY OR RESTLESS THAT YOU HAVE BEEN MOVING AROUND A LOT MORE THAN USUAL: 0
5. POOR APPETITE OR OVEREATING: 3
SUM OF ALL RESPONSES TO PHQ9 QUESTIONS 1 & 2: 4
2. FEELING DOWN, DEPRESSED OR HOPELESS: 2
10. IF YOU CHECKED OFF ANY PROBLEMS, HOW DIFFICULT HAVE THESE PROBLEMS MADE IT FOR YOU TO DO YOUR WORK, TAKE CARE OF THINGS AT HOME, OR GET ALONG WITH OTHER PEOPLE: 0
6. FEELING BAD ABOUT YOURSELF - OR THAT YOU ARE A FAILURE OR HAVE LET YOURSELF OR YOUR FAMILY DOWN: 0
7. TROUBLE CONCENTRATING ON THINGS, SUCH AS READING THE NEWSPAPER OR WATCHING TELEVISION: 0
SUM OF ALL RESPONSES TO PHQ QUESTIONS 1-9: 12

## 2021-11-12 ASSESSMENT — LIFESTYLE VARIABLES: HOW OFTEN DO YOU HAVE A DRINK CONTAINING ALCOHOL: 0

## 2021-11-12 ASSESSMENT — COLUMBIA-SUICIDE SEVERITY RATING SCALE - C-SSRS
6. HAVE YOU EVER DONE ANYTHING, STARTED TO DO ANYTHING, OR PREPARED TO DO ANYTHING TO END YOUR LIFE?: NO
2. HAVE YOU ACTUALLY HAD ANY THOUGHTS OF KILLING YOURSELF?: NO
1. WITHIN THE PAST MONTH, HAVE YOU WISHED YOU WERE DEAD OR WISHED YOU COULD GO TO SLEEP AND NOT WAKE UP?: NO

## 2021-11-12 NOTE — PATIENT INSTRUCTIONS
Personalized Preventive Plan for Viola Melanie - 11/12/2021  Medicare offers a range of preventive health benefits. Some of the tests and screenings are paid in full while other may be subject to a deductible, co-insurance, and/or copay. Some of these benefits include a comprehensive review of your medical history including lifestyle, illnesses that may run in your family, and various assessments and screenings as appropriate. After reviewing your medical record and screening and assessments performed today your provider may have ordered immunizations, labs, imaging, and/or referrals for you. A list of these orders (if applicable) as well as your Preventive Care list are included within your After Visit Summary for your review. Other Preventive Recommendations:    · A preventive eye exam performed by an eye specialist is recommended every 1-2 years to screen for glaucoma; cataracts, macular degeneration, and other eye disorders. · A preventive dental visit is recommended every 6 months. · Try to get at least 150 minutes of exercise per week or 10,000 steps per day on a pedometer . · Order or download the FREE \"Exercise & Physical Activity: Your Everyday Guide\" from [employer]. Call 0-288.819.1215 or search [employer] online. · You need 3519-8954 mg of calcium and 5742-5571 IU of vitamin D per day. It is possible to meet your calcium requirement with diet alone, but a vitamin D supplement is usually necessary to meet this goal.  · When exposed to the sun, use a sunscreen that protects against both UVA and UVB radiation with an SPF of 30 or greater. Reapply every 2 to 3 hours or after sweating, drying off with a towel, or swimming. · Always wear a seat belt when traveling in a car. Always wear a helmet when riding a bicycle or motorcycle.

## 2021-11-12 NOTE — PROGRESS NOTES
Medicare Annual Wellness Visit  Name: Thiago Morrissey Date: 2021   MRN: [id number] Sex: Male   Age: 74 y.o. Ethnicity: Non- / Non    : 1955 Race: White (non-)      Johan Pablo is here for Medicare AWV, Diabetes (Check up. ), Hypertension, COPD, and Other (Patient has been falling recently. Went to leg doctor and they found a clot in the top of foot. Getting them removed Monday 11/15)    Screenings for behavioral, psychosocial and functional/safety risks, and cognitive dysfunction are all negative except as indicated below. These results, as well as other patient data from the [address] [facility] [address] form, are documented in Flowsheets linked to this Encounter. No Known Allergies    Prior to Visit Medications    Medication Sig Taking?  Authorizing Provider   furosemide (LASIX) 40 MG tablet TAKE ONE TABLET BY MOUTH DAILY Yes Jude Metzger MD   metOLazone (ZAROXOLYN) 2.5 MG tablet TAKE ONE TABLET BY MOUTH DAILY Yes Jude Metzger MD   glipiZIDE (GLUCOTROL XL) 10 MG extended release tablet TAKE ONE TABLET BY MOUTH DAILY Yes Jude Metzger MD   amLODIPine (NORVASC) 10 MG tablet TAKE ONE TABLET BY MOUTH DAILY Yes Jude Metzger MD   indomethacin (INDOCIN) 50 MG capsule Take 1 capsule by mouth 3 times daily as needed for Pain Yes Jude Metzger MD   lisinopril (PRINIVIL;ZESTRIL) 2.5 MG tablet TAKE ONE TABLET BY MOUTH DAILY Yes Jude Metzger MD   fenofibrate (TRIGLIDE) 160 MG tablet TAKE ONE TABLET BY MOUTH DAILY Yes Jude Metzger MD   clopidogrel (PLAVIX) 75 MG tablet TAKE ONE TABLET BY MOUTH DAILY Yes Jude Metzger MD   insulin lispro (HUMALOG) 100 UNIT/ML injection vial  Yes Historical Provider, MD   famotidine (PEPCID) 20 MG tablet Take 1 tablet by mouth 2 times daily Yes Jude Metzger MD   omeprazole (PRILOSEC) 20 MG delayed release capsule Take 1 capsule by mouth Daily Yes Jude Metzger MD   lovastatin (MEVACOR) 20 MG tablet TAKE ONE TABLET BY Lurdes Elias MD   dicyclomine (BENTYL) 20 MG tablet  Yes Historical Provider, MD   [employer] PEN NEEDLES 31G X 6 MM [address] [facility]  Yes Historical Provider, MD   ondansetron (ZOFRAN) 4 MG tablet  Yes Historical Provider, MD   ferrous sulfate (IRON 325) 325 (65 Fe) MG tablet Take 1 tablet by mouth 2 times daily Yes Eliot Salas MD   cetirizine (ZYRTEC ALLERGY) 10 MG tablet Take 1 tablet by mouth daily Yes Eliot Salas MD   docusate (COLACE, DULCOLAX) 100 MG CAPS Take 100 mg by mouth 2 times daily Yes Eliot Salas MD   metFORMIN (GLUCOPHAGE) 1000 MG tablet Take 1 tablet by mouth 2 times daily (with meals) Yes Eliot Salas MD   sertraline (ZOLOFT) 100 MG tablet Take 1 tablet by mouth daily Yes Eliot Salas MD   zinc sulfate (ZINC-220) 220 (50 Zn) MG capsule Take 4 capsules by mouth daily Yes Eliot Salas MD   Insulin Degludec (TRESIBA FLEXTOUCH) 200 UNIT/ML SOPN Inject 45 Units into the skin daily Yes Eliot Salas MD   metoprolol tartrate (LOPRESSOR) 25 MG tablet TAKE ONE-HALF TABLET BY MOUTH TWICE A DAY Yes Eliot Salas MD   glucose ([facility] GLUCOSE) 4 g chewable tablet Take 4 tablets by mouth as needed for Low blood sugar Yes Eliot Salas MD   Vitamin D, Cholecalciferol, 25 MCG (1000 UT) CAPS Take 1,000 Units by mouth daily Yes Eliot Salas MD   blood glucose monitor strips Test 3 times a day & as needed for symptoms of irregular blood glucose. Yes Eliot Salas MD   blood glucose monitor kit and supplies Test 4-5 times a day & as needed for symptoms of irregular blood glucose. Please dispense supplies that are covered under pt's insurance Yes Eliot Salas MD   Lancets MISC 1 each by Does not apply route 4 times daily Yes Eliot Salas MD   aspirin 81 MG tablet Take 81 mg by mouth daily. Yes Historical Provider, MD   pregabalin (LYRICA) 100 MG capsule Take 1 capsule by mouth 3 times daily for 30 days.   Eliot Salas MD       Past Medical History: Diagnosis Date    Deafness 9/16/2011    [facility] 9/16/2011    Dyslipidemia 9/16/2011    Hypertension 9/16/2011    MRSA infection 03/23/2018    foot and blood    PAD (peripheral artery disease) ([facility] 9/16/2011    Sinusitis, chronic 9/16/2011    Type II or unspecified type diabetes mellitus without mention of complication, not stated as uncontrolled        Past Surgical History:   Procedure Laterality Date    ARTERIOGRAM Right 8/18/2020    RIGHT ARM AORTO FEMORAL DIGITAL SUBTRACTION ARTERIOGRAPHY performed by Ezio Rush MD at [address]     TOE AMPUTATION Right     All toes on right foot, 4th toe on left foot    VASCULAR SURGERY  10/2015    Right Carotid        Family History   Problem Relation Age of Onset    Diabetes Father        CareTeam (Including outside providers/suppliers regularly involved in providing care):   Patient Care Team:  Marquez Gonzalez MD as PCP - General (Hospitalist)  Marquez Gonzalez MD as PCP - [facility] Provider    Wt Readings from Last 3 Encounters:   11/12/21 199 lb (90.3 kg)   10/15/21 199 lb 3.2 oz (90.4 kg)   07/26/21 215 lb (97.5 kg)     Vitals:    11/12/21 1254   BP: 130/62   Site: Right Upper Arm   Position: Sitting   Cuff Size: Large Adult   Pulse: 60   Resp: 18   Temp: 97.8 °F (36.6 °C)   TempSrc: Temporal   SpO2: 95%   Weight: 199 lb (90.3 kg)   Height: 5' 11\" (1.803 m)     Body mass index is 27.75 kg/m². Based upon direct observation of the patient, evaluation of cognition reveals remote memory intact, recent memory impaired.     General Appearance: unkept, alert and oriented to person, place , well developed and well- nourished, in no acute distress  Skin: warm and dry, no rash , multiple superficial open and healed wounds present on forearms and legs  Head: normocephalic and atraumatic  Eyes: pupils equal, round, and reactive to light, extraocular eye movements intact, conjunctivae normal  ENT: tympanic membrane, external ear and ear canal normal bilaterally, nose without deformity, nasal mucosa and turbinates normal without polyps  Neck: supple and non-tender without mass, no thyromegaly or thyroid nodules, no cervical lymphadenopathy  Pulmonary/Chest: clear to auscultation bilaterally- no wheezes, rales or rhonchi, normal air movement, no respiratory distress  Cardiovascular: normal rate, regular rhythm, normal S1 and S2, no murmurs, rubs, clicks, or gallops, distal pulses difficult to palpate  Abdomen: soft, non-tender, non-distended, normal bowel sounds  Extremities: feet warm, trace edema, multiple open wounds on both lower legs , right foot partial amputation  Musculoskeletal: normal range of motion, no joint swelling, deformity or tenderness  Neurologic: reflexes normal and symmetric, no cranial nerve deficit, gait, coordination and speech normal    Patient's complete Health Risk Assessment and screening values have been reviewed and are found in Flowsheets. The following problems were reviewed today and where indicated follow up appointments were made and/or referrals ordered.     Positive Risk Factor Screenings with Interventions:     Fall Risk:  2 or more falls in past year?: (!) yes  Fall with injury in past year?: (!) yes  Fall Risk Interventions:    · Home safety tips provided  · Patient declines any further evaluation/treatment for this issue     Depression:  PHQ-2 Score: 4  PHQ-9 Total Score: 12    Severity:1-4 = minimal depression, 5-9 = mild depression, 10-14 = moderate depression, 15-19 = moderately severe depression, 20-27 = severe depression  Depression Interventions:  · Medication prescribed- resumed Zoloft     Substance History:  Social History     Tobacco History     Smoking Status  Current Every Day Smoker Smoking Frequency  1 pack/day for 15 years (15 pk yrs) Smoking Tobacco Type  Cigarettes    Smokeless Tobacco Use  Never Used          Alcohol History     Alcohol Use Status  No          Drug Use     Drug Use Status  Yes Types  Marijuana (Weed)          Sexual Activity     Sexually Active  Not Asked               Alcohol Screening:       A score of 8 or more is associated with harmful or hazardous drinking. A score of 13 or more in women, and 15 or more in men, is likely to indicate alcohol dependence. Substance Abuse Interventions:  · Tobacco abuse:  tobacco cessation tips and resources provided    General Health and ACP:  General  In general, how would you say your health is?: (!) Poor  In the past 7 days, have you experienced any of the following? New or Increased Pain, New or Increased Fatigue, Loneliness, Social Isolation, Stress or Anger?: (!) Anger, Stress, New or Increased Fatigue, New or Increased Pain, Loneliness, Social Isolation  Do you get the social and emotional support that you need?: Yes  Do you have a Living Will?: (!) No  Advance Directives     Power of  Living Will ACP-Advance Directive ACP-Power of     Not on File Not on File Not on File Not on File      General Health Risk Interventions:  · Poor self-assessment of health status: patient declines any further evaluation/treatment for this issue, patient declines further interventions, --------------------------------------------------------------------------------  · Pain issues: on Lyrica for chronic pain in legs  · Fatigue: patient declines any further evaluation/treatment for this issue  · Loneliness: patient declines any further intervention for this issue, brother and sister in law helping with his care  · Social isolation: patient declines any further intervention for this issue  · Stress: patient's comments regarding reasons for stress and/or anger: multiple chronic health problems, difficulties with ADLs contributing to stress and anger.    · Anger: relaxation techniques discussed  · No Living Will: Patient declines ACP discussion/assistance    Health Habits/Nutrition:  Health Habits/Nutrition  Do you exercise for at least 20 minutes 2-3 times per week?: Yes  Have you lost any weight without trying in the past 3 months?: No  Do you eat only one meal per day?: No  Have you seen the dentist within the past year?: (!) No  Body mass index: (!) 27.75  Health Habits/Nutrition Interventions:  · Nutritional issues:  patient is not ready to address his/her nutritional/weight issues at this time    Hearing/Vision:  No exam data present  Hearing/Vision  Do you or your family notice any trouble with your hearing that hasn't been managed with hearing aids?: (!) Yes  Do you have difficulty driving, watching TV, or doing any of your daily activities because of your eyesight?: No  Have you had an eye exam within the past year?: Yes  Hearing/Vision Interventions:  · Hearing concerns:  patient declines any further evaluation/treatment for hearing issues    Safety:  Safety  Do you have working smoke detectors?: (!) No  Have all throw rugs been removed or fastened?: Yes  Do you have non-slip mats or surfaces in all bathtubs/showers?: Yes  Do all of your stairways have a railing or banister?: Yes  Are your doorways, halls and stairs free of clutter?: Yes  Do you always fasten your seatbelt when you are in a car?: Yes  Safety Interventions:  · Home safety tips provided    ADL:  ADLs  In the past 7 days, did you need help from others to perform any of the following everyday activities? Eating, dressing, grooming, bathing, toileting, or walking/balance?: (!) Walking/Balance  In the past 7 days, did you need help from others to take care of any of the following?  Laundry, housekeeping, banking/finances, shopping, telephone use, food preparation, transportation, or taking medications?: [employer], Housekeeping, Banking/Finances, Shopping, Transportation, Taking Medications  ADL Interventions:  · Patient declines any further evaluation/treatment for this issue  · family members helping Personalized Preventive Plan   Current Health Maintenance Status  Immunization History   Administered Date(s) Administered    Influenza Vaccine, unspecified formulation 11/08/2016    Influenza Virus Vaccine 10/10/2014, 11/08/2016    Influenza Whole 10/10/2014    Influenza, Quadv, IM, PF (6 mo and older Fluzone, Flulaval, Fluarix, and 3 yrs and older Afluria) 12/15/2020    Influenza, Triv, 3 Years and older, IM (Afluria (5 yrs and older) 11/08/2016    Pneumococcal Conjugate 13-valent ([id number]) 09/21/2018    Pneumococcal Conjugate 7-valent (Prevnar7) 10/10/2014    Pneumococcal Polysaccharide ([id number]) 08/19/2015, 12/15/2020    Tdap (Boostrix, Adacel) 08/19/2015        Health Maintenance   Topic Date Due    AAA screen  Never done    Diabetic microalbuminuria test  03/04/2017    Diabetic retinal exam  06/04/2021    Annual Wellness Visit (AWV)  09/29/2021    Flu vaccine (1) 11/12/2022 (Originally 9/1/2021)    Shingles Vaccine (1 of 2) 11/12/2022 (Originally 2/22/2003)    COVID-19 Vaccine (1) 11/12/2022 (Originally 2/22/1967)    Lipid screen  03/02/2022    Potassium monitoring  03/02/2022    Creatinine monitoring  03/02/2022    A1C test (Diabetic or Prediabetic)  06/18/2022    Diabetic foot exam  11/12/2022    DTaP/Tdap/Td vaccine (2 - Td or Tdap) 08/19/2025    Colon cancer screen colonoscopy  11/09/2026    Pneumococcal 65+ years Vaccine  Completed    Hepatitis C screen  Completed    Hepatitis A vaccine  Aged Out    Hib vaccine  Aged Out    Meningococcal (ACWY) vaccine  Aged Out     Recommendations for All My Data Due: see orders and patient instructions/AVS.  . Recommended screening schedule for the next 5-10 years is provided to the patient in written form: see Patient Instructions/AVS.    Payne Jessica was seen today for medicare awv, diabetes, hypertension, copd and other.     Diagnoses and all orders for this visit:    Type 2 diabetes mellitus with other circulatory complication, unspecified whether long term insulin use (HCC)  -      DIABETES FOOT EXAM  -     POCT glycosylated hemoglobin (Hb A1C)    Primary hypertension    Mixed hyperlipidemia    Diabetic peripheral neuropathy (HCC)    PAD (peripheral artery disease) ([facility])    Gastroesophageal reflux disease without esophagitis

## 2021-11-13 NOTE — PROGRESS NOTES
HPI Notes    Name: Symone Rankin  : 1955         Chief Complaint:     Chief Complaint   Patient presents with    Medicare AWV    Diabetes     Check up.  Hypertension    COPD    Other     Patient has been falling recently. Went to leg doctor and they found a clot in the top of foot. Getting them removed Monday 11/15       History of Present Illness:        Amadou Hidlago presents to office to follow up for DM, HTN, Depression, GERD,     He is accompanied by his sister in law Monty Guzman who is very involved in his care. She is concerned about multiple open wounds on legs and pressure ulcers in the sacral area that she noticed a couple of weeks ago. States has been applying Anbx cream and covering with gauze dressing. Pt spend most of the day in his chair, does not ambulate much. He has been falling more frequently. Awaiting power wheelchair delivery from [place]. Amadou Hidalgo has a h/o severe PAD. Recently evaluated by his vascular surgeon in [place] Dr. Chacorta Mcmahan.   Had carotid U/S on 11/10/21:  Conclusions  * Right. * 1-49% stenosis of the proximal right internal carotid artery. * Right vertebral artery is patent with antegrade flow. * No evidence of stenosis in the right subclavian artery. * Left. * 1-49% stenosis of the proximal left internal carotid artery. * Left vertebral artery is patent with antegrade flow. * No evidence of stenosis in the left subclavian artery. * Tech limits. * Technically difficult exam due to shadowing from calcific plaque, patient  movement, and vessel tortuosity. Also had a  Limited bilateral noninvasive physiologic studies of upper or lower  extremity arteries with bidirectional Doppler/PVR waveform analysis at 1-2  levels. Conclusions  * Right ankle brachial index indicates mild peripheral artery disease. RYAN  is 0.80. * Patient has a right Chopart amputation.   * Left ankle brachial index is normal. RYAN is 1.17.  * Left toe brachial index is abnormal.    He is anticipating AORTOGRAM BILATERAL LOWER EXTREMITIES WITH RUNOFF POSSIBLE INTERVENTION  On 11/15    Cece presents as a follow up on his depression. Ran out of Zoloft ? Month or two ago. It tolerated well. Since starting the antidepressant the symptoms of depression have improved. Cece  is not in counciling. Cece denies suicidal ideation. Diabetes  He presents for his follow-up diabetic visit. He has type 2 diabetes mellitus. His disease course has been stable. Hypoglycemia symptoms include tremors. Pertinent negatives for hypoglycemia include no headaches, nervousness/anxiousness or seizures. Associated symptoms include fatigue, foot paresthesias and foot ulcerations. Pertinent negatives for diabetes include no blurred vision, no chest pain, no polydipsia, no polyuria and no weakness. There are no hypoglycemic complications. Diabetic complications include nephropathy, peripheral neuropathy and PVD. Pertinent negatives for diabetic complications include no heart disease. Risk factors for coronary artery disease include dyslipidemia, diabetes mellitus, male sex, hypertension, tobacco exposure and sedentary lifestyle. Current diabetic treatment includes insulin injections and oral agent (monotherapy). He is compliant with treatment most of the time. He is following a generally healthy diet. An ACE inhibitor/angiotensin II receptor blocker is being taken. Hypertension  This is a chronic problem. The current episode started more than 1 year ago. The problem is unchanged. The problem is controlled. Pertinent negatives include no blurred vision, chest pain, headaches, orthopnea, palpitations, peripheral edema or shortness of breath. Past treatments include ACE inhibitors, beta blockers and diuretics. The current treatment provides significant improvement. There are no compliance problems. Hypertensive end-organ damage includes kidney disease and PVD. Gastroesophageal Reflux  He complains of heartburn and nausea.  He reports no abdominal pain, no chest pain, no coughing, no dysphagia, no sore throat or no wheezing. This is a chronic problem. The current episode started more than 1 year ago. The problem occurs occasionally. The problem has been unchanged. The heartburn is located in the substernum. The heartburn is of mild intensity. The heartburn does not wake him from sleep. The heartburn does not limit his activity. The heartburn doesn't change with position. The symptoms are aggravated by certain foods, medications, lying down and smoking. Associated symptoms include anemia and fatigue. Pertinent negatives include no melena or orthopnea. Risk factors include lack of exercise and smoking/tobacco exposure. He has tried a PPI and a histamine-2 antagonist for the symptoms. The treatment provided significant relief.            Past Medical History:     Past Medical History:   Diagnosis Date    Deafness 9/16/2011    Diabetes mellitus ([facility] [address] 9/16/2011    Dyslipidemia 9/16/2011    Hypertension 9/16/2011    MRSA infection 03/23/2018    foot and blood    PAD (peripheral artery disease) ([facility] [address] 9/16/2011    Sinusitis, chronic 9/16/2011    Type II or unspecified type diabetes mellitus without mention of complication, not stated as uncontrolled       Reviewed all health maintenance requirements and orderedappropriate tests  Health Maintenance Due   Topic Date Due    AAA screen  Never done    Diabetic microalbuminuria test  03/04/2017    Diabetic retinal exam  06/04/2021    Annual Wellness Visit (AWV)  09/29/2021       Past Surgical History:     Past Surgical History:   Procedure Laterality Date    ARTERIOGRAM Right 8/18/2020    RIGHT ARM AORTO FEMORAL DIGITAL SUBTRACTION ARTERIOGRAPHY performed by Sarwat Granger MD at [place] [address]    INTERTROCHANTERIC HIP FRACTURE SURGERY Right     TOE AMPUTATION Right     All toes on right foot, 4th toe on left foot    VASCULAR SURGERY  10/2015    Right Carotid Medications:       Prior to Admission medications    Medication Sig Start Date End Date Taking?  Authorizing Provider   famotidine (PEPCID) 20 MG tablet Take 1 tablet by mouth 2 times daily 11/12/21  Yes Luan Gayle MD   omeprazole (PRILOSEC) 20 MG delayed release capsule Take 1 capsule by mouth Daily 11/12/21  Yes Luan Gayle MD   lovastatin (MEVACOR) 20 MG tablet Take 1 tablet by mouth nightly 11/12/21  Yes Luan Gayle MD   ferrous sulfate (IRON 325) 325 (65 Fe) MG tablet Take 1 tablet by mouth 2 times daily 11/12/21  Yes Luan Gayle MD   cetirizine (ZYRTEC ALLERGY) 10 MG tablet Take 1 tablet by mouth daily 11/12/21  Yes Luan Gayle MD   metFORMIN (GLUCOPHAGE) 1000 MG tablet Take 1 tablet by mouth 2 times daily (with meals) 11/12/21  Yes Luan Gyale MD   sertraline (ZOLOFT) 100 MG tablet Take 1 tablet by mouth daily 11/12/21  Yes Luan Gayle MD   zinc sulfate (ZINC-220) 220 (50 Zn) MG capsule Take 4 capsules by mouth daily 11/12/21  Yes Luan Gayle MD   Insulin Degludec (TRESIBA FLEXTOUCH) 200 UNIT/ML SOPN Inject 45 Units into the skin daily 11/12/21  Yes Luan Gayle MD   metoprolol tartrate (LOPRESSOR) 25 MG tablet TAKE ONE-HALF TABLET BY MOUTH TWICE A DAY 11/12/21  Yes Luan Gayle MD   glucose ([facility] GLUCOSE) 4 g chewable tablet Take 4 tablets by mouth as needed for Low blood sugar 11/12/21  Yes Luan Gayle MD   Vitamin D, Cholecalciferol, 25 MCG (1000 UT) CAPS Take 1,000 Units by mouth daily 11/12/21  Yes Luan Gayle MD   doxycycline hyclate (VIBRAMYCIN) 100 MG capsule Take 1 capsule by mouth 2 times daily for 7 days 11/12/21 11/19/21 Yes Luan Gayle MD   Control Gel Formula Dressing (DUODERM CGF DRESSING) MISC Apply 1 each topically every 72 hours 11/12/21  Yes Luan Gayle MD   furosemide (LASIX) 40 MG tablet TAKE ONE TABLET BY MOUTH DAILY 11/8/21  Yes Luan Gayle MD   metOLazone (ZAROXOLYN) 2.5 MG tablet TAKE ONE TABLET BY MOUTH DAILY 11/8/21  Yes Yulia Hicks MD   glipiZIDE (GLUCOTROL XL) 10 MG extended release tablet TAKE ONE TABLET BY MOUTH DAILY 11/8/21  Yes Yulia Hicks MD   amLODIPine (NORVASC) 10 MG tablet TAKE ONE TABLET BY MOUTH DAILY 11/8/21  Yes Yulia Hicks MD   indomethacin (INDOCIN) 50 MG capsule Take 1 capsule by mouth 3 times daily as needed for Pain 10/15/21  Yes Yulia Hicks MD   lisinopril (PRINIVIL;ZESTRIL) 2.5 MG tablet TAKE ONE TABLET BY MOUTH DAILY 9/17/21  Yes Yulia Hicks MD   fenofibrate (TRIGLIDE) 160 MG tablet TAKE ONE TABLET BY MOUTH DAILY 9/17/21  Yes Yulia Hicks MD   clopidogrel (PLAVIX) 75 MG tablet TAKE ONE TABLET BY MOUTH DAILY 8/4/21  Yes Yulia Hicks MD   insulin lispro (HUMALOG) 100 UNIT/ML injection vial    Yes Historical Provider, MD   dicyclomine (BENTYL) 20 MG tablet  5/23/21  Yes Historical Provider, MD   [employer] PEN NEEDLES 31G X 6 MM [id number]  5/17/21  Yes Historical Provider, MD   ondansetron (ZOFRAN) 4 MG tablet  6/17/21  Yes Historical Provider, MD   docusate (COLACE, DULCOLAX) 100 MG CAPS Take 100 mg by mouth 2 times daily 6/18/21  Yes Yulia Hicks MD   blood glucose monitor strips Test 3 times a day & as needed for symptoms of irregular blood glucose. 12/15/20  Yes Yulia Hicks MD   blood glucose monitor kit and supplies Test 4-5 times a day & as needed for symptoms of irregular blood glucose. Please dispense supplies that are covered under pt's insurance 5/12/20  Yes Yulia Hicks MD   Lancets MISC 1 each by Does not apply route 4 times daily 5/12/20  Yes Yulia Hicks MD   aspirin 81 MG tablet Take 81 mg by mouth daily. Yes Historical Provider, MD   pregabalin (LYRICA) 100 MG capsule Take 1 capsule by mouth 3 times daily for 30 days. 6/18/21 7/18/21  Yulia Hicks MD        Allergies:       Patient has no known allergies. Social History:     Tobacco: reports that he has been smoking cigarettes. He has a 15.00 pack-year smoking history.  He has never used smokeless tobacco.  Alcohol:      reports no history of alcohol use. Drug Use:  reports current drug use. Drug: Marijuana Ileana Bo). Family History:     Family History   Problem Relation Age of Onset    Diabetes Father        Review of Systems:         Review of Systems   Constitutional: Positive for fatigue. Negative for activity change, appetite change, fever and unexpected weight change. HENT: Positive for hearing loss. Negative for congestion, ear discharge, ear pain, sinus pressure and sore throat. Eyes: Negative for blurred vision and visual disturbance. Respiratory: Negative for cough, shortness of breath and wheezing. Cardiovascular: Negative for chest pain, palpitations, orthopnea and leg swelling. Gastrointestinal: Positive for heartburn and nausea. Negative for abdominal pain, blood in stool, constipation, dysphagia and melena. Endocrine: Negative for cold intolerance, heat intolerance, polydipsia and polyuria. Genitourinary: Negative for difficulty urinating and dysuria. Musculoskeletal: Positive for arthralgias, back pain and myalgias. Skin: Positive for wound. Negative for rash. Allergic/Immunologic: Negative. Neurological: Positive for tremors. Negative for seizures, weakness, numbness and headaches. Hematological: Negative for adenopathy. Psychiatric/Behavioral: Positive for dysphoric mood. Negative for behavioral problems and sleep disturbance. The patient is not nervous/anxious. Physical Exam:     Vitals:  /62 (Site: Right Upper Arm, Position: Sitting, Cuff Size: Large Adult)   Pulse 60   Temp 97.8 °F (36.6 °C) (Temporal)   Resp 18   Ht 5' 11\" (1.803 m)   Wt 199 lb (90.3 kg)   SpO2 95%   BMI 27.75 kg/m²       Physical Exam  Vitals reviewed. Constitutional:       General: He is not in acute distress. Appearance: He is well-developed. He is not ill-appearing or diaphoretic. HENT:      Head: Normocephalic and atraumatic. Right Ear: There is impacted cerumen. Left Ear: There is impacted cerumen. Nose: No congestion or rhinorrhea. Mouth/Throat:      Mouth: Mucous membranes are moist.      Pharynx: Oropharynx is clear. No oropharyngeal exudate or posterior oropharyngeal erythema. Eyes:      General: No scleral icterus. Right eye: No discharge. Left eye: No discharge. Pupils: Pupils are equal, round, and reactive to light. Neck:      Thyroid: No thyromegaly. Cardiovascular:      Rate and Rhythm: Normal rate and regular rhythm. Heart sounds: Normal heart sounds. No murmur heard. Pulmonary:      Effort: Pulmonary effort is normal.      Breath sounds: Normal breath sounds. No wheezing or rales. Abdominal:      General: Bowel sounds are normal. There is no distension. Palpations: Abdomen is soft. There is no mass. Tenderness: There is no abdominal tenderness. Musculoskeletal:         General: Normal range of motion. Right lower leg: Edema present. Left lower leg: Edema present. Lymphadenopathy:      Cervical: No cervical adenopathy. Skin:     General: Skin is warm and dry. Coloration: Skin is not jaundiced or pale. Findings: Lesion (multiple superficial wounds on both legs and forearms. stage 1 pressure ulcers on the coxyx) present. No rash. Neurological:      General: No focal deficit present. Mental Status: He is alert. Mental status is at baseline.       Coordination: Coordination abnormal.      Gait: Gait abnormal.   Psychiatric:         Mood and Affect: Mood normal.         Behavior: Behavior normal.               Data:     Lab Results   Component Value Date     03/02/2021    K 4.9 03/02/2021     03/02/2021    CO2 27 03/02/2021    BUN 53 03/02/2021    CREATININE 1.50 03/02/2021    GLUCOSE 328 03/02/2021    PROT 7.8 03/02/2021    LABALBU 4.1 03/02/2021    BILITOT 0.3 03/02/2021    ALKPHOS 80 03/02/2021    AST 14 03/02/2021    ALT 21 03/02/2021     Lab Results   Component Value Date    WBC 6.5 08/05/2020    RBC 4.15 08/05/2020    HGB 12.3 08/05/2020    HCT 37.5 08/05/2020    MCV 90.5 08/05/2020    MCH 29.8 08/05/2020    MCHC 32.9 08/05/2020    RDW 14.2 08/05/2020     08/05/2020    MPV NOT REPORTED 05/22/2020     No results found for: TSH  Lab Results   Component Value Date    CHOL 220 03/02/2021    HDL 44 03/02/2021    LABA1C 8.2 06/18/2021          Assessment & Plan        Diagnosis Orders   1. Type 2 diabetes mellitus with other circulatory complication, unspecified whether long term insulin use (HCC)   Uncontrolled, HbA1C 8.5% on 11/11/21. On Tresiba 40 u/day, will increse to 45 units, continue on metformin, Glipizide. Encouraged low carb diet. Refill meds, follow up in 3 months   DIABETES FOOT EXAM    metFORMIN (GLUCOPHAGE) 1000 MG tablet    Insulin Degludec (TRESIBA FLEXTOUCH) 200 UNIT/ML SOPN    glucose ([facility] GLUCOSE) 4 g chewable tablet   2. Primary hypertension   BP stable, continue on current meds    3. Mixed hyperlipidemia   Continue on current meds lovastatin (MEVACOR) 20 MG tablet   4. Diabetic peripheral neuropathy (HCC)   Continue on Lyrica    5. PAD (peripheral artery disease) ([facility]   Follows up with Dr. Patel Potts, vascular surgeon. Awaiting angiogram with possible interventions. 6. Gastroesophageal reflux disease without esophagitis   Symptoms controlled, on Pepcid and Omeprazole. famotidine (PEPCID) 20 MG tablet    omeprazole (PRILOSEC) 20 MG delayed release capsule   7. Major depressive disorder in remission, unspecified whether recurrent ([facility] [address]   Resume Zoloft. Declined referral to counseling sertraline (ZOLOFT) 100 MG tablet   8. Ulcer of right lower extremity, limited to breakdown of skin (HCC)   Prescribed Doxycycline for superficial infected wounds on legs. zinc sulfate (ZINC-220) 220 (50 Zn) MG capsule    doxycycline hyclate (VIBRAMYCIN) 100 MG capsule   9.  Pressure injury of sacral region, stage 1   Advised frequent repositioning of the body, using cushions in chair and wheelchair. Ordered Duoderm dressing. Advised referral to wound clinic but the pt and his sister in law declined at this time. Follow up any time if wounds are worse Control Gel Formula Dressing (DUODERM CGF DRESSING) [facility]   10. Routine general medical examination at a health care facility     11.  Positive depression screening  Positive Screen for Clinical Depression with a Documented Follow-up Plan                    Completed Refills   Requested Prescriptions     Signed Prescriptions Disp Refills    famotidine (PEPCID) 20 MG tablet 60 tablet 5     Sig: Take 1 tablet by mouth 2 times daily    omeprazole (PRILOSEC) 20 MG delayed release capsule 30 capsule 5     Sig: Take 1 capsule by mouth Daily    lovastatin (MEVACOR) 20 MG tablet 30 tablet 5     Sig: Take 1 tablet by mouth nightly    ferrous sulfate (IRON 325) 325 (65 Fe) MG tablet 60 tablet 3     Sig: Take 1 tablet by mouth 2 times daily    cetirizine (ZYRTEC ALLERGY) 10 MG tablet 30 tablet 5     Sig: Take 1 tablet by mouth daily    metFORMIN (GLUCOPHAGE) 1000 MG tablet 60 tablet 5     Sig: Take 1 tablet by mouth 2 times daily (with meals)    sertraline (ZOLOFT) 100 MG tablet 30 tablet 3     Sig: Take 1 tablet by mouth daily    zinc sulfate (ZINC-220) 220 (50 Zn) MG capsule 30 capsule 0     Sig: Take 4 capsules by mouth daily    Insulin Degludec (TRESIBA FLEXTOUCH) 200 UNIT/ML SOPN 1 pen 5     Sig: Inject 45 Units into the skin daily    metoprolol tartrate (LOPRESSOR) 25 MG tablet 90 tablet 5     Sig: TAKE ONE-HALF TABLET BY MOUTH TWICE A DAY    glucose ([facility] GLUCOSE) 4 g chewable tablet 60 tablet 3     Sig: Take 4 tablets by mouth as needed for Low blood sugar    Vitamin D, Cholecalciferol, 25 MCG (1000 UT) CAPS 30 capsule 5     Sig: Take 1,000 Units by mouth daily    doxycycline hyclate (VIBRAMYCIN) 100 MG capsule 14 capsule 0     Sig: Take 1 capsule by mouth 2 times daily for 7 days    Control Gel Formula Dressing (DUODERM CGF DRESSING) MISC 5 g 1     Sig: Apply 1 each topically every 72 hours     Return in about 3 months (around 2/12/2022) for diabetes, depression, gerd, HTN.      Orders Placed This Encounter   Medications    famotidine (PEPCID) 20 MG tablet     Sig: Take 1 tablet by mouth 2 times daily     Dispense:  60 tablet     Refill:  5    omeprazole (PRILOSEC) 20 MG delayed release capsule     Sig: Take 1 capsule by mouth Daily     Dispense:  30 capsule     Refill:  5    lovastatin (MEVACOR) 20 MG tablet     Sig: Take 1 tablet by mouth nightly     Dispense:  30 tablet     Refill:  5    ferrous sulfate (IRON 325) 325 (65 Fe) MG tablet     Sig: Take 1 tablet by mouth 2 times daily     Dispense:  60 tablet     Refill:  3    cetirizine (ZYRTEC ALLERGY) 10 MG tablet     Sig: Take 1 tablet by mouth daily     Dispense:  30 tablet     Refill:  5    metFORMIN (GLUCOPHAGE) 1000 MG tablet     Sig: Take 1 tablet by mouth 2 times daily (with meals)     Dispense:  60 tablet     Refill:  5    sertraline (ZOLOFT) 100 MG tablet     Sig: Take 1 tablet by mouth daily     Dispense:  30 tablet     Refill:  3    zinc sulfate (ZINC-220) 220 (50 Zn) MG capsule     Sig: Take 4 capsules by mouth daily     Dispense:  30 capsule     Refill:  0    Insulin Degludec (TRESIBA FLEXTOUCH) 200 UNIT/ML SOPN     Sig: Inject 45 Units into the skin daily     Dispense:  1 pen     Refill:  5    metoprolol tartrate (LOPRESSOR) 25 MG tablet     Sig: TAKE ONE-HALF TABLET BY MOUTH TWICE A DAY     Dispense:  90 tablet     Refill:  5    glucose ([facility] GLUCOSE) 4 g chewable tablet     Sig: Take 4 tablets by mouth as needed for Low blood sugar     Dispense:  60 tablet     Refill:  3    Vitamin D, Cholecalciferol, 25 MCG (1000 UT) CAPS     Sig: Take 1,000 Units by mouth daily     Dispense:  30 capsule     Refill:  5    doxycycline hyclate (VIBRAMYCIN) 100 MG capsule     Sig: Take 1 capsule by mouth 2 times daily for 7 days     Dispense:  14 capsule     Refill:  0    Control Gel Formula Dressing (DUODERM CGF DRESSING) MISC     Sig: Apply 1 each topically every 72 hours     Dispense:  5 g     Refill:  1     Orders Placed This Encounter   Procedures    HM DIABETES FOOT EXAM    Positive Screen for Clinical Depression with a Documented Follow-up Plan          Patient Instructions     Personalized Preventive Plan for Donell Sam - 11/12/2021  Medicare offers a range of preventive health benefits. Some of the tests and screenings are paid in full while other may be subject to a deductible, co-insurance, and/or copay. Some of these benefits include a comprehensive review of your medical history including lifestyle, illnesses that may run in your family, and various assessments and screenings as appropriate. After reviewing your medical record and screening and assessments performed today your provider may have ordered immunizations, labs, imaging, and/or referrals for you. A list of these orders (if applicable) as well as your Preventive Care list are included within your After Visit Summary for your review. Other Preventive Recommendations:    · A preventive eye exam performed by an eye specialist is recommended every 1-2 years to screen for glaucoma; cataracts, macular degeneration, and other eye disorders. · A preventive dental visit is recommended every 6 months. · Try to get at least 150 minutes of exercise per week or 10,000 steps per day on a pedometer . · Order or download the FREE \"Exercise & Physical Activity: Your Everyday Guide\" from [employer]. Call 2-726.933.2808 or search [employer] online. · You need 2439-5828 mg of calcium and 4024-5535 IU of vitamin D per day.  It is possible to meet your calcium requirement with diet alone, but a vitamin D supplement is usually necessary to meet this goal.  · When exposed to the sun, use a sunscreen that protects against both UVA and UVB radiation with an SPF of 30 or greater. Reapply every 2 to 3 hours or after sweating, drying off with a towel, or swimming. · Always wear a seat belt when traveling in a car. Always wear a helmet when riding a bicycle or motorcycle.       Electronically signed by Griselda Ahle, MD on 11/12/2021 at 10:43 PM           Completed Refills      Requested Prescriptions     Signed Prescriptions Disp Refills    famotidine (PEPCID) 20 MG tablet 60 tablet 5     Sig: Take 1 tablet by mouth 2 times daily    omeprazole (PRILOSEC) 20 MG delayed release capsule 30 capsule 5     Sig: Take 1 capsule by mouth Daily    lovastatin (MEVACOR) 20 MG tablet 30 tablet 5     Sig: Take 1 tablet by mouth nightly    ferrous sulfate (IRON 325) 325 (65 Fe) MG tablet 60 tablet 3     Sig: Take 1 tablet by mouth 2 times daily    cetirizine (ZYRTEC ALLERGY) 10 MG tablet 30 tablet 5     Sig: Take 1 tablet by mouth daily    metFORMIN (GLUCOPHAGE) 1000 MG tablet 60 tablet 5     Sig: Take 1 tablet by mouth 2 times daily (with meals)    sertraline (ZOLOFT) 100 MG tablet 30 tablet 3     Sig: Take 1 tablet by mouth daily    zinc sulfate (ZINC-220) 220 (50 Zn) MG capsule 30 capsule 0     Sig: Take 4 capsules by mouth daily    Insulin Degludec (TRESIBA FLEXTOUCH) 200 UNIT/ML SOPN 1 pen 5     Sig: Inject 45 Units into the skin daily    metoprolol tartrate (LOPRESSOR) 25 MG tablet 90 tablet 5     Sig: TAKE ONE-HALF TABLET BY MOUTH TWICE A DAY    glucose ([facility] GLUCOSE) 4 g chewable tablet 60 tablet 3     Sig: Take 4 tablets by mouth as needed for Low blood sugar    Vitamin D, Cholecalciferol, 25 MCG (1000 UT) CAPS 30 capsule 5     Sig: Take 1,000 Units by mouth daily    doxycycline hyclate (VIBRAMYCIN) 100 MG capsule 14 capsule 0     Sig: Take 1 capsule by mouth 2 times daily for 7 days    Control Gel Formula Dressing (DUODERM CGF DRESSING) MISC 5 g 1     Sig: Apply 1 each topically every 72 hours

## (undated) DEVICE — Z DISCONTINUED PER MEDLINE DRESSING ALG W9XL10CM SIL CVR WTRPRF VIR BACT BARR ANTIMIC

## (undated) DEVICE — GLOVE SURG SZ 85 L12IN FNGR THK94MIL TRNSLUC YEL LTX

## (undated) DEVICE — TOWEL,OR,DSP,ST,BLUE,STD,4/PK,20PK/CS: Brand: MEDLINE

## (undated) DEVICE — SHEATH INTRO 5FR L11CM HEMSTAT VLV DIL SIDE PRT RADPQ W/O

## (undated) DEVICE — LABEL MED MINI W/ MARKER

## (undated) DEVICE — CATHETER ANGIO RACKET COR W/O HYDRPHLC COAT AD RADPQ TIP

## (undated) DEVICE — RADIFOCUS GLIDEWIRE: Brand: GLIDEWIRE

## (undated) DEVICE — SYRINGE MED 30ML STD CLR PLAS LUERLOCK TIP N CTRL DISP

## (undated) DEVICE — NEEDLE HYPO 25GA L1.5IN BLU POLYPR HUB S STL REG BVL STR

## (undated) DEVICE — INTENDED FOR TISSUE SEPARATION, AND OTHER PROCEDURES THAT REQUIRE A SHARP SURGICAL BLADE TO PUNCTURE OR CUT.: Brand: BARD-PARKER ®  SAFETY SCALPED

## (undated) DEVICE — ZINACTIVE USE 2539609 APPLICATOR MEDICATED 10.5 CC SOLUTION HI LT ORNG CHLORAPREP

## (undated) DEVICE — SPONGE,LAP,18"X18",DLX,XR,ST,5/PK,40/PK: Brand: MEDLINE

## (undated) DEVICE — GAUZE,SPONGE,4"X4",16PLY,XRAY,STRL,LF: Brand: MEDLINE

## (undated) DEVICE — DRAPE SURG W41XL74IN CLR FULL SZ C ARM 3 ADH POLY STRP E

## (undated) DEVICE — MICROPUNCTURE INTRODUCER SET SILHOUETTE TRANSITIONLESS WITH NITINOL WIRE GUIDE: Brand: MICROPUNCTURE

## (undated) DEVICE — PACK,LAPAROTOMY,NO GOWNS: Brand: MEDLINE

## (undated) DEVICE — GLOW 'N TELL 30CM TAPE (50 STRIPS): Brand: VASCUTAPE RADIOPAQUE TAPE

## (undated) DEVICE — DECANTER BAG 9": Brand: MEDLINE INDUSTRIES, INC.

## (undated) DEVICE — Device: Brand: MEDEX

## (undated) DEVICE — CATHETER ANGIO AD PED 5FR L100CM GWIRE 0.038IN PERIPH STR

## (undated) DEVICE — NEEDLE ANGIO COURNAND THN 3 PART 18GAX5.1CM MAJESTIK

## (undated) DEVICE — SYRINGE MED 10ML LUERLOCK TIP W/O SFTY DISP